# Patient Record
Sex: MALE | Race: WHITE | NOT HISPANIC OR LATINO
[De-identification: names, ages, dates, MRNs, and addresses within clinical notes are randomized per-mention and may not be internally consistent; named-entity substitution may affect disease eponyms.]

---

## 2017-01-30 ENCOUNTER — APPOINTMENT (OUTPATIENT)
Dept: PULMONOLOGY | Facility: CLINIC | Age: 82
End: 2017-01-30

## 2017-02-27 ENCOUNTER — NON-APPOINTMENT (OUTPATIENT)
Age: 82
End: 2017-02-27

## 2017-02-27 ENCOUNTER — LABORATORY RESULT (OUTPATIENT)
Age: 82
End: 2017-02-27

## 2017-02-27 ENCOUNTER — APPOINTMENT (OUTPATIENT)
Dept: CARDIOLOGY | Facility: CLINIC | Age: 82
End: 2017-02-27

## 2017-02-27 VITALS
SYSTOLIC BLOOD PRESSURE: 138 MMHG | HEART RATE: 75 BPM | DIASTOLIC BLOOD PRESSURE: 66 MMHG | BODY MASS INDEX: 20.52 KG/M2 | OXYGEN SATURATION: 99 % | WEIGHT: 131 LBS

## 2017-02-28 LAB
ALBUMIN SERPL ELPH-MCNC: 3.4 G/DL
ALP BLD-CCNC: 79 U/L
ALT SERPL-CCNC: 10 U/L
ANION GAP SERPL CALC-SCNC: 14 MMOL/L
AST SERPL-CCNC: 15 U/L
BASOPHILS # BLD AUTO: 0.05 K/UL
BASOPHILS NFR BLD AUTO: 0.9 %
BILIRUB SERPL-MCNC: 0.4 MG/DL
BUN SERPL-MCNC: 13 MG/DL
CALCIUM SERPL-MCNC: 8.7 MG/DL
CHLORIDE SERPL-SCNC: 106 MMOL/L
CHOLEST SERPL-MCNC: 131 MG/DL
CHOLEST/HDLC SERPL: 3.1 RATIO
CO2 SERPL-SCNC: 21 MMOL/L
CREAT SERPL-MCNC: 1.07 MG/DL
EOSINOPHIL # BLD AUTO: 0.18 K/UL
EOSINOPHIL NFR BLD AUTO: 3.1 %
GLUCOSE SERPL-MCNC: 87 MG/DL
HCT VFR BLD CALC: 34.4 %
HDLC SERPL-MCNC: 42 MG/DL
HGB BLD-MCNC: 10.6 G/DL
IMM GRANULOCYTES NFR BLD AUTO: 0.5 %
LDLC SERPL CALC-MCNC: 60 MG/DL
LYMPHOCYTES # BLD AUTO: 1.16 K/UL
LYMPHOCYTES NFR BLD AUTO: 19.9 %
MAN DIFF?: NORMAL
MCHC RBC-ENTMCNC: 30.5 PG
MCHC RBC-ENTMCNC: 30.8 GM/DL
MCV RBC AUTO: 98.9 FL
MONOCYTES # BLD AUTO: 0.62 K/UL
MONOCYTES NFR BLD AUTO: 10.6 %
NEUTROPHILS # BLD AUTO: 3.8 K/UL
NEUTROPHILS NFR BLD AUTO: 65 %
PLATELET # BLD AUTO: 224 K/UL
POTASSIUM SERPL-SCNC: 4.8 MMOL/L
PROT SERPL-MCNC: 5.8 G/DL
PSA FREE FLD-MCNC: 21.4 %
PSA FREE SERPL-MCNC: 0.35 NG/ML
PSA SERPL-MCNC: 1.63 NG/ML
RBC # BLD: 3.48 M/UL
RBC # FLD: 15.9 %
SODIUM SERPL-SCNC: 141 MMOL/L
TRIGL SERPL-MCNC: 144 MG/DL
TSH SERPL-ACNC: 2.79 UIU/ML
WBC # FLD AUTO: 5.84 K/UL

## 2017-03-05 ENCOUNTER — OUTPATIENT (OUTPATIENT)
Dept: OUTPATIENT SERVICES | Facility: HOSPITAL | Age: 82
LOS: 1 days | End: 2017-03-05
Payer: MEDICARE

## 2017-03-05 ENCOUNTER — APPOINTMENT (OUTPATIENT)
Dept: MRI IMAGING | Facility: CLINIC | Age: 82
End: 2017-03-05

## 2017-03-05 DIAGNOSIS — Z95.1 PRESENCE OF AORTOCORONARY BYPASS GRAFT: Chronic | ICD-10-CM

## 2017-03-05 DIAGNOSIS — Z00.8 ENCOUNTER FOR OTHER GENERAL EXAMINATION: ICD-10-CM

## 2017-03-05 PROCEDURE — 72148 MRI LUMBAR SPINE W/O DYE: CPT

## 2017-03-06 ENCOUNTER — APPOINTMENT (OUTPATIENT)
Dept: INTERNAL MEDICINE | Facility: CLINIC | Age: 82
End: 2017-03-06

## 2017-03-06 VITALS
WEIGHT: 133 LBS | SYSTOLIC BLOOD PRESSURE: 126 MMHG | HEIGHT: 65.25 IN | HEART RATE: 86 BPM | DIASTOLIC BLOOD PRESSURE: 67 MMHG | BODY MASS INDEX: 21.89 KG/M2

## 2017-03-06 DIAGNOSIS — E53.8 DEFICIENCY OF OTHER SPECIFIED B GROUP VITAMINS: ICD-10-CM

## 2017-03-06 DIAGNOSIS — A31.9 MYCOBACTERIAL INFECTION, UNSPECIFIED: ICD-10-CM

## 2017-03-06 RX ORDER — SULFAMETHOXAZOLE AND TRIMETHOPRIM 800; 160 MG/1; MG/1
800-160 TABLET ORAL
Qty: 28 | Refills: 0 | Status: DISCONTINUED | COMMUNITY
Start: 2017-01-19 | End: 2017-03-06

## 2017-03-09 ENCOUNTER — RESULT CHARGE (OUTPATIENT)
Age: 82
End: 2017-03-09

## 2017-03-09 LAB
25(OH)D3 SERPL-MCNC: 19.3 NG/ML
ALBUMIN SERPL ELPH-MCNC: 3.4 G/DL
ALP BLD-CCNC: 72 U/L
ALT SERPL-CCNC: <4 U/L
ANION GAP SERPL CALC-SCNC: 15 MMOL/L
AST SERPL-CCNC: 15 U/L
BASOPHILS # BLD AUTO: 0.05 K/UL
BASOPHILS NFR BLD AUTO: 0.9 %
BILIRUB SERPL-MCNC: 0.2 MG/DL
BUN SERPL-MCNC: 15 MG/DL
CALCIUM SERPL-MCNC: 8.8 MG/DL
CHLORIDE SERPL-SCNC: 108 MMOL/L
CHOLEST SERPL-MCNC: 129 MG/DL
CHOLEST/HDLC SERPL: 3.8 RATIO
CO2 SERPL-SCNC: 19 MMOL/L
CREAT SERPL-MCNC: 1.12 MG/DL
DEPRECATED KAPPA LC FREE/LAMBDA SER: 1.05 RATIO
EOSINOPHIL # BLD AUTO: 0.22 K/UL
EOSINOPHIL NFR BLD AUTO: 4 %
ERYTHROCYTE [SEDIMENTATION RATE] IN BLOOD BY WESTERGREN METHOD: 26 MM/HR
FERRITIN SERPL-MCNC: 63.1 NG/ML
GLUCOSE SERPL-MCNC: 73 MG/DL
HBA1C MFR BLD HPLC: 5 %
HCT VFR BLD CALC: 34.2 %
HCYS SERPL-MCNC: 11.6 UMOL/L
HDLC SERPL-MCNC: 34 MG/DL
HGB BLD-MCNC: 11 G/DL
IF BLOCK AB SER QL: NORMAL
IMM GRANULOCYTES NFR BLD AUTO: 0.7 %
IRON SATN MFR SERPL: 15 %
IRON SERPL-MCNC: 37 UG/DL
KAPPA LC CSF-MCNC: 2.67 MG/DL
KAPPA LC SERPL-MCNC: 2.8 MG/DL
LDLC SERPL CALC-MCNC: 43 MG/DL
LYMPHOCYTES # BLD AUTO: 1.14 K/UL
LYMPHOCYTES NFR BLD AUTO: 20.8 %
M PROTEIN SPEC IFE-MCNC: NORMAL
MAN DIFF?: NORMAL
MCHC RBC-ENTMCNC: 31.6 PG
MCHC RBC-ENTMCNC: 32.2 GM/DL
MCV RBC AUTO: 98.3 FL
METHYLMALONATE SERPL-SCNC: 625 NMOL/L
MONOCYTES # BLD AUTO: 0.53 K/UL
MONOCYTES NFR BLD AUTO: 9.7 %
NEUTROPHILS # BLD AUTO: 3.5 K/UL
NEUTROPHILS NFR BLD AUTO: 63.9 %
PLATELET # BLD AUTO: 214 K/UL
POTASSIUM SERPL-SCNC: 4.1 MMOL/L
PROT SERPL-MCNC: 6 G/DL
RBC # BLD: 3.48 M/UL
RBC # FLD: 16.2 %
SODIUM SERPL-SCNC: 142 MMOL/L
TIBC SERPL-MCNC: 249 UG/DL
TRIGL SERPL-MCNC: 262 MG/DL
TSH SERPL-ACNC: 2.31 UIU/ML
UIBC SERPL-MCNC: 212 UG/DL
WBC # FLD AUTO: 5.48 K/UL

## 2017-03-20 ENCOUNTER — APPOINTMENT (OUTPATIENT)
Dept: ORTHOPEDIC SURGERY | Facility: CLINIC | Age: 82
End: 2017-03-20

## 2017-03-20 VITALS
HEIGHT: 65.25 IN | SYSTOLIC BLOOD PRESSURE: 114 MMHG | WEIGHT: 133 LBS | BODY MASS INDEX: 21.89 KG/M2 | HEART RATE: 80 BPM | DIASTOLIC BLOOD PRESSURE: 60 MMHG

## 2017-04-03 ENCOUNTER — APPOINTMENT (OUTPATIENT)
Dept: INTERNAL MEDICINE | Facility: CLINIC | Age: 82
End: 2017-04-03

## 2017-04-03 ENCOUNTER — LABORATORY RESULT (OUTPATIENT)
Age: 82
End: 2017-04-03

## 2017-04-03 VITALS
WEIGHT: 133 LBS | SYSTOLIC BLOOD PRESSURE: 125 MMHG | BODY MASS INDEX: 21.38 KG/M2 | HEART RATE: 68 BPM | TEMPERATURE: 97.5 F | DIASTOLIC BLOOD PRESSURE: 70 MMHG | HEIGHT: 66 IN

## 2017-04-05 LAB
BASOPHILS # BLD AUTO: 0.07 K/UL
BASOPHILS NFR BLD AUTO: 1.1 %
EOSINOPHIL # BLD AUTO: 0.22 K/UL
EOSINOPHIL NFR BLD AUTO: 3.6 %
FOLATE SERPL-MCNC: >20 NG/ML
HCT VFR BLD CALC: 35.7 %
HGB BLD-MCNC: 10.8 G/DL
IMM GRANULOCYTES NFR BLD AUTO: 0.6 %
LYMPHOCYTES # BLD AUTO: 1.31 K/UL
LYMPHOCYTES NFR BLD AUTO: 21.2 %
MAN DIFF?: NORMAL
MCHC RBC-ENTMCNC: 30.3 GM/DL
MCHC RBC-ENTMCNC: 31.4 PG
MCV RBC AUTO: 103.8 FL
MONOCYTES # BLD AUTO: 0.57 K/UL
MONOCYTES NFR BLD AUTO: 9.2 %
NEUTROPHILS # BLD AUTO: 3.98 K/UL
NEUTROPHILS NFR BLD AUTO: 64.3 %
PLATELET # BLD AUTO: 234 K/UL
RBC # BLD: 3.44 M/UL
RBC # BLD: 3.44 M/UL
RBC # FLD: 16.7 %
RETICS # AUTO: 2.8 %
RETICS AGGREG/RBC NFR: 96.7 K/UL
VIT B12 SERPL-MCNC: 512 PG/ML
WBC # FLD AUTO: 6.19 K/UL

## 2017-04-24 LAB — METHYLMALONATE SERPL-SCNC: 380 NMOL/L

## 2017-05-31 ENCOUNTER — APPOINTMENT (OUTPATIENT)
Dept: INTERNAL MEDICINE | Facility: CLINIC | Age: 82
End: 2017-05-31

## 2017-05-31 VITALS
TEMPERATURE: 97.9 F | SYSTOLIC BLOOD PRESSURE: 146 MMHG | DIASTOLIC BLOOD PRESSURE: 75 MMHG | WEIGHT: 140 LBS | BODY MASS INDEX: 22.5 KG/M2 | HEIGHT: 66 IN | HEART RATE: 63 BPM

## 2017-05-31 DIAGNOSIS — N40.0 BENIGN PROSTATIC HYPERPLASIA WITHOUT LOWER URINARY TRACT SYMPMS: ICD-10-CM

## 2017-05-31 RX ORDER — DUTASTERIDE 0.5 MG/1
CAPSULE, LIQUID FILLED ORAL
Refills: 0 | Status: DISCONTINUED | COMMUNITY
End: 2017-05-31

## 2017-06-01 PROBLEM — N40.0 BENIGN PROSTATE HYPERPLASIA: Status: ACTIVE | Noted: 2017-05-31

## 2017-06-02 ENCOUNTER — TRANSCRIPTION ENCOUNTER (OUTPATIENT)
Age: 82
End: 2017-06-02

## 2017-06-02 LAB
BILIRUB UR QL STRIP: NORMAL
CLARITY UR: CLEAR
COLLECTION METHOD: NORMAL
GLUCOSE UR-MCNC: NORMAL
HCG UR QL: 0.2 EU/DL
HGB UR QL STRIP.AUTO: NORMAL
KETONES UR-MCNC: NORMAL
LEUKOCYTE ESTERASE UR QL STRIP: NORMAL
NITRITE UR QL STRIP: NORMAL
PH UR STRIP: 5.5
PROT UR STRIP-MCNC: NORMAL
SP GR UR STRIP: 1.03

## 2017-06-20 ENCOUNTER — APPOINTMENT (OUTPATIENT)
Dept: DERMATOLOGY | Facility: CLINIC | Age: 82
End: 2017-06-20

## 2017-06-20 VITALS — SYSTOLIC BLOOD PRESSURE: 130 MMHG | DIASTOLIC BLOOD PRESSURE: 80 MMHG

## 2017-06-23 ENCOUNTER — APPOINTMENT (OUTPATIENT)
Dept: INTERNAL MEDICINE | Facility: CLINIC | Age: 82
End: 2017-06-23

## 2017-06-23 VITALS
SYSTOLIC BLOOD PRESSURE: 176 MMHG | HEIGHT: 66 IN | BODY MASS INDEX: 21.21 KG/M2 | DIASTOLIC BLOOD PRESSURE: 90 MMHG | TEMPERATURE: 97.8 F | WEIGHT: 132 LBS

## 2017-07-17 ENCOUNTER — APPOINTMENT (OUTPATIENT)
Dept: PULMONOLOGY | Facility: CLINIC | Age: 82
End: 2017-07-17

## 2017-07-17 VITALS
SYSTOLIC BLOOD PRESSURE: 120 MMHG | TEMPERATURE: 98.6 F | DIASTOLIC BLOOD PRESSURE: 80 MMHG | WEIGHT: 135 LBS | HEART RATE: 72 BPM | BODY MASS INDEX: 21.69 KG/M2 | HEIGHT: 66 IN | RESPIRATION RATE: 16 BRPM | OXYGEN SATURATION: 98 %

## 2017-07-17 DIAGNOSIS — K52.9 NONINFECTIVE GASTROENTERITIS AND COLITIS, UNSPECIFIED: ICD-10-CM

## 2017-07-17 RX ORDER — AMOXICILLIN AND CLAVULANATE POTASSIUM 875; 125 MG/1; MG/1
875-125 TABLET, COATED ORAL
Qty: 14 | Refills: 0 | Status: DISCONTINUED | COMMUNITY
Start: 2017-01-12 | End: 2017-07-17

## 2017-08-07 ENCOUNTER — TRANSCRIPTION ENCOUNTER (OUTPATIENT)
Age: 82
End: 2017-08-07

## 2017-08-07 ENCOUNTER — APPOINTMENT (OUTPATIENT)
Dept: INTERNAL MEDICINE | Facility: CLINIC | Age: 82
End: 2017-08-07
Payer: MEDICARE

## 2017-08-07 ENCOUNTER — LABORATORY RESULT (OUTPATIENT)
Age: 82
End: 2017-08-07

## 2017-08-07 PROCEDURE — 43239 EGD BIOPSY SINGLE/MULTIPLE: CPT

## 2017-08-07 PROCEDURE — 45380 COLONOSCOPY AND BIOPSY: CPT

## 2017-08-10 ENCOUNTER — FORM ENCOUNTER (OUTPATIENT)
Age: 82
End: 2017-08-10

## 2017-08-11 ENCOUNTER — APPOINTMENT (OUTPATIENT)
Dept: CT IMAGING | Facility: IMAGING CENTER | Age: 82
End: 2017-08-11
Payer: MEDICARE

## 2017-08-11 ENCOUNTER — OUTPATIENT (OUTPATIENT)
Dept: OUTPATIENT SERVICES | Facility: HOSPITAL | Age: 82
LOS: 1 days | End: 2017-08-11
Payer: MEDICARE

## 2017-08-11 DIAGNOSIS — D64.9 ANEMIA, UNSPECIFIED: ICD-10-CM

## 2017-08-11 DIAGNOSIS — Z95.1 PRESENCE OF AORTOCORONARY BYPASS GRAFT: Chronic | ICD-10-CM

## 2017-08-11 PROCEDURE — 82565 ASSAY OF CREATININE: CPT

## 2017-08-11 PROCEDURE — 74177 CT ABD & PELVIS W/CONTRAST: CPT | Mod: 26

## 2017-08-28 ENCOUNTER — NON-APPOINTMENT (OUTPATIENT)
Age: 82
End: 2017-08-28

## 2017-08-28 ENCOUNTER — APPOINTMENT (OUTPATIENT)
Dept: CARDIOLOGY | Facility: CLINIC | Age: 82
End: 2017-08-28
Payer: MEDICARE

## 2017-08-28 VITALS
BODY MASS INDEX: 21.86 KG/M2 | OXYGEN SATURATION: 93 % | SYSTOLIC BLOOD PRESSURE: 128 MMHG | TEMPERATURE: 97.7 F | DIASTOLIC BLOOD PRESSURE: 66 MMHG | HEART RATE: 67 BPM | HEIGHT: 66 IN | WEIGHT: 136 LBS

## 2017-08-28 PROCEDURE — 99215 OFFICE O/P EST HI 40 MIN: CPT

## 2017-08-28 PROCEDURE — 93000 ELECTROCARDIOGRAM COMPLETE: CPT

## 2017-10-30 ENCOUNTER — APPOINTMENT (OUTPATIENT)
Dept: DERMATOLOGY | Facility: CLINIC | Age: 82
End: 2017-10-30
Payer: MEDICARE

## 2017-10-30 VITALS — DIASTOLIC BLOOD PRESSURE: 68 MMHG | SYSTOLIC BLOOD PRESSURE: 114 MMHG

## 2017-10-30 DIAGNOSIS — Z78.9 OTHER SPECIFIED HEALTH STATUS: ICD-10-CM

## 2017-10-30 PROCEDURE — 17000 DESTRUCT PREMALG LESION: CPT | Mod: GC

## 2017-10-30 PROCEDURE — 99213 OFFICE O/P EST LOW 20 MIN: CPT | Mod: 25,GC

## 2017-10-30 PROCEDURE — 17003 DESTRUCT PREMALG LES 2-14: CPT | Mod: GC

## 2018-01-12 ENCOUNTER — APPOINTMENT (OUTPATIENT)
Dept: ORTHOPEDIC SURGERY | Facility: CLINIC | Age: 83
End: 2018-01-12
Payer: MEDICARE

## 2018-01-12 VITALS
BODY MASS INDEX: 21.86 KG/M2 | HEART RATE: 72 BPM | WEIGHT: 136 LBS | HEIGHT: 66 IN | DIASTOLIC BLOOD PRESSURE: 68 MMHG | SYSTOLIC BLOOD PRESSURE: 147 MMHG

## 2018-01-12 DIAGNOSIS — M43.16 SPONDYLOLISTHESIS, LUMBAR REGION: ICD-10-CM

## 2018-01-12 PROCEDURE — 99215 OFFICE O/P EST HI 40 MIN: CPT

## 2018-01-16 ENCOUNTER — APPOINTMENT (OUTPATIENT)
Dept: DERMATOLOGY | Facility: CLINIC | Age: 83
End: 2018-01-16

## 2018-01-20 ENCOUNTER — FORM ENCOUNTER (OUTPATIENT)
Age: 83
End: 2018-01-20

## 2018-01-21 ENCOUNTER — OUTPATIENT (OUTPATIENT)
Dept: OUTPATIENT SERVICES | Facility: HOSPITAL | Age: 83
LOS: 1 days | End: 2018-01-21
Payer: MEDICARE

## 2018-01-21 ENCOUNTER — APPOINTMENT (OUTPATIENT)
Dept: MRI IMAGING | Facility: IMAGING CENTER | Age: 83
End: 2018-01-21
Payer: MEDICARE

## 2018-01-21 DIAGNOSIS — M48.07 SPINAL STENOSIS, LUMBOSACRAL REGION: ICD-10-CM

## 2018-01-21 DIAGNOSIS — M43.16 SPONDYLOLISTHESIS, LUMBAR REGION: ICD-10-CM

## 2018-01-21 DIAGNOSIS — Z95.1 PRESENCE OF AORTOCORONARY BYPASS GRAFT: Chronic | ICD-10-CM

## 2018-01-21 DIAGNOSIS — M54.16 RADICULOPATHY, LUMBAR REGION: ICD-10-CM

## 2018-01-21 PROCEDURE — 72148 MRI LUMBAR SPINE W/O DYE: CPT | Mod: 26

## 2018-01-21 PROCEDURE — 72148 MRI LUMBAR SPINE W/O DYE: CPT

## 2018-01-25 ENCOUNTER — RESULT REVIEW (OUTPATIENT)
Age: 83
End: 2018-01-25

## 2018-03-02 ENCOUNTER — APPOINTMENT (OUTPATIENT)
Dept: PULMONOLOGY | Facility: CLINIC | Age: 83
End: 2018-03-02
Payer: MEDICARE

## 2018-03-02 VITALS
BODY MASS INDEX: 20.41 KG/M2 | RESPIRATION RATE: 16 BRPM | TEMPERATURE: 97.9 F | WEIGHT: 127 LBS | HEART RATE: 83 BPM | DIASTOLIC BLOOD PRESSURE: 80 MMHG | HEIGHT: 66 IN | SYSTOLIC BLOOD PRESSURE: 110 MMHG | OXYGEN SATURATION: 95 %

## 2018-03-02 PROCEDURE — 99215 OFFICE O/P EST HI 40 MIN: CPT

## 2018-03-03 ENCOUNTER — APPOINTMENT (OUTPATIENT)
Dept: CT IMAGING | Facility: IMAGING CENTER | Age: 83
End: 2018-03-03

## 2018-03-06 ENCOUNTER — APPOINTMENT (OUTPATIENT)
Dept: CT IMAGING | Facility: IMAGING CENTER | Age: 83
End: 2018-03-06
Payer: MEDICARE

## 2018-03-06 ENCOUNTER — OUTPATIENT (OUTPATIENT)
Dept: OUTPATIENT SERVICES | Facility: HOSPITAL | Age: 83
LOS: 1 days | End: 2018-03-06
Payer: MEDICARE

## 2018-03-06 ENCOUNTER — APPOINTMENT (OUTPATIENT)
Dept: DERMATOLOGY | Facility: CLINIC | Age: 83
End: 2018-03-06

## 2018-03-06 DIAGNOSIS — Z95.1 PRESENCE OF AORTOCORONARY BYPASS GRAFT: Chronic | ICD-10-CM

## 2018-03-06 DIAGNOSIS — J47.1 BRONCHIECTASIS WITH (ACUTE) EXACERBATION: ICD-10-CM

## 2018-03-06 PROCEDURE — 71250 CT THORAX DX C-: CPT | Mod: 26

## 2018-03-06 PROCEDURE — 71250 CT THORAX DX C-: CPT

## 2018-03-08 LAB
BACTERIA SPT CULT: NORMAL
BACTERIA SPT CULT: NORMAL

## 2018-04-05 ENCOUNTER — APPOINTMENT (OUTPATIENT)
Dept: OPHTHALMOLOGY | Facility: CLINIC | Age: 83
End: 2018-04-05
Payer: MEDICARE

## 2018-04-05 DIAGNOSIS — H35.3112 NONEXUDATIVE AGE-RELATED MACULAR DEGENERATION, RIGHT EYE, INTERMEDIATE DRY STAGE: ICD-10-CM

## 2018-04-05 DIAGNOSIS — H35.3121 NONEXUDATIVE AGE-RELATED MACULAR DEGENERATION, LEFT EYE, EARLY DRY STAGE: ICD-10-CM

## 2018-04-05 PROCEDURE — 92015 DETERMINE REFRACTIVE STATE: CPT

## 2018-04-05 PROCEDURE — 92004 COMPRE OPH EXAM NEW PT 1/>: CPT

## 2018-04-05 PROCEDURE — 92225: CPT | Mod: RT

## 2018-04-05 PROCEDURE — 92134 CPTRZ OPH DX IMG PST SGM RTA: CPT

## 2018-04-24 LAB
ACID FAST STN SPT: ABNORMAL
ACID FAST STN SPT: ABNORMAL

## 2018-06-13 ENCOUNTER — EMERGENCY (EMERGENCY)
Facility: HOSPITAL | Age: 83
LOS: 1 days | Discharge: HOME | End: 2018-06-13
Attending: EMERGENCY MEDICINE
Payer: MEDICARE

## 2018-06-13 VITALS
OXYGEN SATURATION: 100 % | TEMPERATURE: 98 F | DIASTOLIC BLOOD PRESSURE: 78 MMHG | SYSTOLIC BLOOD PRESSURE: 128 MMHG | HEART RATE: 82 BPM | RESPIRATION RATE: 16 BRPM

## 2018-06-13 VITALS
SYSTOLIC BLOOD PRESSURE: 112 MMHG | OXYGEN SATURATION: 96 % | HEART RATE: 70 BPM | DIASTOLIC BLOOD PRESSURE: 78 MMHG | RESPIRATION RATE: 18 BRPM

## 2018-06-13 DIAGNOSIS — Z95.1 PRESENCE OF AORTOCORONARY BYPASS GRAFT: Chronic | ICD-10-CM

## 2018-06-13 PROCEDURE — 96375 TX/PRO/DX INJ NEW DRUG ADDON: CPT

## 2018-06-13 PROCEDURE — 99284 EMERGENCY DEPT VISIT MOD MDM: CPT | Mod: 25

## 2018-06-13 PROCEDURE — 99284 EMERGENCY DEPT VISIT MOD MDM: CPT | Mod: GC

## 2018-06-13 PROCEDURE — 96374 THER/PROPH/DIAG INJ IV PUSH: CPT

## 2018-06-13 PROCEDURE — 72131 CT LUMBAR SPINE W/O DYE: CPT | Mod: 26

## 2018-06-13 PROCEDURE — 72131 CT LUMBAR SPINE W/O DYE: CPT

## 2018-06-13 RX ORDER — ACETAMINOPHEN 500 MG
1000 TABLET ORAL ONCE
Qty: 0 | Refills: 0 | Status: COMPLETED | OUTPATIENT
Start: 2018-06-13 | End: 2018-06-13

## 2018-06-13 RX ORDER — KETOROLAC TROMETHAMINE 30 MG/ML
15 SYRINGE (ML) INJECTION ONCE
Qty: 0 | Refills: 0 | Status: DISCONTINUED | OUTPATIENT
Start: 2018-06-13 | End: 2018-06-13

## 2018-06-13 RX ORDER — GABAPENTIN 400 MG/1
100 CAPSULE ORAL ONCE
Qty: 0 | Refills: 0 | Status: COMPLETED | OUTPATIENT
Start: 2018-06-13 | End: 2018-06-13

## 2018-06-13 RX ORDER — LIDOCAINE 4 G/100G
1 CREAM TOPICAL ONCE
Qty: 0 | Refills: 0 | Status: COMPLETED | OUTPATIENT
Start: 2018-06-13 | End: 2018-06-13

## 2018-06-13 RX ADMIN — Medication 400 MILLIGRAM(S): at 07:45

## 2018-06-13 RX ADMIN — GABAPENTIN 100 MILLIGRAM(S): 400 CAPSULE ORAL at 08:53

## 2018-06-13 RX ADMIN — LIDOCAINE 1 PATCH: 4 CREAM TOPICAL at 10:23

## 2018-06-13 RX ADMIN — Medication 1000 MILLIGRAM(S): at 08:53

## 2018-06-13 RX ADMIN — Medication 15 MILLIGRAM(S): at 08:53

## 2018-06-13 RX ADMIN — Medication 15 MILLIGRAM(S): at 09:12

## 2018-06-13 NOTE — ED PROVIDER NOTE - MUSCULOSKELETAL MINIMAL EXAM
Mild tenderness L4-L5 midline and lateral lumbar bilateral, no step offs or deformities, peripheral pulses +2 Mild tenderness L4-L5 midline and lateral lumbar bilateral, no step offs or deformities, peripheral pulses +2, negative straight leg raise and cross leg raise bilaterally

## 2018-06-13 NOTE — ED ADULT NURSE NOTE - OBJECTIVE STATEMENT
87y/o male with history of CAD and CABG, on Plavix and ASA, BIBEMS a&ox3 s/p mechanical fall. Patient reports he had a slip and fall out of bed last night at around 2-3am, falling onto wood floor landing directly on his bottom. Denies LOC or hitting head. Able to get back into bed. States he went back to sleep and woke back up at around 6am. When he tried to get up and walk he felt excruciating pain to lower back, reports feeling diaphoretic and "almost fainted" due to severe pain. Presents to ED requesting and x-ray. States he has no pain when lying still but whenever he moves his pain is 10/10. 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 Denies any other complaints. Awaiting MD mirza.

## 2018-06-13 NOTE — ED ADULT NURSE NOTE - ED STAT RN HAND OFF
Reason for Call: Request for an order or referral:    Order or referral being requested: Mother is requesting referral to podiatrist.    Date needed: as soon as possible    Has the patient been seen by the PCP for this problem? NO    Additional comments:     Phone number Patient can be reached at:  Home number on file [phone] (home)    Best Time:  Any    Can we leave a detailed message on this number?  YES       Call taken on 9/12/2017 at 9:15 AM by Denton Nayak     Handoff

## 2018-06-13 NOTE — ED ADULT NURSE NOTE - PMH
Angina of effort    CAD (coronary artery disease) of bypass graft    CAD S/P percutaneous coronary angioplasty    Essential hypertension, benign    Gastric motility disorder    GERD (gastroesophageal reflux disease)    Hx of benign prostatic hypertrophy    Hyperlipidemia, unspecified hyperlipidemia type    Small bowel obstruction due to adhesions

## 2018-06-13 NOTE — ED ADULT NURSE NOTE - CAS EDN DISCHARGE ASSESSMENT
Pt ambulated independently prior to DC/Symptoms improved/Alert and oriented to person, place and time

## 2018-06-13 NOTE — ED ADULT NURSE REASSESSMENT NOTE - NS ED NURSE REASSESS COMMENT FT1
Received report from night RN Rosa Isela. Patient currently in gold room 7, he is reporting severe pain. Awaiting evaluation by ED attending or resident. Patient repositioned, call bell in hand

## 2018-06-13 NOTE — ED PROVIDER NOTE - PROGRESS NOTE DETAILS
Acetaminophen ordered for pain. CT ordered of lumbar spine. No obvious deformity of spine on CT, awaiting read, pain improved. repeat assessment neuro intact. Ambulating with steady gait, pain much improved. Counseled on using motrin/acetaminophen for pain. Return instructions provided. Expressed understanding. Ambulating with steady gait, pain much improved. Counseled on using motrin/acetaminophen for pain. Return instructions provided. Expressed understanding.    ATTENDING MD Stephanie: improved pain. paresthesias resolved after gabapentin. able to get self out of chair with baseline assist device, walk 10 feet, turn around and seat self (modified get up and go). Do not think inpatient PT required, advised may benefit from outpatient PT. Pt will f/u with spine surgeon. I have advised the patient on the usual course of this condition, home care, an appropriate schedule for follow-up, and concerning signs and symptoms that should prompt return to the emergency department. I answered all questions to the best of my ability. The patient is stable for discharge.

## 2018-06-13 NOTE — ED PROVIDER NOTE - OBJECTIVE STATEMENT
89 yo m with history of CAD, CABG, HTN, GERD, BPH Sciatica presents with lumbar back pain after mechanical fall. Patient reports that he got up in the middle of the night to go to the bathroom, he tripped and fell and was unable to get up. Reports pain in the left calf, numbness in the feet bilaterally. The pain feels typical of his sciatica (pain usually develops in the left lateral calf), however much worse. He has been unable to ambulate since the incident. The patient denies difficulty urinating, saddle anesthesia or weakness. 87 yo m with history of CAD, CABG, HTN, GERD, BPH Sciatica presents with lumbar back pain after mechanical fall. Patient reports that he got up in the middle of the night to go to the bathroom, he tripped and fell and was unable to get up. Reports pain in the left calf, "fuzzy tingling sensation" in the feet bilaterally without juve loss of sensation. The pain feels typical of his sciatica (pain usually develops in the left lateral calf), however much worse. He has been unable to ambulate since the incident. The patient denies difficulty urinating, saddle anesthesia or weakness.

## 2018-06-13 NOTE — ED ADULT NURSE REASSESSMENT NOTE - NS ED NURSE REASSESS COMMENT FT1
Pt reports partial improvement in pain follow ofrimev. Pt reports partial improvement in pain following ofirmev

## 2018-06-13 NOTE — ED PROVIDER NOTE - ATTENDING CONTRIBUTION TO CARE
ATTENDING MD: I, Larry Brown, have personally seen and examined this patient.  I have discussed all aspects of care with the fellow. Fellow note reviewed and agree on plan of care and except where noted.  See HPI, PE, and MDM for details.    Pt with known lumbar sciatica presents s/p slip and fall landing on buttock. was able to get up with pain. reports low lumbar pain in midline and left radiating down left leg. same distribution and quality as known sciatica pain for which he takes tylenol, motrin, and gabapentin. The intensity is more. He has not taken his pain meds. Feels "fuzzy" over feet symmetric bilaterally but not numb. Has been able to walk since with his baseline walker. Denies head strike, neck pain, LOC    VITALS: reviewed and reassuring  GEN: mild distress from pain, otherwise no distress, A & O x 4  HEAD/EYES: NCAT, PERRL, EOMI, anicteric sclerae, no conjunctival pallor  ENT: mucus membranes moist, oropharynx WNL, trachea midline, no JVD  RESP: lungs CTA with equal breath sounds bilaterally, chest wall nontender and atraumatic  CV: heart with reg rhythm S1, S2, no murmur; 2+ DP and PT pulses symmetric bilaterally  ABDOMEN: normoactive bowel sounds, soft, nondistended, nontender, no palpable masses, nonpalpable aorta, no bruits  : no CVAT, normal genetalia, +cremaster bilaterally  MSK: extremities atraumatic and nontender, no edema, no asymmetry. there is no spinal deformity or stepoff. There is diffuse non-point L4-Sacral tenderness without palpable deformity or stepoff, there is mild L-paraspinal tendenress over lumbosacral region and buttock. there is no visible bruising. the neck has no midline tenderness, deformity, or stepoff, and is ranged painlessly. negative straight leg raise and cross leg raise bilaterally  SKIN: warm, dry, no rash, no bruising, no cyanosis. color appropriate for ethnicity  NEURO: alert, mentating appropriately, no facial asymmetry. 5/5 strength in UEs and LEs bilaterally, sensation intact to light touch throughout trunk and extremities. pt reports "fuzziness" of bilateral feet but can feel very light touch with good discrimination. no hyperreflexia.  PSYCH: Affect appropriate     MDM: well appearing male with sipnal tenderness, neuro intact (feet paresthesias but no objective deficits). Will get CT, pain control, reassess.

## 2018-07-23 ENCOUNTER — MESSAGE (OUTPATIENT)
Age: 83
End: 2018-07-23

## 2018-07-23 ENCOUNTER — INPATIENT (INPATIENT)
Facility: HOSPITAL | Age: 83
LOS: 1 days | Discharge: HOME | DRG: 390 | End: 2018-07-25
Attending: SURGERY | Admitting: SURGERY
Payer: MEDICARE

## 2018-07-23 VITALS
SYSTOLIC BLOOD PRESSURE: 172 MMHG | RESPIRATION RATE: 17 BRPM | HEART RATE: 66 BPM | TEMPERATURE: 99 F | DIASTOLIC BLOOD PRESSURE: 74 MMHG | OXYGEN SATURATION: 100 %

## 2018-07-23 DIAGNOSIS — K56.609 UNSPECIFIED INTESTINAL OBSTRUCTION, UNSPECIFIED AS TO PARTIAL VERSUS COMPLETE OBSTRUCTION: ICD-10-CM

## 2018-07-23 DIAGNOSIS — Z95.1 PRESENCE OF AORTOCORONARY BYPASS GRAFT: Chronic | ICD-10-CM

## 2018-07-23 LAB
ALBUMIN SERPL ELPH-MCNC: 3.7 G/DL — SIGNIFICANT CHANGE UP (ref 3.3–5)
ALP SERPL-CCNC: 85 U/L — SIGNIFICANT CHANGE UP (ref 40–120)
ALT FLD-CCNC: 11 U/L — SIGNIFICANT CHANGE UP (ref 10–45)
ANION GAP SERPL CALC-SCNC: 11 MMOL/L — SIGNIFICANT CHANGE UP (ref 5–17)
ANION GAP SERPL CALC-SCNC: 12 MMOL/L — SIGNIFICANT CHANGE UP (ref 5–17)
APTT BLD: 30.2 SEC — SIGNIFICANT CHANGE UP (ref 27.5–37.4)
AST SERPL-CCNC: 16 U/L — SIGNIFICANT CHANGE UP (ref 10–40)
BASE EXCESS BLDV CALC-SCNC: 1.4 MMOL/L — SIGNIFICANT CHANGE UP (ref -2–2)
BASE EXCESS BLDV CALC-SCNC: 2.4 MMOL/L — HIGH (ref -2–2)
BASOPHILS # BLD AUTO: 0 K/UL — SIGNIFICANT CHANGE UP (ref 0–0.2)
BASOPHILS NFR BLD AUTO: 0.6 % — SIGNIFICANT CHANGE UP (ref 0–2)
BILIRUB SERPL-MCNC: 0.5 MG/DL — SIGNIFICANT CHANGE UP (ref 0.2–1.2)
BLD GP AB SCN SERPL QL: NEGATIVE — SIGNIFICANT CHANGE UP
BUN SERPL-MCNC: 14 MG/DL — SIGNIFICANT CHANGE UP (ref 7–23)
BUN SERPL-MCNC: 18 MG/DL — SIGNIFICANT CHANGE UP (ref 7–23)
CA-I SERPL-SCNC: 1.16 MMOL/L — SIGNIFICANT CHANGE UP (ref 1.12–1.3)
CA-I SERPL-SCNC: 1.21 MMOL/L — SIGNIFICANT CHANGE UP (ref 1.12–1.3)
CALCIUM SERPL-MCNC: 8.9 MG/DL — SIGNIFICANT CHANGE UP (ref 8.4–10.5)
CALCIUM SERPL-MCNC: 9.4 MG/DL — SIGNIFICANT CHANGE UP (ref 8.4–10.5)
CHLORIDE BLDV-SCNC: 106 MMOL/L — SIGNIFICANT CHANGE UP (ref 96–108)
CHLORIDE BLDV-SCNC: 108 MMOL/L — SIGNIFICANT CHANGE UP (ref 96–108)
CHLORIDE SERPL-SCNC: 103 MMOL/L — SIGNIFICANT CHANGE UP (ref 96–108)
CHLORIDE SERPL-SCNC: 104 MMOL/L — SIGNIFICANT CHANGE UP (ref 96–108)
CO2 BLDV-SCNC: 28 MMOL/L — SIGNIFICANT CHANGE UP (ref 22–30)
CO2 BLDV-SCNC: 28 MMOL/L — SIGNIFICANT CHANGE UP (ref 22–30)
CO2 SERPL-SCNC: 24 MMOL/L — SIGNIFICANT CHANGE UP (ref 22–31)
CO2 SERPL-SCNC: 25 MMOL/L — SIGNIFICANT CHANGE UP (ref 22–31)
CREAT SERPL-MCNC: 1.25 MG/DL — SIGNIFICANT CHANGE UP (ref 0.5–1.3)
CREAT SERPL-MCNC: 1.4 MG/DL — HIGH (ref 0.5–1.3)
EOSINOPHIL # BLD AUTO: 0.2 K/UL — SIGNIFICANT CHANGE UP (ref 0–0.5)
EOSINOPHIL NFR BLD AUTO: 2.4 % — SIGNIFICANT CHANGE UP (ref 0–6)
GAS PNL BLDV: 135 MMOL/L — LOW (ref 136–145)
GAS PNL BLDV: 136 MMOL/L — SIGNIFICANT CHANGE UP (ref 136–145)
GAS PNL BLDV: SIGNIFICANT CHANGE UP
GLUCOSE BLDV-MCNC: 94 MG/DL — SIGNIFICANT CHANGE UP (ref 70–99)
GLUCOSE BLDV-MCNC: 99 MG/DL — SIGNIFICANT CHANGE UP (ref 70–99)
GLUCOSE SERPL-MCNC: 105 MG/DL — HIGH (ref 70–99)
GLUCOSE SERPL-MCNC: 96 MG/DL — SIGNIFICANT CHANGE UP (ref 70–99)
HCO3 BLDV-SCNC: 26 MMOL/L — SIGNIFICANT CHANGE UP (ref 21–29)
HCO3 BLDV-SCNC: 27 MMOL/L — SIGNIFICANT CHANGE UP (ref 21–29)
HCT VFR BLD CALC: 35.5 % — LOW (ref 39–50)
HCT VFR BLD CALC: 36.2 % — LOW (ref 39–50)
HCT VFR BLDA CALC: 37 % — LOW (ref 39–50)
HCT VFR BLDA CALC: 38 % — LOW (ref 39–50)
HGB BLD CALC-MCNC: 12.2 G/DL — LOW (ref 13–17)
HGB BLD CALC-MCNC: 12.5 G/DL — LOW (ref 13–17)
HGB BLD-MCNC: 12.1 G/DL — LOW (ref 13–17)
HGB BLD-MCNC: 12.1 G/DL — LOW (ref 13–17)
INR BLD: 0.99 RATIO — SIGNIFICANT CHANGE UP (ref 0.88–1.16)
LACTATE BLDV-MCNC: 0.8 MMOL/L — SIGNIFICANT CHANGE UP (ref 0.7–2)
LACTATE BLDV-MCNC: 2.3 MMOL/L — HIGH (ref 0.7–2)
LYMPHOCYTES # BLD AUTO: 1.8 K/UL — SIGNIFICANT CHANGE UP (ref 1–3.3)
LYMPHOCYTES # BLD AUTO: 22.8 % — SIGNIFICANT CHANGE UP (ref 13–44)
MAGNESIUM SERPL-MCNC: 1.8 MG/DL — SIGNIFICANT CHANGE UP (ref 1.6–2.6)
MCHC RBC-ENTMCNC: 30.7 PG — SIGNIFICANT CHANGE UP (ref 27–34)
MCHC RBC-ENTMCNC: 31 PG — SIGNIFICANT CHANGE UP (ref 27–34)
MCHC RBC-ENTMCNC: 33.5 GM/DL — SIGNIFICANT CHANGE UP (ref 32–36)
MCHC RBC-ENTMCNC: 34.2 GM/DL — SIGNIFICANT CHANGE UP (ref 32–36)
MCV RBC AUTO: 90.8 FL — SIGNIFICANT CHANGE UP (ref 80–100)
MCV RBC AUTO: 91.6 FL — SIGNIFICANT CHANGE UP (ref 80–100)
MONOCYTES # BLD AUTO: 0.6 K/UL — SIGNIFICANT CHANGE UP (ref 0–0.9)
MONOCYTES NFR BLD AUTO: 7.3 % — SIGNIFICANT CHANGE UP (ref 2–14)
NEUTROPHILS # BLD AUTO: 5.3 K/UL — SIGNIFICANT CHANGE UP (ref 1.8–7.4)
NEUTROPHILS NFR BLD AUTO: 66.9 % — SIGNIFICANT CHANGE UP (ref 43–77)
OTHER CELLS CSF MANUAL: 3 ML/DL — LOW (ref 18–22)
OTHER CELLS CSF MANUAL: 4 ML/DL — LOW (ref 18–22)
PCO2 BLDV: 41 MMHG — SIGNIFICANT CHANGE UP (ref 35–50)
PCO2 BLDV: 49 MMHG — SIGNIFICANT CHANGE UP (ref 35–50)
PH BLDV: 7.36 — SIGNIFICANT CHANGE UP (ref 7.35–7.45)
PH BLDV: 7.43 — SIGNIFICANT CHANGE UP (ref 7.35–7.45)
PHOSPHATE SERPL-MCNC: 2.8 MG/DL — SIGNIFICANT CHANGE UP (ref 2.5–4.5)
PLATELET # BLD AUTO: 230 K/UL — SIGNIFICANT CHANGE UP (ref 150–400)
PLATELET # BLD AUTO: 238 K/UL — SIGNIFICANT CHANGE UP (ref 150–400)
PO2 BLDV: 16 MMHG — LOW (ref 25–45)
PO2 BLDV: 19 MMHG — LOW (ref 25–45)
POTASSIUM BLDV-SCNC: 4.1 MMOL/L — SIGNIFICANT CHANGE UP (ref 3.5–5.3)
POTASSIUM BLDV-SCNC: 4.7 MMOL/L — SIGNIFICANT CHANGE UP (ref 3.5–5.3)
POTASSIUM SERPL-MCNC: 4.7 MMOL/L — SIGNIFICANT CHANGE UP (ref 3.5–5.3)
POTASSIUM SERPL-MCNC: 5 MMOL/L — SIGNIFICANT CHANGE UP (ref 3.5–5.3)
POTASSIUM SERPL-SCNC: 4.7 MMOL/L — SIGNIFICANT CHANGE UP (ref 3.5–5.3)
POTASSIUM SERPL-SCNC: 5 MMOL/L — SIGNIFICANT CHANGE UP (ref 3.5–5.3)
PROT SERPL-MCNC: 6.4 G/DL — SIGNIFICANT CHANGE UP (ref 6–8.3)
PROTHROM AB SERPL-ACNC: 10.8 SEC — SIGNIFICANT CHANGE UP (ref 9.8–12.7)
RBC # BLD: 3.91 M/UL — LOW (ref 4.2–5.8)
RBC # BLD: 3.95 M/UL — LOW (ref 4.2–5.8)
RBC # FLD: 13.2 % — SIGNIFICANT CHANGE UP (ref 10.3–14.5)
RBC # FLD: 13.3 % — SIGNIFICANT CHANGE UP (ref 10.3–14.5)
RH IG SCN BLD-IMP: POSITIVE — SIGNIFICANT CHANGE UP
SAO2 % BLDV: 18 % — LOW (ref 67–88)
SAO2 % BLDV: 26 % — LOW (ref 67–88)
SODIUM SERPL-SCNC: 139 MMOL/L — SIGNIFICANT CHANGE UP (ref 135–145)
SODIUM SERPL-SCNC: 140 MMOL/L — SIGNIFICANT CHANGE UP (ref 135–145)
WBC # BLD: 6.8 K/UL — SIGNIFICANT CHANGE UP (ref 3.8–10.5)
WBC # BLD: 7.9 K/UL — SIGNIFICANT CHANGE UP (ref 3.8–10.5)
WBC # FLD AUTO: 6.8 K/UL — SIGNIFICANT CHANGE UP (ref 3.8–10.5)
WBC # FLD AUTO: 7.9 K/UL — SIGNIFICANT CHANGE UP (ref 3.8–10.5)

## 2018-07-23 PROCEDURE — 99285 EMERGENCY DEPT VISIT HI MDM: CPT | Mod: 25,GC

## 2018-07-23 PROCEDURE — 93010 ELECTROCARDIOGRAM REPORT: CPT | Mod: 59

## 2018-07-23 PROCEDURE — 71045 X-RAY EXAM CHEST 1 VIEW: CPT | Mod: 26,77

## 2018-07-23 PROCEDURE — 74177 CT ABD & PELVIS W/CONTRAST: CPT | Mod: 26

## 2018-07-23 PROCEDURE — 43753 TX GASTRO INTUB W/ASP: CPT | Mod: GC

## 2018-07-23 PROCEDURE — 71045 X-RAY EXAM CHEST 1 VIEW: CPT | Mod: 26

## 2018-07-23 PROCEDURE — 99222 1ST HOSP IP/OBS MODERATE 55: CPT

## 2018-07-23 RX ORDER — SODIUM CHLORIDE 9 MG/ML
1000 INJECTION INTRAMUSCULAR; INTRAVENOUS; SUBCUTANEOUS ONCE
Qty: 0 | Refills: 0 | Status: COMPLETED | OUTPATIENT
Start: 2018-07-23 | End: 2018-07-23

## 2018-07-23 RX ORDER — SODIUM CHLORIDE 9 MG/ML
1000 INJECTION, SOLUTION INTRAVENOUS
Qty: 0 | Refills: 0 | Status: DISCONTINUED | OUTPATIENT
Start: 2018-07-23 | End: 2018-07-24

## 2018-07-23 RX ORDER — ACETAMINOPHEN 500 MG
1000 TABLET ORAL ONCE
Qty: 0 | Refills: 0 | Status: COMPLETED | OUTPATIENT
Start: 2018-07-23 | End: 2018-07-23

## 2018-07-23 RX ORDER — MORPHINE SULFATE 50 MG/1
4 CAPSULE, EXTENDED RELEASE ORAL ONCE
Qty: 0 | Refills: 0 | Status: DISCONTINUED | OUTPATIENT
Start: 2018-07-23 | End: 2018-07-23

## 2018-07-23 RX ORDER — HEPARIN SODIUM 5000 [USP'U]/ML
5000 INJECTION INTRAVENOUS; SUBCUTANEOUS EVERY 8 HOURS
Qty: 0 | Refills: 0 | Status: DISCONTINUED | OUTPATIENT
Start: 2018-07-23 | End: 2018-07-25

## 2018-07-23 RX ORDER — ONDANSETRON 8 MG/1
4 TABLET, FILM COATED ORAL ONCE
Qty: 0 | Refills: 0 | Status: DISCONTINUED | OUTPATIENT
Start: 2018-07-23 | End: 2018-07-25

## 2018-07-23 RX ORDER — ONDANSETRON 8 MG/1
4 TABLET, FILM COATED ORAL ONCE
Qty: 0 | Refills: 0 | Status: COMPLETED | OUTPATIENT
Start: 2018-07-23 | End: 2018-07-23

## 2018-07-23 RX ORDER — MAGNESIUM SULFATE 500 MG/ML
2 VIAL (ML) INJECTION ONCE
Qty: 0 | Refills: 0 | Status: COMPLETED | OUTPATIENT
Start: 2018-07-23 | End: 2018-07-23

## 2018-07-23 RX ADMIN — Medication 1000 MILLIGRAM(S): at 05:49

## 2018-07-23 RX ADMIN — Medication 1000 MILLIGRAM(S): at 15:50

## 2018-07-23 RX ADMIN — HEPARIN SODIUM 5000 UNIT(S): 5000 INJECTION INTRAVENOUS; SUBCUTANEOUS at 21:26

## 2018-07-23 RX ADMIN — SODIUM CHLORIDE 75 MILLILITER(S): 9 INJECTION, SOLUTION INTRAVENOUS at 21:26

## 2018-07-23 RX ADMIN — ONDANSETRON 4 MILLIGRAM(S): 8 TABLET, FILM COATED ORAL at 00:43

## 2018-07-23 RX ADMIN — Medication 400 MILLIGRAM(S): at 03:24

## 2018-07-23 RX ADMIN — HEPARIN SODIUM 5000 UNIT(S): 5000 INJECTION INTRAVENOUS; SUBCUTANEOUS at 13:46

## 2018-07-23 RX ADMIN — MORPHINE SULFATE 4 MILLIGRAM(S): 50 CAPSULE, EXTENDED RELEASE ORAL at 00:37

## 2018-07-23 RX ADMIN — Medication 50 GRAM(S): at 09:43

## 2018-07-23 RX ADMIN — MORPHINE SULFATE 4 MILLIGRAM(S): 50 CAPSULE, EXTENDED RELEASE ORAL at 01:54

## 2018-07-23 RX ADMIN — Medication 400 MILLIGRAM(S): at 15:20

## 2018-07-23 RX ADMIN — SODIUM CHLORIDE 1000 MILLILITER(S): 9 INJECTION INTRAMUSCULAR; INTRAVENOUS; SUBCUTANEOUS at 00:43

## 2018-07-23 RX ADMIN — SODIUM CHLORIDE 1000 MILLILITER(S): 9 INJECTION INTRAMUSCULAR; INTRAVENOUS; SUBCUTANEOUS at 01:54

## 2018-07-23 RX ADMIN — Medication 62.5 MILLIMOLE(S): at 11:15

## 2018-07-23 NOTE — H&P ADULT - HISTORY OF PRESENT ILLNESS
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 Patient says recently ate a salad and afterwards took imodium (which he periodically takes for chronic diarrhea), which may have precipitated his current symptoms. Reports that he began passing flatus again.     Takes ASA and Plavix for history of a CABG. Previously underwent bilateral inguinal hernia repair with Dr. Linda.   Last colonoscopy was in 2017. 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 Patient says recently ate a salad and afterwards took imodium (which he periodically takes for chronic diarrhea), which may have precipitated his current symptoms. Reports that he began passing flatus again.     Takes ASA and Plavix for history of cardiac stents x 3 and CABG. Previously underwent bilateral inguinal hernia repair with Dr. Linda.   Last colonoscopy was in 2017.

## 2018-07-23 NOTE — ED ADULT NURSE NOTE - OBJECTIVE STATEMENT
88 yr old male BIBEMS c/o abd pain. HX two bowel resections. per pt pain started a few hrs ago. pt endorses two episodes of diarrhea earlier today. last solid BM was Sunday  morning. pt states he feels like he was a small bowel obstruction. upon assessment pt is a&ox3, neuro grossly intact, lungs clear viet, abd distended, tender to palpation in the middle of abdomen. hypoactive bowel sounds in all 4 quadrants, skin WDL. pt states he's nauseous. denies chest pain, sob, fever, or chills.

## 2018-07-23 NOTE — H&P ADULT - NSHPPHYSICALEXAM_GEN_ALL_CORE
Vital Signs Last 24 Hrs  T(C): 37.1 (23 Jul 2018 00:16), Max: 37.1 (23 Jul 2018 00:16)  T(F): 98.7 (23 Jul 2018 00:16), Max: 98.7 (23 Jul 2018 00:16)  HR: 89 (23 Jul 2018 04:03) (66 - 89)  BP: 132/84 (23 Jul 2018 04:03) (132/84 - 172/74)  BP(mean): --  RR: 16 (23 Jul 2018 04:03) (16 - 17)  SpO2: 100% (23 Jul 2018 04:03) (100% - 100%)    General: A&Ox3, NAD.  Neuro: Motor and sensory grossly intact with no focal deficits.  HEENT: Anicteric sclerae.  Respiratory: Unlabored breathing.   CVS: Regular rate and rhythm.  Abdomen: Mildly distended, no focal tenderness. NGT tube in place - readjusted after chest X-ray  Extremities: Warm bilaterally w/ palpable pulses.   MSK: Intact ROM.

## 2018-07-23 NOTE — ED PROVIDER NOTE - PROGRESS NOTE DETAILS
Jeovany Kingsley PGY2: called pcp's on call service - I was told on call doctor is Dr. Muir at [phone]; d/w Dr. Muir, not aware of patient previously no recommendations Jeovany Kingsley PGY2: CT w/ low grade SBO - patient notified, NG tube placed at bedside, surgery paged Jeovany Kingslye PGY2: consulted surg will see patient Jeovany Kingsley PGY2: surg paged Jeovany Kingsley PGY2: called surg, will see Jeovany Kingsley PGY2: surg saw patient - will accept, still pending attending to admit to; recommended additional gentle hydration

## 2018-07-23 NOTE — ED PROVIDER NOTE - ATTENDING CONTRIBUTION TO CARE
87 yo M h/o several abdominal surgeries complicated by SBOs in the past, presents with 1 day of diffuse abdominal pain and distension. rated 8/10 diffuse at this time. + nausea, no vomiting. had normal BM this morning, and two liquid episodes of diarrhea around 2/3pm. pain similar to prior SBOs  no cp, sob, vomiting, urinary complaints.   PE nontoxic. diffuse abdominal distension with nonspecific TTP, non peritoneal    in the Er pt treated with IVfs and pain meds 87 yo M h/o several abdominal surgeries complicated by SBOs in the past, presents with 1 day of diffuse abdominal pain and distension. rated 8/10 diffuse at this time. + nausea, no vomiting. had normal BM this morning, and two liquid episodes of diarrhea around 2/3pm. pain similar to prior SBOs  no cp, sob, vomiting, urinary complaints.   PE nontoxic. diffuse abdominal distension with nonspecific TTP, non peritoneal    in the Er pt treated with IVfs and pain meds. CT confirmed low grade SBO. all incidental findings on imaging d/w pt. NGT placed for SBO and attached to suction. pt tolerated the procedure well. placement confirmed with CXR, NGT advanced after CXR. surgery consulted, pt was admitted to their service in stable condition    Based on patient's HPI as well as the results of today's workup, I felt that patient warranted admission to the hospital for further workup/evaluation and continued management. I discussed the findings of today's workup with the patient and addressed the patient's questions and concerns. The patient was agreeable with admission. I spoke with the hospitalist who accepted the patient for admission and subsequently took over the patient's care.

## 2018-07-23 NOTE — H&P ADULT - ATTENDING COMMENTS
I saw and evaluated patient.  I agree with residents note.    Looks well.  the abdomen is soft and not acutely tender  attempting non operative management of SBO.  NPO, IVF, NGT until bowel function

## 2018-07-23 NOTE — ED PROVIDER NOTE - OBJECTIVE STATEMENT
88M w/ pmh HTN, CAD s/p CABG (asa, plavix), GERD, BPH, sciatica, recurrent SBO p/w abdominal pain since this afternoon - consistent w/ usual SBO, which has had many times in the last few years; last one was several months ago but didn't go to hospital; says last year went to [facility] or [place] for SBO. +nausea, no vomit. Last passed BM 5pm today (Was watery), no flatus since then.     PCP: Montana Juarez  Surgeon: doesn't remember name, unable to find 88M w/ pmh HTN, CAD s/p CABG (asa, plavix), GERD, BPH, sciatica, recurrent SBO p/w abdominal pain since this afternoon - consistent w/ usual SBO, which has had many times in the last few years; last one was several months ago but didn't go to hospital; says last year went to [facility] or [place] for SBO. +nausea, no vomit. Last passed BM 5pm today (Was watery), no flatus since then.     PCP: Montana Juarez  Surgeon: Dr. Linda

## 2018-07-23 NOTE — ED PROVIDER NOTE - MEDICAL DECISION MAKING DETAILS
88M w/ recurrent SBOs p/w abd pain c/w usual SBO w/ nausea - will check ct, labs, give sx relief, reassess

## 2018-07-23 NOTE — H&P ADULT - NSHPOUTPATIENTPROVIDERS_GEN_ALL_CORE
[facility] Linton PMD - Dr. Leonor Linton  Pharmacy: [facility] on [facility]

## 2018-07-23 NOTE — H&P ADULT - ASSESSMENT
Assessment/Plan: 88y Male presents with a SBO, found to have ileal thickening near the previous anastomosis from his SBR.  Hemodynamically stable. Lactate 3.     - NPO with NGT decompression  - IV hydration. Received 3L of fluids in the ED. Recheck lactate.   - VTE ppx    Discussed with Dr. Shi  Pager [phone] Assessment/Plan: 88y Male presents with a SBO, found to have ileal thickening near the previous anastomosis from his SBR.  Hemodynamically stable. Lactate 2.3.     - NPO with NGT decompression  - IV hydration. Received 3L of fluids in the ED. Recheck lactate.   - VTE ppx    Discussed with Dr. Shi  Pager [phone]

## 2018-07-23 NOTE — H&P ADULT - NSHPREVIEWOFSYSTEMS_GEN_ALL_CORE
Constitutional: No fevers, chills, no recent weight loss  ENMT: No changes in hearing, no changes in vision, no sore throat, no cough  Respiratory: No shortness of breath  Cardiovascular: No chest pain, palpitations  Gastrointestinal: See HPI  Genitourinary: No dysuria, frequency, or urgency    Extremities: No joint swelling, no limited range of movement  Neurological: No paresthesia  Skin: No rashes

## 2018-07-23 NOTE — ED PROCEDURE NOTE - CPROC ED GASTRIC INTUB DETAIL1
Gastric tube connected to low continuous suction./The nasogastric tube (see size above) was inserted via the anatomic location./Bowel sounds present to 4 quadrants.

## 2018-07-23 NOTE — H&P ADULT - NSHPLABSRESULTS_GEN_ALL_CORE
CBC Full  -  ( 23 Jul 2018 00:39 )  WBC Count : 7.9 K/uL  Hemoglobin : 12.1 g/dL  Hematocrit : 35.5 %  Platelet Count - Automated : 238 K/uL  Mean Cell Volume : 90.8 fl  Mean Cell Hemoglobin : 31.0 pg  Mean Cell Hemoglobin Concentration : 34.2 gm/dL  Auto Neutrophil # : 5.3 K/uL  Auto Lymphocyte # : 1.8 K/uL  Auto Monocyte # : 0.6 K/uL  Auto Eosinophil # : 0.2 K/uL  Auto Basophil # : 0.0 K/uL  Auto Neutrophil % : 66.9 %  Auto Lymphocyte % : 22.8 %  Auto Monocyte % : 7.3 %  Auto Eosinophil % : 2.4 %  Auto Basophil % : 0.6 %    07-23    139  |  103  |  18  ----------------------------<  105<H>  5.0   |  24  |  1.40<H>    Ca    9.4      23 Jul 2018 00:39    TPro  6.4  /  Alb  3.7  /  TBili  0.5  /  DBili  x   /  AST  16  /  ALT  11  /  AlkPhos  85  07-23    LIVER FUNCTIONS - ( 23 Jul 2018 00:39 )  Alb: 3.7 g/dL / Pro: 6.4 g/dL / ALK PHOS: 85 U/L / ALT: 11 U/L / AST: 16 U/L / GGT: x           PT/INR - ( 23 Jul 2018 01:00 )   PT: 10.8 sec;   INR: 0.99 ratio         PTT - ( 23 Jul 2018 01:00 )  PTT:30.2 sec      RADIOLOGY:  CT Abdomen and Pelvis w/ IV Cont (07.23.18 @ 01:43)    LOWER CHEST: Again noted is a trace loculated right pleural effusion,   decreased from the prior exam. Rounded atelectasis again noted at the   bases.    LIVER: Within normal limits.  GALLBLADDER: Within normal limits.  SPLEEN: Within normal limits.  PANCREAS: Atrophic  ADRENALS: Within normal limits.  KIDNEYS/URETERS: Bilateral renal cysts, largest along the right kidney   measuring up to 7.5 cm. No hydronephrosis    BLADDER: Within normal limits.  REPRODUCTIVE ORGANS: Enlarged prostate    BOWEL: Mildly prominent fluid-filled mid small bowel loops noted.   Compared to 2014 and 2017, there is mild worsening of wall thickening   along the ileum before and after a dilated debris-filled anastomotic   site. This dilated anastomotic pouch measures 7.1 cm in AP dimension,   previously 5.0 cm. There is trace associated inflammatory change.   PERITONEUM: Trace right lower quadrant fluid.  VESSELS:  Mild atherosclerosis  RETROPERITONEUM: No lymphadenopathy.    ABDOMINAL WALL: Ventral scar  BONES: Degenerative changes    IMPRESSION:    Probable low-grade small bowel obstruction secondary to ileal thickening   just proximal to the small bowel anastomosis. This chronic ileal wall   thickening before and after the anastomotic site was seen in 2014 and   2017, however this appears slightly worse on the current examination with   mild associated inflammatory change which may indicate ileitis. In   addition, there is debris filling the anastomotic site which is more   patulous than the prior exam likely related to stasis of bowelcontents. CBC Full  -  ( 23 Jul 2018 00:39 )  WBC Count : 7.9 K/uL  Hemoglobin : 12.1 g/dL  Hematocrit : 35.5 %  Platelet Count - Automated : 238 K/uL  Mean Cell Volume : 90.8 fl  Mean Cell Hemoglobin : 31.0 pg  Mean Cell Hemoglobin Concentration : 34.2 gm/dL  Auto Neutrophil # : 5.3 K/uL  Auto Lymphocyte # : 1.8 K/uL  Auto Monocyte # : 0.6 K/uL  Auto Eosinophil # : 0.2 K/uL  Auto Basophil # : 0.0 K/uL  Auto Neutrophil % : 66.9 %  Auto Lymphocyte % : 22.8 %  Auto Monocyte % : 7.3 %  Auto Eosinophil % : 2.4 %  Auto Basophil % : 0.6 %    07-23    139  |  103  |  18  ----------------------------<  105<H>  5.0   |  24  |  1.40<H>    Ca    9.4      23 Jul 2018 00:39    TPro  6.4  /  Alb  3.7  /  TBili  0.5  /  DBili  x   /  AST  16  /  ALT  11  /  AlkPhos  85  07-23    LIVER FUNCTIONS - ( 23 Jul 2018 00:39 )  Alb: 3.7 g/dL / Pro: 6.4 g/dL / ALK PHOS: 85 U/L / ALT: 11 U/L / AST: 16 U/L / GGT: x           PT/INR - ( 23 Jul 2018 01:00 )   PT: 10.8 sec;   INR: 0.99 ratio         PTT - ( 23 Jul 2018 01:00 )  PTT:30.2 sec      RADIOLOGY:  CT Abdomen and Pelvis w/ IV Cont (07.23.18 @ 01:43)    LOWER CHEST: Again noted is a trace loculated right pleural effusion,   decreased from the prior exam. Rounded atelectasis again noted at the   bases.    LIVER: Within normal limits.  GALLBLADDER: Within normal limits.  SPLEEN: Within normal limits.  PANCREAS: Atrophic  ADRENALS: Within normal limits.  KIDNEYS/URETERS: Bilateral renal cysts, largest along the right kidney   measuring up to 7.5 cm. No hydronephrosis    BLADDER: Within normal limits.  REPRODUCTIVE ORGANS: Enlarged prostate    BOWEL: Mildly prominent fluid-filled mid small bowel loops noted.   Compared to 2014 and 2017, there is mild worsening of wall thickening   along the ileum before and after a dilated debris-filled anastomotic   site. This dilated anastomotic pouch measures 7.1 cm in AP dimension,   previously 5.0 cm. There is trace associated inflammatory change.   PERITONEUM: Trace right lower quadrant fluid.  VESSELS:  Mild atherosclerosis  RETROPERITONEUM: No lymphadenopathy.    ABDOMINAL WALL: Ventral scar  BONES: Degenerative changes    IMPRESSION:    Probable low-grade small bowel obstruction secondary to ileal thickening   just proximal to the small bowel anastomosis. This chronic ileal wall   thickening before and after the anastomotic site was seen in 2014 and   2017, however this appears slightly worse on the current examination with   mild associated inflammatory change which may indicate ileitis. In   addition, there is debris filling the anastomotic site which is more   patulous than the prior exam likely related to stasis of bowel contents.

## 2018-07-23 NOTE — ED PROVIDER NOTE - PHYSICAL EXAMINATION
*GEN:   uncomfortable, in no acute distress, AOx3    ///    *EYES:   pupils equally round and reactive to light, extra-occular movements intact    ///    *HEENT:   airway patent, moist mucosal membranes    ///    *CV:   regular rate and rhythm    ///    *RESP:   clear to auscultation bilaterally, non-labored    ///    *ABD:   softly distended; tender over epigastrum    ///    *:   no cva/flank tenderness    ///    *MSK:   no MSK tenderness or limited ROM    ///    *SKIN:   dry, intact    ///    *NEURO:   AOx3, no focal weakness or loss of sensation

## 2018-07-23 NOTE — ED ADULT NURSE NOTE - CHPI ED SYMPTOMS NEG
no fever/no nausea/no blood in stool/no burning urination/no hematuria/no vomiting/no dysuria/no chills

## 2018-07-24 ENCOUNTER — TRANSCRIPTION ENCOUNTER (OUTPATIENT)
Age: 83
End: 2018-07-24

## 2018-07-24 LAB
ANION GAP SERPL CALC-SCNC: 11 MMOL/L — SIGNIFICANT CHANGE UP (ref 5–17)
BUN SERPL-MCNC: 11 MG/DL — SIGNIFICANT CHANGE UP (ref 7–23)
CALCIUM SERPL-MCNC: 8.3 MG/DL — LOW (ref 8.4–10.5)
CHLORIDE SERPL-SCNC: 104 MMOL/L — SIGNIFICANT CHANGE UP (ref 96–108)
CO2 SERPL-SCNC: 26 MMOL/L — SIGNIFICANT CHANGE UP (ref 22–31)
CREAT SERPL-MCNC: 1.31 MG/DL — HIGH (ref 0.5–1.3)
GLUCOSE SERPL-MCNC: 88 MG/DL — SIGNIFICANT CHANGE UP (ref 70–99)
HCT VFR BLD CALC: 33.3 % — LOW (ref 39–50)
HGB BLD-MCNC: 11.1 G/DL — LOW (ref 13–17)
MAGNESIUM SERPL-MCNC: 2.3 MG/DL — SIGNIFICANT CHANGE UP (ref 1.6–2.6)
MCHC RBC-ENTMCNC: 30.7 PG — SIGNIFICANT CHANGE UP (ref 27–34)
MCHC RBC-ENTMCNC: 33.4 GM/DL — SIGNIFICANT CHANGE UP (ref 32–36)
MCV RBC AUTO: 91.8 FL — SIGNIFICANT CHANGE UP (ref 80–100)
PHOSPHATE SERPL-MCNC: 3.2 MG/DL — SIGNIFICANT CHANGE UP (ref 2.5–4.5)
PLATELET # BLD AUTO: 195 K/UL — SIGNIFICANT CHANGE UP (ref 150–400)
POTASSIUM SERPL-MCNC: 4.3 MMOL/L — SIGNIFICANT CHANGE UP (ref 3.5–5.3)
POTASSIUM SERPL-SCNC: 4.3 MMOL/L — SIGNIFICANT CHANGE UP (ref 3.5–5.3)
RBC # BLD: 3.63 M/UL — LOW (ref 4.2–5.8)
RBC # FLD: 13.5 % — SIGNIFICANT CHANGE UP (ref 10.3–14.5)
SODIUM SERPL-SCNC: 141 MMOL/L — SIGNIFICANT CHANGE UP (ref 135–145)
WBC # BLD: 5.6 K/UL — SIGNIFICANT CHANGE UP (ref 3.8–10.5)
WBC # FLD AUTO: 5.6 K/UL — SIGNIFICANT CHANGE UP (ref 3.8–10.5)

## 2018-07-24 RX ORDER — FINASTERIDE 5 MG/1
5 TABLET, FILM COATED ORAL DAILY
Qty: 0 | Refills: 0 | Status: DISCONTINUED | OUTPATIENT
Start: 2018-07-24 | End: 2018-07-25

## 2018-07-24 RX ORDER — ACETAMINOPHEN 500 MG
1000 TABLET ORAL ONCE
Qty: 0 | Refills: 0 | Status: COMPLETED | OUTPATIENT
Start: 2018-07-24 | End: 2018-07-24

## 2018-07-24 RX ORDER — RANOLAZINE 500 MG/1
500 TABLET, FILM COATED, EXTENDED RELEASE ORAL
Qty: 0 | Refills: 0 | Status: DISCONTINUED | OUTPATIENT
Start: 2018-07-24 | End: 2018-07-25

## 2018-07-24 RX ORDER — ACETAMINOPHEN 500 MG
650 TABLET ORAL EVERY 6 HOURS
Qty: 0 | Refills: 0 | Status: DISCONTINUED | OUTPATIENT
Start: 2018-07-24 | End: 2018-07-25

## 2018-07-24 RX ORDER — ACETAMINOPHEN 500 MG
2 TABLET ORAL
Qty: 0 | Refills: 0 | COMMUNITY
Start: 2018-07-24

## 2018-07-24 RX ORDER — TAMSULOSIN HYDROCHLORIDE 0.4 MG/1
0.4 CAPSULE ORAL AT BEDTIME
Qty: 0 | Refills: 0 | Status: DISCONTINUED | OUTPATIENT
Start: 2018-07-24 | End: 2018-07-25

## 2018-07-24 RX ORDER — PANTOPRAZOLE SODIUM 20 MG/1
40 TABLET, DELAYED RELEASE ORAL
Qty: 0 | Refills: 0 | Status: DISCONTINUED | OUTPATIENT
Start: 2018-07-24 | End: 2018-07-25

## 2018-07-24 RX ORDER — CLOPIDOGREL BISULFATE 75 MG/1
75 TABLET, FILM COATED ORAL DAILY
Qty: 0 | Refills: 0 | Status: DISCONTINUED | OUTPATIENT
Start: 2018-07-24 | End: 2018-07-25

## 2018-07-24 RX ORDER — BUDESONIDE AND FORMOTEROL FUMARATE DIHYDRATE 160; 4.5 UG/1; UG/1
2 AEROSOL RESPIRATORY (INHALATION)
Qty: 0 | Refills: 0 | Status: DISCONTINUED | OUTPATIENT
Start: 2018-07-24 | End: 2018-07-25

## 2018-07-24 RX ORDER — BUDESONIDE AND FORMOTEROL FUMARATE DIHYDRATE 160; 4.5 UG/1; UG/1
2 AEROSOL RESPIRATORY (INHALATION)
Qty: 0 | Refills: 0 | Status: DISCONTINUED | OUTPATIENT
Start: 2018-07-24 | End: 2018-07-24

## 2018-07-24 RX ORDER — ASPIRIN/CALCIUM CARB/MAGNESIUM 324 MG
81 TABLET ORAL DAILY
Qty: 0 | Refills: 0 | Status: DISCONTINUED | OUTPATIENT
Start: 2018-07-24 | End: 2018-07-25

## 2018-07-24 RX ADMIN — HEPARIN SODIUM 5000 UNIT(S): 5000 INJECTION INTRAVENOUS; SUBCUTANEOUS at 05:11

## 2018-07-24 RX ADMIN — Medication 1000 MILLIGRAM(S): at 09:34

## 2018-07-24 RX ADMIN — HEPARIN SODIUM 5000 UNIT(S): 5000 INJECTION INTRAVENOUS; SUBCUTANEOUS at 12:19

## 2018-07-24 RX ADMIN — RANOLAZINE 500 MILLIGRAM(S): 500 TABLET, FILM COATED, EXTENDED RELEASE ORAL at 18:00

## 2018-07-24 RX ADMIN — Medication 650 MILLIGRAM(S): at 21:44

## 2018-07-24 RX ADMIN — HEPARIN SODIUM 5000 UNIT(S): 5000 INJECTION INTRAVENOUS; SUBCUTANEOUS at 21:43

## 2018-07-24 RX ADMIN — Medication 1 DROP(S): at 12:20

## 2018-07-24 RX ADMIN — Medication 650 MILLIGRAM(S): at 22:14

## 2018-07-24 RX ADMIN — Medication 1 DROP(S): at 18:00

## 2018-07-24 RX ADMIN — Medication 400 MILLIGRAM(S): at 09:04

## 2018-07-24 RX ADMIN — TAMSULOSIN HYDROCHLORIDE 0.4 MILLIGRAM(S): 0.4 CAPSULE ORAL at 21:44

## 2018-07-24 NOTE — DISCHARGE NOTE ADULT - CARE PLAN
Principal Discharge DX:	SBO (small bowel obstruction)  Goal:	resolved  Assessment and plan of treatment:	ACTIVITY: No heavy lifting anything more than 10-15lbs or straining. Otherwise, you may return to your usual level of physical activity. If you are taking narcotic pain medication (such as Percocet), do NOT drive a car, operate machinery or make important decisions.  DIET: Regular diet  NOTIFY YOUR SURGEON IF: You have any fever (over 100.4 F) or chills, persistent nausea/vomiting with inability to tolerate food or liquids, persistent diarrhea, or if your pain is not controlled on your discharge pain medications.  FOLLOW-UP:  1. Please call to make a follow-up appointment within one week of discharge.   2. Please follow up with your primary care physician in one week regarding your hospitalization.  Goal:	incidental finding on chest xray  Assessment and plan of treatment:	Finding: Multiple bilateral nodular opacities, right greater than left, slightly increased since 8/26/2016.  Recommend noncontrast chest CT. Follow up with PMD regarding incidental finding. Principal Discharge DX:	SBO (small bowel obstruction)  Goal:	resolved  Assessment and plan of treatment:	ACTIVITY: No heavy lifting anything more than 10-15lbs or straining. Otherwise, you may return to your usual level of physical activity. If you are taking narcotic pain medication (such as Percocet), do NOT drive a car, operate machinery or make important decisions.  DIET: Regular diet  NOTIFY YOUR SURGEON IF: You have any fever (over 100.4 F) or chills, persistent nausea/vomiting with inability to tolerate food or liquids, persistent diarrhea, or if your pain is not controlled on your discharge pain medications.  FOLLOW-UP:  1. Please call to make a follow-up appointment within one week of discharge.   2. Please follow up with your primary care physician in one week regarding your hospitalization.  Goal:	incidental finding on chest xray  Assessment and plan of treatment:	Finding: Multiple bilateral nodular opacities, right greater than left, slightly increased since 8/26/2016.  Recommend noncontrast chest CT. Follow up with regular medical doctor Dr. Javier Tirado regarding incidental finding.

## 2018-07-24 NOTE — DISCHARGE NOTE ADULT - CONDITIONS AT DISCHARGE
Pt a/ox4, VSS. Ambulating independently w standby assist, tolerating diet and voiding w/o difficulty. Skin intact.  all medications given as ordered. Hourly rounding done, safety maintained. Incentive spirometer encouraged 10x,hr. IV site assessed and remained WNL throughout shift. dc instructions provided to pt and family at bedside, ready to d/c home.

## 2018-07-24 NOTE — DISCHARGE NOTE ADULT - NS AS ACTIVITY OBS
Showering allowed/while taking pain medications/Do not make important decisions/Do not drive or operate machinery/No Heavy lifting/straining

## 2018-07-24 NOTE — DISCHARGE NOTE ADULT - OTHER SIGNIFICANT FINDINGS
< from: CT Abdomen and Pelvis w/ IV Cont (07.23.18 @ 01:43) >  EXAM:  CT ABDOMEN AND PELVIS IC                            PROCEDURE DATE:  07/23/2018            INTERPRETATION:  CLINICAL INFORMATION: Abdominal pain, history of small   bowel obstruction    COMPARISON: CT 8/5/2014 and CT 8/11/17    PROCEDURE:   CT of the Abdomen and Pelvis was performed with intravenous contrast.   Intravenous contrast: 90 ml Omnipaque 350. 10 ml discarded.  Oral contrast: None.  Sagittal and coronal reformats were performed.    FINDINGS:    LOWER CHEST: Again noted is a trace loculated right pleural effusion,   decreased from the prior exam. Rounded atelectasis again noted at the   bases.    LIVER: Within normal limits.  GALLBLADDER: Within normal limits.  SPLEEN: Within normal limits.  PANCREAS: Atrophic  ADRENALS: Within normal limits.  KIDNEYS/URETERS: Bilateral renal cysts, largest along the right kidney   measuring up to 7.5 cm. No hydronephrosis    BLADDER: Within normal limits.  REPRODUCTIVE ORGANS: Enlarged prostate    BOWEL: Mildly prominent fluid-filled mid small bowel loops noted.   Compared to 2014 and 2017, there is mild worsening of wall thickening   along the ileum before and after a dilated debris-filled anastomotic   site. This dilated anastomotic pouch measures 7.1 cm in AP dimension,   previously 5.0 cm. There is trace associated inflammatory change.   PERITONEUM: Trace right lower quadrant fluid.  VESSELS:  Mild atherosclerosis  RETROPERITONEUM: No lymphadenopathy.    ABDOMINAL WALL: Ventral scar  BONES: Degenerative changes    IMPRESSION:    Probable low-grade small bowel obstruction secondary to ileal thickening   just proximal to the small bowel anastomosis. This chronic ileal wall   thickening before and after the anastomotic site was seen in 2014 and   2017, however this appears slightly worse on the current examination with   mild associated inflammatory change which may indicate ileitis. In   addition, there is debris filling the anastomotic site which is more   patulous than the prior exam likely related to stasis of bowelcontents.        < end of copied text >      < from: Xray Chest 1 View AP/PA (07.23.18 @ 03:14) >  EXAM:  XR CHEST AP OR PA 1V                            PROCEDURE DATE:  07/23/2018            INTERPRETATION:  CLINICAL INDICATION: Status post enteric tube placement.    EXAM: Frontal view of the chest with comparison made to chest radiograph   on8/26/2016 and chest CT 3/6/2018    FINDINGS:   Enteric tube with distal tip in the stomach. Status post median   sternotomy and CABG.  Multiple bilateral nodular opacities, right greater than left, slightly   increased since 8/26/2016. Trace right pleural effusion. There is no   pneumothorax.  The heart size is slightly enlarged.     IMPRESSION:   Multiple bilateral nodular opacities, right greater than left, slightly   increased since 8/26/2016. Trace right pleural effusion. Recommend   noncontrast chest CT.    < end of copied text >

## 2018-07-24 NOTE — DISCHARGE NOTE ADULT - CARE PROVIDER_API CALL
Antonella Santos), Surgery; Surgical Critical Care  [address] [facility]  [address] [place] [address]  Phone: [phone]  Fax: [phone] Antonella Santos), Surgery; Surgical Critical Care  [address] [facility]  [address] [place] [address]  Phone: [phone]  Fax: [phone] Antonella Santos), Surgery; Surgical Critical Care  [address] [facility]  [address] [place] [address]  Phone: [phone]  Fax: [phone]

## 2018-07-24 NOTE — PROGRESS NOTE ADULT - SUBJECTIVE AND OBJECTIVE BOX
Trauma Team Surgery Progress Note     Subjective/24hour Events: No acute events overnight. NGT removed this AM and patient started on CLD    Vital Signs:  Vital Signs Last 24 Hrs  T(C): 37 (24 Jul 2018 09:03), Max: 37.6 (24 Jul 2018 04:51)  T(F): 98.6 (24 Jul 2018 09:03), Max: 99.6 (24 Jul 2018 04:51)  HR: 64 (24 Jul 2018 09:03) (62 - 78)  BP: 127/64 (24 Jul 2018 09:03) (126/66 - 164/76)  BP(mean): --  RR: 18 (24 Jul 2018 09:03) (16 - 18)  SpO2: 97% (24 Jul 2018 09:03) (95% - 99%)    CAPILLARY BLOOD GLUCOSE          I&O's Detail    23 Jul 2018 07:01  -  24 Jul 2018 07:00  --------------------------------------------------------  IN:    lactated ringers.: 900 mL  Total IN: 900 mL    OUT:    Nasoenteral Tube: 75 mL  Total OUT: 75 mL    Total NET: 825 mL            MEDICATIONS  (STANDING):  acetaminophen   Tablet. 650 milliGRAM(s) Oral every 6 hours  artificial tears (preservative free) Ophthalmic Solution 1 Drop(s) Both EYES four times a day  heparin  Injectable 5000 Unit(s) SubCutaneous every 8 hours  lactated ringers. 1000 milliLiter(s) (75 mL/Hr) IV Continuous <Continuous>  ondansetron Injectable 4 milliGRAM(s) IV Push once    MEDICATIONS  (PRN):        Physical Exam:  Gen: NAD  Resp: nonlabored  Card: pulse regularly present, wwp  GI: soft, nt, nd      Labs:    07-24    141  |  104  |  11  ----------------------------<  88  4.3   |  26  |  1.31<H>    Ca    8.3<L>      24 Jul 2018 07:05  Phos  3.2     07-24  Mg     2.3     07-24    TPro  6.4  /  Alb  3.7  /  TBili  0.5  /  DBili  x   /  AST  16  /  ALT  11  /  AlkPhos  85  07-23    LIVER FUNCTIONS - ( 23 Jul 2018 00:39 )  Alb: 3.7 g/dL / Pro: 6.4 g/dL / ALK PHOS: 85 U/L / ALT: 11 U/L / AST: 16 U/L / GGT: x                                 11.1   5.6   )-----------( 195      ( 24 Jul 2018 07:05 )             33.3     PT/INR - ( 23 Jul 2018 01:00 )   PT: 10.8 sec;   INR: 0.99 ratio         PTT - ( 23 Jul 2018 01:00 )  PTT:30.2 sec          Imaging:

## 2018-07-24 NOTE — DISCHARGE NOTE ADULT - ADDITIONAL INSTRUCTIONS
Follow-up with outpatient primary care doctor, Dr. Javier Juarez, for creatinine 1.48. Follow-up with outpatient primary care doctor, Dr. Javier Juarez, for creatinine 1.48.  Follow-up outpatient cardiology for echo.

## 2018-07-24 NOTE — PROGRESS NOTE ADULT - ASSESSMENT
88y Male presents with a SBO, found to have ileal thickening near the previous anastomosis from his SBR.  Hemodynamically stable. NGT removed, can advance diet and monitor tolerance.     - CLD, advance as tolerated   - monitor GI function  - OOB as tolerated  - DVT ppx  - saline eye drops  - PO tylenol   - will discuss with attending

## 2018-07-24 NOTE — DISCHARGE NOTE ADULT - PATIENT PORTAL LINK FT
You can access the [facility] Patient Portal, offered by [facility], by registering with the following website: [email or web address]

## 2018-07-24 NOTE — DISCHARGE NOTE ADULT - HOSPITAL COURSE
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 Patient says recently ate a salad and afterwards took imodium (which he periodically takes for chronic diarrhea), which may have precipitated his current symptoms. Reports that he began passing flatus again.     Takes ASA and Plavix for history of cardiac stents x 3 and CABG. Previously underwent bilateral inguinal hernia repair with Dr. Linda.   Last colonoscopy was in 2017.    CT A/P showed probable low grade SBO.  Pt admitted to [facility] surgery. NGT placed, NPO, ivf, serial abdominal exams. Patient states his pain and distension improved after NGT placement.  Patient NGT output d/c HD2 2/2 low output.  Patient adv to CLD then regular food which he tolerated.    At the time of discharge, the patient was hemodynamically stable, was tolerating PO diet, was voiding urine and passing stool, was ambulating, and was comfortable with adequate pain control. The patient was instructed to follow up with Dr. Santos and PMD within 1-2 weeks after discharge from the hospital. The patient felt comfortable with discharge. The patient was discharged to home. The patient had no other issues. 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 Patient says recently ate a salad and afterwards took imodium (which he periodically takes for chronic diarrhea), which may have precipitated his current symptoms. Reports that he began passing flatus again.     Takes ASA and Plavix for history of cardiac stents x 3 and CABG. Previously underwent bilateral inguinal hernia repair with Dr. Linda.   Last colonoscopy was in 2017.    CT A/P showed probable low grade SBO.  Pt admitted to ACS surgery. NGT placed, NPO, ivf, serial abdominal exams. Patient states his pain and distension improved after NGT placement.  Patient NGT output d/c HD2 2/2 low output.  Patient adv to CLD then regular food which he tolerated.     At the time of discharge, the patient was hemodynamically stable, was tolerating PO diet, was voiding urine and passing stool, was ambulating, and was comfortable with adequate pain control. The patient was instructed to follow up with Dr. Santos and PMD within 1-2 weeks after discharge from the hospital. The patient felt comfortable with discharge. The patient was discharged to home. The patient had no other issues.

## 2018-07-24 NOTE — DISCHARGE NOTE ADULT - PLAN OF CARE
resolved ACTIVITY: No heavy lifting anything more than 10-15lbs or straining. Otherwise, you may return to your usual level of physical activity. If you are taking narcotic pain medication (such as Percocet), do NOT drive a car, operate machinery or make important decisions.  DIET: Regular diet  NOTIFY YOUR SURGEON IF: You have any fever (over 100.4 F) or chills, persistent nausea/vomiting with inability to tolerate food or liquids, persistent diarrhea, or if your pain is not controlled on your discharge pain medications.  FOLLOW-UP:  1. Please call to make a follow-up appointment within one week of discharge.   2. Please follow up with your primary care physician in one week regarding your hospitalization. incidental finding on chest xray Finding: Multiple bilateral nodular opacities, right greater than left, slightly increased since 8/26/2016.  Recommend noncontrast chest CT. Follow up with PMD regarding incidental finding. Finding: Multiple bilateral nodular opacities, right greater than left, slightly increased since 8/26/2016.  Recommend noncontrast chest CT. Follow up with regular medical doctor Dr. Javier Tirado regarding incidental finding.

## 2018-07-24 NOTE — DISCHARGE NOTE ADULT - MEDICATION SUMMARY - MEDICATIONS TO TAKE
I will START or STAY ON the medications listed below when I get home from the hospital:    Avodart 0.5 mg oral capsule  -- 1 cap(s) by mouth once a day  -- Indication: For For urinary health    Aspirin Enteric Coated 81 mg oral delayed release tablet  -- 1 tab(s) by mouth once a day  -- Indication: For For heart health    acetaminophen 325 mg oral tablet  -- 2 tab(s) by mouth every 6 hours  -- Indication: For For pain    Flomax 0.4 mg oral capsule  -- 1 cap(s) by mouth once a day  -- Indication: For for urinary health    Ranexa 500 mg oral tablet, extended release  -- 1 tab(s) by mouth 2 times a day  -- Indication: For For heart health    clopidogrel 75 mg oral tablet  -- 1 tab(s) by mouth once a day  -- Indication: For For heart health    Advair Diskus 250 mcg-50 mcg inhalation powder  -- 1 puff(s) inhaled 2 times a day  -- Indication: For For asthma    ocular lubricant ophthalmic solution  -- 1 drop(s) to each affected eye 4 times a day  -- Indication: For For dry eyes    Protonix 40 mg oral delayed release tablet  -- 1 tab(s) by mouth once a day  -- Indication: For For acid reflux    fluticasone 50 mcg inhalation powder  -- 1 puff(s) inhaled 2 times a day, As Needed  -- Indication: For For asthma    multivitamin  -- 1 tab(s) by mouth once a day  -- Indication: For Home dietary supplement    Vitamin D3 2000 intl units oral capsule  -- 1 cap(s) by mouth once a day  -- Indication: For Home dietary supplement

## 2018-07-25 VITALS
OXYGEN SATURATION: 98 % | SYSTOLIC BLOOD PRESSURE: 131 MMHG | DIASTOLIC BLOOD PRESSURE: 72 MMHG | HEART RATE: 61 BPM | TEMPERATURE: 98 F | RESPIRATION RATE: 18 BRPM

## 2018-07-25 LAB
ANION GAP SERPL CALC-SCNC: 9 MMOL/L — SIGNIFICANT CHANGE UP (ref 5–17)
BUN SERPL-MCNC: 14 MG/DL — SIGNIFICANT CHANGE UP (ref 7–23)
CALCIUM SERPL-MCNC: 8.4 MG/DL — SIGNIFICANT CHANGE UP (ref 8.4–10.5)
CHLORIDE SERPL-SCNC: 107 MMOL/L — SIGNIFICANT CHANGE UP (ref 96–108)
CO2 SERPL-SCNC: 25 MMOL/L — SIGNIFICANT CHANGE UP (ref 22–31)
CREAT SERPL-MCNC: 1.48 MG/DL — HIGH (ref 0.5–1.3)
GLUCOSE SERPL-MCNC: 87 MG/DL — SIGNIFICANT CHANGE UP (ref 70–99)
HCT VFR BLD CALC: 34 % — LOW (ref 39–50)
HGB BLD-MCNC: 11 G/DL — LOW (ref 13–17)
MAGNESIUM SERPL-MCNC: 2.2 MG/DL — SIGNIFICANT CHANGE UP (ref 1.6–2.6)
MCHC RBC-ENTMCNC: 29.7 PG — SIGNIFICANT CHANGE UP (ref 27–34)
MCHC RBC-ENTMCNC: 32.3 GM/DL — SIGNIFICANT CHANGE UP (ref 32–36)
MCV RBC AUTO: 91.8 FL — SIGNIFICANT CHANGE UP (ref 80–100)
PHOSPHATE SERPL-MCNC: 3.2 MG/DL — SIGNIFICANT CHANGE UP (ref 2.5–4.5)
PLATELET # BLD AUTO: 192 K/UL — SIGNIFICANT CHANGE UP (ref 150–400)
POTASSIUM SERPL-MCNC: 4.4 MMOL/L — SIGNIFICANT CHANGE UP (ref 3.5–5.3)
POTASSIUM SERPL-SCNC: 4.4 MMOL/L — SIGNIFICANT CHANGE UP (ref 3.5–5.3)
RBC # BLD: 3.7 M/UL — LOW (ref 4.2–5.8)
RBC # FLD: 13.2 % — SIGNIFICANT CHANGE UP (ref 10.3–14.5)
SODIUM SERPL-SCNC: 141 MMOL/L — SIGNIFICANT CHANGE UP (ref 135–145)
WBC # BLD: 4.2 K/UL — SIGNIFICANT CHANGE UP (ref 3.8–10.5)
WBC # FLD AUTO: 4.2 K/UL — SIGNIFICANT CHANGE UP (ref 3.8–10.5)

## 2018-07-25 PROCEDURE — 82947 ASSAY GLUCOSE BLOOD QUANT: CPT

## 2018-07-25 PROCEDURE — 82435 ASSAY OF BLOOD CHLORIDE: CPT

## 2018-07-25 PROCEDURE — 96374 THER/PROPH/DIAG INJ IV PUSH: CPT | Mod: XU

## 2018-07-25 PROCEDURE — 86901 BLOOD TYPING SEROLOGIC RH(D): CPT

## 2018-07-25 PROCEDURE — 86900 BLOOD TYPING SEROLOGIC ABO: CPT

## 2018-07-25 PROCEDURE — 82803 BLOOD GASES ANY COMBINATION: CPT

## 2018-07-25 PROCEDURE — 83605 ASSAY OF LACTIC ACID: CPT

## 2018-07-25 PROCEDURE — 80048 BASIC METABOLIC PNL TOTAL CA: CPT

## 2018-07-25 PROCEDURE — 83690 ASSAY OF LIPASE: CPT

## 2018-07-25 PROCEDURE — 43753 TX GASTRO INTUB W/ASP: CPT

## 2018-07-25 PROCEDURE — 84295 ASSAY OF SERUM SODIUM: CPT

## 2018-07-25 PROCEDURE — 84100 ASSAY OF PHOSPHORUS: CPT

## 2018-07-25 PROCEDURE — 83735 ASSAY OF MAGNESIUM: CPT

## 2018-07-25 PROCEDURE — 71045 X-RAY EXAM CHEST 1 VIEW: CPT

## 2018-07-25 PROCEDURE — 80053 COMPREHEN METABOLIC PANEL: CPT

## 2018-07-25 PROCEDURE — 85027 COMPLETE CBC AUTOMATED: CPT

## 2018-07-25 PROCEDURE — 99285 EMERGENCY DEPT VISIT HI MDM: CPT | Mod: 25

## 2018-07-25 PROCEDURE — 93005 ELECTROCARDIOGRAM TRACING: CPT | Mod: XU

## 2018-07-25 PROCEDURE — 82330 ASSAY OF CALCIUM: CPT

## 2018-07-25 PROCEDURE — 85014 HEMATOCRIT: CPT

## 2018-07-25 PROCEDURE — 74177 CT ABD & PELVIS W/CONTRAST: CPT

## 2018-07-25 PROCEDURE — 85730 THROMBOPLASTIN TIME PARTIAL: CPT

## 2018-07-25 PROCEDURE — 99238 HOSP IP/OBS DSCHRG MGMT 30/<: CPT

## 2018-07-25 PROCEDURE — 84132 ASSAY OF SERUM POTASSIUM: CPT

## 2018-07-25 PROCEDURE — 96375 TX/PRO/DX INJ NEW DRUG ADDON: CPT | Mod: XU

## 2018-07-25 PROCEDURE — 86850 RBC ANTIBODY SCREEN: CPT

## 2018-07-25 PROCEDURE — 85610 PROTHROMBIN TIME: CPT

## 2018-07-25 RX ADMIN — Medication 1 DROP(S): at 04:04

## 2018-07-25 RX ADMIN — RANOLAZINE 500 MILLIGRAM(S): 500 TABLET, FILM COATED, EXTENDED RELEASE ORAL at 05:23

## 2018-07-25 RX ADMIN — Medication 81 MILLIGRAM(S): at 12:50

## 2018-07-25 RX ADMIN — Medication 650 MILLIGRAM(S): at 05:23

## 2018-07-25 RX ADMIN — Medication 650 MILLIGRAM(S): at 05:53

## 2018-07-25 RX ADMIN — CLOPIDOGREL BISULFATE 75 MILLIGRAM(S): 75 TABLET, FILM COATED ORAL at 12:50

## 2018-07-25 RX ADMIN — HEPARIN SODIUM 5000 UNIT(S): 5000 INJECTION INTRAVENOUS; SUBCUTANEOUS at 05:23

## 2018-07-25 RX ADMIN — PANTOPRAZOLE SODIUM 40 MILLIGRAM(S): 20 TABLET, DELAYED RELEASE ORAL at 05:23

## 2018-07-25 RX ADMIN — FINASTERIDE 5 MILLIGRAM(S): 5 TABLET, FILM COATED ORAL at 12:50

## 2018-07-25 NOTE — PROGRESS NOTE ADULT - ASSESSMENT
88y Male presents with a SBO, found to have ileal thickening near the previous anastomosis from his SBR.  Hemodynamically stable. NGT removed and diet advanced.     - Low fiber diet   - Monitor GI function  - OOB  - DVT ppx  - saline eye drops  - PO Tylenol  - f/u with outpt PMD for creatinine 1.48

## 2018-07-25 NOTE — PROGRESS NOTE ADULT - SUBJECTIVE AND OBJECTIVE BOX
Trauma Team Surgery Progress Note     Subjective/24hour Events: No acute events overnight. Feeling well. Tolerating low fiber diet. No N/V. +/+ GI.     Vital Signs:  Vital Signs Last 24 Hrs  T(C): 36.5 (25 Jul 2018 09:15), Max: 36.9 (24 Jul 2018 21:21)  T(F): 97.7 (25 Jul 2018 09:15), Max: 98.4 (24 Jul 2018 21:21)  HR: 73 (25 Jul 2018 09:15) (58 - 73)  BP: 135/69 (25 Jul 2018 09:15) (105/57 - 135/69)  BP(mean): --  RR: 17 (25 Jul 2018 09:15) (17 - 18)  SpO2: 95% (25 Jul 2018 09:15) (95% - 98%)    CAPILLARY BLOOD GLUCOSE          I&O's Detail    24 Jul 2018 07:01  -  25 Jul 2018 07:00  --------------------------------------------------------  IN:    Oral Fluid: 1800 mL  Total IN: 1800 mL    OUT:  Total OUT: 0 mL    Total NET: 1800 mL            MEDICATIONS  (STANDING):  acetaminophen   Tablet. 650 milliGRAM(s) Oral every 6 hours  artificial tears (preservative free) Ophthalmic Solution 1 Drop(s) Both EYES four times a day  aspirin enteric coated 81 milliGRAM(s) Oral daily  clopidogrel Tablet 75 milliGRAM(s) Oral daily  finasteride 5 milliGRAM(s) Oral daily  heparin  Injectable 5000 Unit(s) SubCutaneous every 8 hours  ondansetron Injectable 4 milliGRAM(s) IV Push once  pantoprazole    Tablet 40 milliGRAM(s) Oral before breakfast  ranolazine 500 milliGRAM(s) Oral two times a day  tamsulosin 0.4 milliGRAM(s) Oral at bedtime    MEDICATIONS  (PRN):  buDESOnide  80 MICROgram(s)/formoterol 4.5 MICROgram(s) Inhaler 2 Puff(s) Inhalation two times a day PRN cough        Physical Exam:  Gen: NAD  LS: normal respiratory effort  Card: pulse regularly present  GI: soft, nontender  Ext: warm        Labs:    07-25    141  |  107  |  14  ----------------------------<  87  4.4   |  25  |  1.48<H>    Ca    8.4      25 Jul 2018 07:26  Phos  3.2     07-25  Mg     2.2     07-25                              11.0   4.2   )-----------( 192      ( 25 Jul 2018 07:26 )             34.0

## 2018-07-31 ENCOUNTER — RX RENEWAL (OUTPATIENT)
Age: 83
End: 2018-07-31

## 2018-08-01 ENCOUNTER — RX RENEWAL (OUTPATIENT)
Age: 83
End: 2018-08-01

## 2018-08-02 ENCOUNTER — APPOINTMENT (OUTPATIENT)
Dept: INTERNAL MEDICINE | Facility: CLINIC | Age: 83
End: 2018-08-02
Payer: MEDICARE

## 2018-08-02 VITALS
HEIGHT: 65 IN | SYSTOLIC BLOOD PRESSURE: 134 MMHG | TEMPERATURE: 97.9 F | DIASTOLIC BLOOD PRESSURE: 71 MMHG | HEART RATE: 58 BPM | BODY MASS INDEX: 21.66 KG/M2 | WEIGHT: 130 LBS

## 2018-08-02 DIAGNOSIS — K56.609 UNSPECIFIED INTESTINAL OBSTRUCTION, UNSPECIFIED AS TO PARTIAL VERSUS COMPLETE OBSTRUCTION: ICD-10-CM

## 2018-08-02 DIAGNOSIS — Z00.00 ENCOUNTER FOR GENERAL ADULT MEDICAL EXAMINATION W/OUT ABNORMAL FINDINGS: ICD-10-CM

## 2018-08-02 PROCEDURE — 36415 COLL VENOUS BLD VENIPUNCTURE: CPT

## 2018-08-02 PROCEDURE — 99214 OFFICE O/P EST MOD 30 MIN: CPT | Mod: 25

## 2018-08-02 RX ORDER — PREDNISONE 10 MG/1
10 TABLET ORAL
Qty: 39 | Refills: 0 | Status: DISCONTINUED | COMMUNITY
Start: 2018-01-12 | End: 2018-08-02

## 2018-08-02 NOTE — ASSESSMENT
[FreeTextEntry1] : Patient with hx of small bowel obstruction many times in the past following resection of a bezoar. Recovering after NPO NGT decompression.  Continue medication from prior to the hospital admission.

## 2018-08-02 NOTE — HISTORY OF PRESENT ILLNESS
[Post-hospitalization from ___ Hospital] : Post-hospitalization from [unfilled] Hospital [Admitted on: ___] : The patient was admitted on [unfilled] [Discharged on ___] : discharged on [unfilled] [Discharge Summary] : discharge summary [Pertinent Labs] : pertinent labs [Radiology Findings] : radiology findings [Discharge Med List] : discharge medication list [Other: ____] : [unfilled] [Med Reconciliation] : medication reconciliation has been completed [FreeTextEntry2] : Was in hospital in for SBO was in surgical service for 2 days for NGT and NPO.  \par \par Eating and drinking the same as he did before the SBO,  \par Wife passed away just before the SBO incident. Patient seems to be handling it well enough has got his composure on most days.  .

## 2018-08-06 LAB
25(OH)D3 SERPL-MCNC: 22 NG/ML
ALBUMIN SERPL ELPH-MCNC: 3.9 G/DL
ALP BLD-CCNC: 78 U/L
ALT SERPL-CCNC: 8 U/L
ANION GAP SERPL CALC-SCNC: 13 MMOL/L
AST SERPL-CCNC: 14 U/L
BASOPHILS # BLD AUTO: 0.04 K/UL
BASOPHILS NFR BLD AUTO: 0.7 %
BILIRUB SERPL-MCNC: 0.4 MG/DL
BUN SERPL-MCNC: 18 MG/DL
CALCIUM SERPL-MCNC: 9.1 MG/DL
CHLORIDE SERPL-SCNC: 103 MMOL/L
CHOLEST SERPL-MCNC: 152 MG/DL
CHOLEST/HDLC SERPL: 4.6 RATIO
CO2 SERPL-SCNC: 24 MMOL/L
CREAT SERPL-MCNC: 1.39 MG/DL
EOSINOPHIL # BLD AUTO: 0.25 K/UL
EOSINOPHIL NFR BLD AUTO: 4.1 %
GLUCOSE SERPL-MCNC: 91 MG/DL
HCT VFR BLD CALC: 36.2 %
HDLC SERPL-MCNC: 33 MG/DL
HGB BLD-MCNC: 11.4 G/DL
IMM GRANULOCYTES NFR BLD AUTO: 0.5 %
LDLC SERPL CALC-MCNC: 83 MG/DL
LYMPHOCYTES # BLD AUTO: 1.31 K/UL
LYMPHOCYTES NFR BLD AUTO: 21.4 %
MAN DIFF?: NORMAL
MCHC RBC-ENTMCNC: 29.5 PG
MCHC RBC-ENTMCNC: 31.5 GM/DL
MCV RBC AUTO: 93.8 FL
MONOCYTES # BLD AUTO: 0.52 K/UL
MONOCYTES NFR BLD AUTO: 8.5 %
NEUTROPHILS # BLD AUTO: 3.97 K/UL
NEUTROPHILS NFR BLD AUTO: 64.8 %
PLATELET # BLD AUTO: 261 K/UL
POTASSIUM SERPL-SCNC: 5 MMOL/L
PROT SERPL-MCNC: 6.2 G/DL
RBC # BLD: 3.86 M/UL
RBC # FLD: 15.1 %
SAVE SPECIMEN: NORMAL
SODIUM SERPL-SCNC: 140 MMOL/L
T4 SERPL-MCNC: 6.1 UG/DL
TRIGL SERPL-MCNC: 181 MG/DL
TSH SERPL-ACNC: 3.7 UIU/ML
VIT B12 SERPL-MCNC: 1434 PG/ML
WBC # FLD AUTO: 6.12 K/UL

## 2018-08-20 ENCOUNTER — NON-APPOINTMENT (OUTPATIENT)
Age: 83
End: 2018-08-20

## 2018-08-20 ENCOUNTER — APPOINTMENT (OUTPATIENT)
Dept: CARDIOLOGY | Facility: CLINIC | Age: 83
End: 2018-08-20
Payer: MEDICARE

## 2018-08-20 VITALS
TEMPERATURE: 97.5 F | OXYGEN SATURATION: 98 % | HEART RATE: 72 BPM | SYSTOLIC BLOOD PRESSURE: 126 MMHG | DIASTOLIC BLOOD PRESSURE: 64 MMHG | WEIGHT: 133 LBS | BODY MASS INDEX: 22.13 KG/M2

## 2018-08-20 PROCEDURE — 99214 OFFICE O/P EST MOD 30 MIN: CPT

## 2018-08-20 PROCEDURE — 93000 ELECTROCARDIOGRAM COMPLETE: CPT

## 2018-08-20 RX ORDER — CARBOXYMETHYLCELLULOSE SODIUM 10 MG/ML
SOLUTION/ DROPS OPHTHALMIC
Refills: 0 | Status: DISCONTINUED | COMMUNITY

## 2018-08-28 ENCOUNTER — OTHER (OUTPATIENT)
Age: 83
End: 2018-08-28

## 2018-08-29 ENCOUNTER — APPOINTMENT (OUTPATIENT)
Dept: DERMATOLOGY | Facility: CLINIC | Age: 83
End: 2018-08-29
Payer: MEDICARE

## 2018-08-29 VITALS
WEIGHT: 129 LBS | DIASTOLIC BLOOD PRESSURE: 64 MMHG | BODY MASS INDEX: 21.49 KG/M2 | HEIGHT: 65 IN | SYSTOLIC BLOOD PRESSURE: 110 MMHG

## 2018-08-29 DIAGNOSIS — Z85.828 PERSONAL HISTORY OF OTHER MALIGNANT NEOPLASM OF SKIN: ICD-10-CM

## 2018-08-29 PROCEDURE — 99212 OFFICE O/P EST SF 10 MIN: CPT | Mod: 25

## 2018-08-29 PROCEDURE — 17000 DESTRUCT PREMALG LESION: CPT

## 2018-08-29 PROCEDURE — 17003 DESTRUCT PREMALG LES 2-14: CPT

## 2018-09-28 NOTE — ED PROVIDER NOTE - PMH
Angina of effort    CAD (coronary artery disease) of bypass graft    CAD S/P percutaneous coronary angioplasty    Essential hypertension, benign    Gastric motility disorder    GERD (gastroesophageal reflux disease)    Hx of benign prostatic hypertrophy    Hyperlipidemia, unspecified hyperlipidemia type    Small bowel obstruction due to adhesions Detail Level: Zone Azithromycin Counseling:  I discussed with the patient the risks of azithromycin including but not limited to GI upset, allergic reaction, drug rash, diarrhea, and yeast infections. Tazorac Counseling:  Patient advised that medication is irritating and drying.  Patient may need to apply sparingly and wash off after an hour before eventually leaving it on overnight.  The patient verbalized understanding of the proper use and possible adverse effects of tazorac.  All of the patient's questions and concerns were addressed. Tazorac Pregnancy And Lactation Text: This medication is not safe during pregnancy. It is unknown if this medication is excreted in breast milk. Azithromycin Pregnancy And Lactation Text: This medication is considered safe during pregnancy and is also secreted in breast milk. Minocycline Pregnancy And Lactation Text: This medication is Pregnancy Category D and not consider safe during pregnancy. It is also excreted in breast milk. Topical Retinoid counseling:  Patient advised to apply a pea-sized amount only at bedtime and wait 30 minutes after washing their face before applying.  If too drying, patient may add a non-comedogenic moisturizer. The patient verbalized understanding of the proper use and possible adverse effects of retinoids.  All of the patient's questions and concerns were addressed. Bactrim Pregnancy And Lactation Text: This medication is Pregnancy Category D and is known to cause fetal risk.  It is also excreted in breast milk. Spironolactone Pregnancy And Lactation Text: This medication can cause feminization of the male fetus and should be avoided during pregnancy. The active metabolite is also found in breast milk. Isotretinoin Counseling: Patient should get monthly blood tests, not donate blood, not drive at night if vision affected, not share medication, and not undergo elective surgery for 6 months after tx completed. Side effects reviewed, pt to contact office should one occur. Minocycline Counseling: Patient advised regarding possible photosensitivity and discoloration of the teeth, skin, lips, tongue and gums.  Patient instructed to avoid sunlight, if possible.  When exposed to sunlight, patients should wear protective clothing, sunglasses, and sunscreen.  The patient was instructed to call the office immediately if the following severe adverse effects occur:  hearing changes, easy bruising/bleeding, severe headache, or vision changes.  The patient verbalized understanding of the proper use and possible adverse effects of minocycline.  All of the patient's questions and concerns were addressed. Topical Sulfur Applications Counseling: Topical Sulfur Counseling: Patient counseled that this medication may cause skin irritation or allergic reactions.  In the event of skin irritation, the patient was advised to reduce the amount of the drug applied or use it less frequently.   The patient verbalized understanding of the proper use and possible adverse effects of topical sulfur application.  All of the patient's questions and concerns were addressed. Tetracycline Counseling: Patient counseled regarding possible photosensitivity and increased risk for sunburn.  Patient instructed to avoid sunlight, if possible.  When exposed to sunlight, patients should wear protective clothing, sunglasses, and sunscreen.  The patient was instructed to call the office immediately if the following severe adverse effects occur:  hearing changes, easy bruising/bleeding, severe headache, or vision changes.  The patient verbalized understanding of the proper use and possible adverse effects of tetracycline.  All of the patient's questions and concerns were addressed. Patient understands to avoid pregnancy while on therapy due to potential birth defects. Benzoyl Peroxide Counseling: Patient counseled that medicine may cause skin irritation and bleach clothing.  In the event of skin irritation, the patient was advised to reduce the amount of the drug applied or use it less frequently.   The patient verbalized understanding of the proper use and possible adverse effects of benzoyl peroxide.  All of the patient's questions and concerns were addressed. Topical Clindamycin Counseling: Patient counseled that this medication may cause skin irritation or allergic reactions.  In the event of skin irritation, the patient was advised to reduce the amount of the drug applied or use it less frequently.   The patient verbalized understanding of the proper use and possible adverse effects of clindamycin.  All of the patient's questions and concerns were addressed. Topical Clindamycin Pregnancy And Lactation Text: This medication is Pregnancy Category B and is considered safe during pregnancy. It is unknown if it is excreted in breast milk. Birth Control Pills Pregnancy And Lactation Text: This medication should be avoided if pregnant and for the first 30 days post-partum. Dapsone Pregnancy And Lactation Text: This medication is Pregnancy Category C and is not considered safe during pregnancy or breast feeding. Doxycycline Counseling:  Patient counseled regarding possible photosensitivity and increased risk for sunburn.  Patient instructed to avoid sunlight, if possible.  When exposed to sunlight, patients should wear protective clothing, sunglasses, and sunscreen.  The patient was instructed to call the office immediately if the following severe adverse effects occur:  hearing changes, easy bruising/bleeding, severe headache, or vision changes.  The patient verbalized understanding of the proper use and possible adverse effects of doxycycline.  All of the patient's questions and concerns were addressed. Spironolactone Counseling: Patient advised regarding risks of diarrhea, abdominal pain, hyperkalemia, birth defects (for female patients), liver toxicity and renal toxicity. The patient may need blood work to monitor liver and kidney function and potassium levels while on therapy. The patient verbalized understanding of the proper use and possible adverse effects of spironolactone.  All of the patient's questions and concerns were addressed. Topical Retinoid Pregnancy And Lactation Text: This medication is Pregnancy Category C. It is unknown if this medication is excreted in breast milk. Bactrim Counseling:  I discussed with the patient the risks of sulfa antibiotics including but not limited to GI upset, allergic reaction, drug rash, diarrhea, dizziness, photosensitivity, and yeast infections.  Rarely, more serious reactions can occur including but not limited to aplastic anemia, agranulocytosis, methemoglobinemia, blood dyscrasias, liver or kidney failure, lung infiltrates or desquamative/blistering drug rashes. Dapsone Counseling: I discussed with the patient the risks of dapsone including but not limited to hemolytic anemia, agranulocytosis, rashes, methemoglobinemia, kidney failure, peripheral neuropathy, headaches, GI upset, and liver toxicity.  Patients who start dapsone require monitoring including baseline LFTs and weekly CBCs for the first month, then every month thereafter.  The patient verbalized understanding of the proper use and possible adverse effects of dapsone.  All of the patient's questions and concerns were addressed. High Dose Vitamin A Counseling: Side effects reviewed, pt to contact office should one occur. Topical Sulfur Applications Pregnancy And Lactation Text: This medication is Pregnancy Category C and has an unknown safety profile during pregnancy. It is unknown if this topical medication is excreted in breast milk. Benzoyl Peroxide Pregnancy And Lactation Text: This medication is Pregnancy Category C. It is unknown if benzoyl peroxide is excreted in breast milk. Include Pregnancy/Lactation Warning?: No High Dose Vitamin A Pregnancy And Lactation Text: High dose vitamin A therapy is contraindicated during pregnancy and breast feeding. Isotretinoin Pregnancy And Lactation Text: This medication is Pregnancy Category X and is considered extremely dangerous during pregnancy. It is unknown if it is excreted in breast milk. Birth Control Pills Counseling: Birth Control Pill Counseling: I discussed with the patient the potential side effects of OCPs including but not limited to increased risk of stroke, heart attack, thrombophlebitis, deep venous thrombosis, hepatic adenomas, breast changes, GI upset, headaches, and depression.  The patient verbalized understanding of the proper use and possible adverse effects of OCPs. All of the patient's questions and concerns were addressed. Erythromycin Counseling:  I discussed with the patient the risks of erythromycin including but not limited to GI upset, allergic reaction, drug rash, diarrhea, increase in liver enzymes, and yeast infections. Erythromycin Pregnancy And Lactation Text: This medication is Pregnancy Category B and is considered safe during pregnancy. It is also excreted in breast milk. Detail Level: Detailed Doxycycline Pregnancy And Lactation Text: This medication is Pregnancy Category D and not consider safe during pregnancy. It is also excreted in breast milk but is considered safe for shorter treatment courses.

## 2018-10-04 ENCOUNTER — APPOINTMENT (OUTPATIENT)
Dept: ORTHOPEDIC SURGERY | Facility: CLINIC | Age: 83
End: 2018-10-04
Payer: MEDICARE

## 2018-10-04 VITALS — BODY MASS INDEX: 21.66 KG/M2 | HEIGHT: 65 IN | WEIGHT: 130 LBS

## 2018-10-04 DIAGNOSIS — H35.30 UNSPECIFIED MACULAR DEGENERATION: ICD-10-CM

## 2018-10-04 DIAGNOSIS — Z96.1 PRESENCE OF INTRAOCULAR LENS: ICD-10-CM

## 2018-10-04 DIAGNOSIS — M54.16 RADICULOPATHY, LUMBAR REGION: ICD-10-CM

## 2018-10-04 DIAGNOSIS — H35.719 CENTRAL SEROUS CHORIORETINOPATHY, UNSPECIFIED EYE: ICD-10-CM

## 2018-10-04 DIAGNOSIS — M48.07 SPINAL STENOSIS, LUMBOSACRAL REGION: ICD-10-CM

## 2018-10-04 PROCEDURE — 99213 OFFICE O/P EST LOW 20 MIN: CPT

## 2018-10-24 ENCOUNTER — OUTPATIENT (OUTPATIENT)
Dept: EMERGENCY DEPT | Facility: HOSPITAL | Age: 83
LOS: 1 days | End: 2018-10-24
Payer: MEDICARE

## 2018-10-24 VITALS
DIASTOLIC BLOOD PRESSURE: 75 MMHG | RESPIRATION RATE: 20 BRPM | SYSTOLIC BLOOD PRESSURE: 145 MMHG | HEART RATE: 90 BPM | TEMPERATURE: 98 F | OXYGEN SATURATION: 96 % | WEIGHT: 128.97 LBS | HEIGHT: 66 IN

## 2018-10-24 DIAGNOSIS — I25.10 ATHEROSCLEROTIC HEART DISEASE OF NATIVE CORONARY ARTERY WITHOUT ANGINA PECTORIS: ICD-10-CM

## 2018-10-24 DIAGNOSIS — M54.30 SCIATICA, UNSPECIFIED SIDE: ICD-10-CM

## 2018-10-24 DIAGNOSIS — N18.3 CHRONIC KIDNEY DISEASE, STAGE 3 (MODERATE): ICD-10-CM

## 2018-10-24 DIAGNOSIS — K92.2 GASTROINTESTINAL HEMORRHAGE, UNSPECIFIED: ICD-10-CM

## 2018-10-24 DIAGNOSIS — Z95.1 PRESENCE OF AORTOCORONARY BYPASS GRAFT: Chronic | ICD-10-CM

## 2018-10-24 DIAGNOSIS — Z29.9 ENCOUNTER FOR PROPHYLACTIC MEASURES, UNSPECIFIED: ICD-10-CM

## 2018-10-24 DIAGNOSIS — D62 ACUTE POSTHEMORRHAGIC ANEMIA: ICD-10-CM

## 2018-10-24 DIAGNOSIS — E87.5 HYPERKALEMIA: ICD-10-CM

## 2018-10-24 DIAGNOSIS — N40.0 BENIGN PROSTATIC HYPERPLASIA WITHOUT LOWER URINARY TRACT SYMPTOMS: ICD-10-CM

## 2018-10-24 LAB
ALBUMIN SERPL ELPH-MCNC: 3.4 G/DL — SIGNIFICANT CHANGE UP (ref 3.3–5)
ALP SERPL-CCNC: 81 U/L — SIGNIFICANT CHANGE UP (ref 40–120)
ALT FLD-CCNC: 14 U/L — SIGNIFICANT CHANGE UP (ref 10–45)
ANION GAP SERPL CALC-SCNC: 10 MMOL/L — SIGNIFICANT CHANGE UP (ref 5–17)
APTT BLD: 28.8 SEC — SIGNIFICANT CHANGE UP (ref 27.5–37.4)
AST SERPL-CCNC: 25 U/L — SIGNIFICANT CHANGE UP (ref 10–40)
BASE EXCESS BLDV CALC-SCNC: -0.2 MMOL/L — SIGNIFICANT CHANGE UP (ref -2–2)
BASOPHILS # BLD AUTO: 0 K/UL — SIGNIFICANT CHANGE UP (ref 0–0.2)
BASOPHILS NFR BLD AUTO: 0.7 % — SIGNIFICANT CHANGE UP (ref 0–2)
BILIRUB SERPL-MCNC: 0.4 MG/DL — SIGNIFICANT CHANGE UP (ref 0.2–1.2)
BLD GP AB SCN SERPL QL: NEGATIVE — SIGNIFICANT CHANGE UP
BUN SERPL-MCNC: 26 MG/DL — HIGH (ref 7–23)
CA-I SERPL-SCNC: 1.14 MMOL/L — SIGNIFICANT CHANGE UP (ref 1.12–1.3)
CALCIUM SERPL-MCNC: 9 MG/DL — SIGNIFICANT CHANGE UP (ref 8.4–10.5)
CHLORIDE BLDV-SCNC: 112 MMOL/L — HIGH (ref 96–108)
CHLORIDE SERPL-SCNC: 107 MMOL/L — SIGNIFICANT CHANGE UP (ref 96–108)
CO2 BLDV-SCNC: 27 MMOL/L — SIGNIFICANT CHANGE UP (ref 22–30)
CO2 SERPL-SCNC: 23 MMOL/L — SIGNIFICANT CHANGE UP (ref 22–31)
CREAT SERPL-MCNC: 1.47 MG/DL — HIGH (ref 0.5–1.3)
EOSINOPHIL # BLD AUTO: 0.2 K/UL — SIGNIFICANT CHANGE UP (ref 0–0.5)
EOSINOPHIL NFR BLD AUTO: 3.5 % — SIGNIFICANT CHANGE UP (ref 0–6)
GAS PNL BLDV: 134 MMOL/L — LOW (ref 136–145)
GAS PNL BLDV: SIGNIFICANT CHANGE UP
GLUCOSE BLDV-MCNC: 102 MG/DL — HIGH (ref 70–99)
GLUCOSE SERPL-MCNC: 102 MG/DL — HIGH (ref 70–99)
HCO3 BLDV-SCNC: 25 MMOL/L — SIGNIFICANT CHANGE UP (ref 21–29)
HCT VFR BLD CALC: 34.2 % — LOW (ref 39–50)
HCT VFR BLD CALC: 34.6 % — LOW (ref 39–50)
HCT VFR BLDA CALC: 36 % — LOW (ref 39–50)
HGB BLD CALC-MCNC: 11.7 G/DL — LOW (ref 13–17)
HGB BLD-MCNC: 11.3 G/DL — LOW (ref 13–17)
HGB BLD-MCNC: 11.5 G/DL — LOW (ref 13–17)
INR BLD: 1.04 RATIO — SIGNIFICANT CHANGE UP (ref 0.88–1.16)
LACTATE BLDV-MCNC: 1.8 MMOL/L — SIGNIFICANT CHANGE UP (ref 0.7–2)
LYMPHOCYTES # BLD AUTO: 1.3 K/UL — SIGNIFICANT CHANGE UP (ref 1–3.3)
LYMPHOCYTES # BLD AUTO: 20.8 % — SIGNIFICANT CHANGE UP (ref 13–44)
MAGNESIUM SERPL-MCNC: 2.1 MG/DL — SIGNIFICANT CHANGE UP (ref 1.6–2.6)
MCHC RBC-ENTMCNC: 30.7 PG — SIGNIFICANT CHANGE UP (ref 27–34)
MCHC RBC-ENTMCNC: 31.3 PG — SIGNIFICANT CHANGE UP (ref 27–34)
MCHC RBC-ENTMCNC: 32.7 GM/DL — SIGNIFICANT CHANGE UP (ref 32–36)
MCHC RBC-ENTMCNC: 33.6 GM/DL — SIGNIFICANT CHANGE UP (ref 32–36)
MCV RBC AUTO: 93.2 FL — SIGNIFICANT CHANGE UP (ref 80–100)
MCV RBC AUTO: 93.6 FL — SIGNIFICANT CHANGE UP (ref 80–100)
MONOCYTES # BLD AUTO: 0.6 K/UL — SIGNIFICANT CHANGE UP (ref 0–0.9)
MONOCYTES NFR BLD AUTO: 10 % — SIGNIFICANT CHANGE UP (ref 2–14)
NEUTROPHILS # BLD AUTO: 4.1 K/UL — SIGNIFICANT CHANGE UP (ref 1.8–7.4)
NEUTROPHILS NFR BLD AUTO: 65.1 % — SIGNIFICANT CHANGE UP (ref 43–77)
OB PNL STL: POSITIVE
PCO2 BLDV: 48 MMHG — SIGNIFICANT CHANGE UP (ref 35–50)
PH BLDV: 7.34 — LOW (ref 7.35–7.45)
PHOSPHATE SERPL-MCNC: 3.2 MG/DL — SIGNIFICANT CHANGE UP (ref 2.5–4.5)
PLATELET # BLD AUTO: 232 K/UL — SIGNIFICANT CHANGE UP (ref 150–400)
PLATELET # BLD AUTO: 232 K/UL — SIGNIFICANT CHANGE UP (ref 150–400)
PO2 BLDV: 29 MMHG — SIGNIFICANT CHANGE UP (ref 25–45)
POTASSIUM BLDV-SCNC: 4.3 MMOL/L — SIGNIFICANT CHANGE UP (ref 3.5–5.3)
POTASSIUM SERPL-MCNC: 5.5 MMOL/L — HIGH (ref 3.5–5.3)
POTASSIUM SERPL-SCNC: 5.5 MMOL/L — HIGH (ref 3.5–5.3)
PROT SERPL-MCNC: 5.9 G/DL — LOW (ref 6–8.3)
PROTHROM AB SERPL-ACNC: 11.2 SEC — SIGNIFICANT CHANGE UP (ref 9.8–12.7)
RBC # BLD: 3.67 M/UL — LOW (ref 4.2–5.8)
RBC # BLD: 3.69 M/UL — LOW (ref 4.2–5.8)
RBC # FLD: 13.3 % — SIGNIFICANT CHANGE UP (ref 10.3–14.5)
RBC # FLD: 13.4 % — SIGNIFICANT CHANGE UP (ref 10.3–14.5)
RH IG SCN BLD-IMP: POSITIVE — SIGNIFICANT CHANGE UP
SAO2 % BLDV: 44 % — LOW (ref 67–88)
SODIUM SERPL-SCNC: 140 MMOL/L — SIGNIFICANT CHANGE UP (ref 135–145)
WBC # BLD: 5.3 K/UL — SIGNIFICANT CHANGE UP (ref 3.8–10.5)
WBC # BLD: 6.4 K/UL — SIGNIFICANT CHANGE UP (ref 3.8–10.5)
WBC # FLD AUTO: 5.3 K/UL — SIGNIFICANT CHANGE UP (ref 3.8–10.5)
WBC # FLD AUTO: 6.4 K/UL — SIGNIFICANT CHANGE UP (ref 3.8–10.5)

## 2018-10-24 RX ORDER — TAMSULOSIN HYDROCHLORIDE 0.4 MG/1
0.4 CAPSULE ORAL AT BEDTIME
Qty: 0 | Refills: 0 | Status: DISCONTINUED | OUTPATIENT
Start: 2018-10-24 | End: 2018-10-25

## 2018-10-24 RX ORDER — PANTOPRAZOLE SODIUM 20 MG/1
40 TABLET, DELAYED RELEASE ORAL
Qty: 0 | Refills: 0 | Status: DISCONTINUED | OUTPATIENT
Start: 2018-10-24 | End: 2018-10-25

## 2018-10-24 RX ORDER — SODIUM CHLORIDE 9 MG/ML
1000 INJECTION INTRAMUSCULAR; INTRAVENOUS; SUBCUTANEOUS
Qty: 0 | Refills: 0 | Status: DISCONTINUED | OUTPATIENT
Start: 2018-10-24 | End: 2018-10-25

## 2018-10-24 RX ORDER — FINASTERIDE 5 MG/1
5 TABLET, FILM COATED ORAL DAILY
Qty: 0 | Refills: 0 | Status: DISCONTINUED | OUTPATIENT
Start: 2018-10-24 | End: 2018-10-25

## 2018-10-24 RX ORDER — ACETAMINOPHEN 500 MG
650 TABLET ORAL EVERY 6 HOURS
Qty: 0 | Refills: 0 | Status: DISCONTINUED | OUTPATIENT
Start: 2018-10-24 | End: 2018-10-25

## 2018-10-24 RX ORDER — RANOLAZINE 500 MG/1
500 TABLET, FILM COATED, EXTENDED RELEASE ORAL
Qty: 0 | Refills: 0 | Status: DISCONTINUED | OUTPATIENT
Start: 2018-10-24 | End: 2018-10-25

## 2018-10-24 RX ORDER — GABAPENTIN 400 MG/1
200 CAPSULE ORAL
Qty: 0 | Refills: 0 | Status: DISCONTINUED | OUTPATIENT
Start: 2018-10-24 | End: 2018-10-25

## 2018-10-24 RX ADMIN — PANTOPRAZOLE SODIUM 40 MILLIGRAM(S): 20 TABLET, DELAYED RELEASE ORAL at 18:26

## 2018-10-24 RX ADMIN — SODIUM CHLORIDE 100 MILLILITER(S): 9 INJECTION INTRAMUSCULAR; INTRAVENOUS; SUBCUTANEOUS at 20:11

## 2018-10-24 RX ADMIN — TAMSULOSIN HYDROCHLORIDE 0.4 MILLIGRAM(S): 0.4 CAPSULE ORAL at 21:46

## 2018-10-24 RX ADMIN — RANOLAZINE 500 MILLIGRAM(S): 500 TABLET, FILM COATED, EXTENDED RELEASE ORAL at 21:46

## 2018-10-24 RX ADMIN — SODIUM CHLORIDE 100 MILLILITER(S): 9 INJECTION INTRAMUSCULAR; INTRAVENOUS; SUBCUTANEOUS at 17:38

## 2018-10-24 RX ADMIN — GABAPENTIN 200 MILLIGRAM(S): 400 CAPSULE ORAL at 20:10

## 2018-10-24 NOTE — CONSULT NOTE ADULT - ATTENDING COMMENTS
Patient seen and examined. Agree with above. Plan for EGD tomorrow to evaluate source of likely upper GI bleeding. Patient had colonoscopy within 1 year that was unremarkable. Would continue PPI continuous infusion, serial CBC, NPO after midnight.

## 2018-10-24 NOTE — H&P ADULT - PROBLEM SELECTOR PLAN 8
-DVT PPX: SCDs in the setting of bleeding  -DIET: NPO pending Endoscopy  -DISPO: PT Eval pending medical work-up  -CODE STATUS: Full Code -DVT PPX: SCDs in the setting of bleeding  -DIET: NPO after midnight; pending Endoscopy  -DISPO: PT Eval pending medical work-up  -CODE STATUS: Full Code

## 2018-10-24 NOTE — H&P ADULT - ASSESSMENT
Pt is a 87 yo M w/ CAD s/p CABG of 2 vessel & stents x2 (>10yrs ago), BPH, asthma, sciatica, MAC s/p triple therapy however chronically infected as per Pt p/w dark stools and hematochezia for 1 day, currently HD stable pending EGD. Pt is a 87 yo M w/ CAD s/p CABG of 2 vessel & stents x2 (>10yrs ago), BPH, asthma, sciatica, MAC s/p triple therapy however chronically infected as per Pt p/w dark stools and hematochezia for 1 day in setting of ASA/NSAID use, currently HD stable pending EGD. 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

## 2018-10-24 NOTE — ED PROVIDER NOTE - PROGRESS NOTE DETAILS
Email sent for [facility] GI consult awaiting call back  Mahi Almaguer PA-C Received call back for LYLE Ocasio, states pt shold be admitted. Will likely scope tomorrow  Mahi Almaguer PA-C

## 2018-10-24 NOTE — H&P ADULT - PROBLEM SELECTOR PLAN 1
Pt w/ acute GIB in setting of ASA/NSAID use. Currently HD stable. Hgb: 11.5 Baseline: ~11.   - Will trend CBC q8hr  - Will transfuse prn with goal hgb >7. Pt w/ acute GIB in setting of ASA/NSAID use. Currently HD stable. Hgb: 11.5 Baseline: ~11.   - Will trend CBC Q8H  - Will transfuse PRN with goal Hgb >7.

## 2018-10-24 NOTE — ED PROVIDER NOTE - PHYSICAL EXAMINATION
CONSTITUTIONAL: Patient is awake, alert and oriented x 3. Patient is well appearing and in no acute distress.  HEAD: NCAT,   EYES: PERRL b/l, EOMI,   ENT:Airway patent, Nasal mucosa clear. Mouth with normal mucosa. Throat has no vesicles, no oropharyngeal exudates and uvula is midline.  NECK: supple,   LUNGS: CTA B/L,  HEART: RRR.+S1S2 no murmurs,   ABDOMEN: Soft nd/nt+bs no rebound or guarding.   RECTAL (chaperone RN Lilia): Brown stool, rectal vault non-tender to palpation, bedside guaiac +  EXTREMITY: no edema or calf tenderness b/l, FROM upper and lower ext b/l  SKIN: with no rash or lesions.   NEURO: No focal deficits

## 2018-10-24 NOTE — ED ADULT NURSE NOTE - NSIMPLEMENTINTERV_GEN_ALL_ED
Implemented All Universal Safety Interventions:  [place] to call system. Call bell, personal items and telephone within reach. Instruct patient to call for assistance. Room bathroom lighting operational. Non-slip footwear when patient is off stretcher. Physically safe environment: no spills, clutter or unnecessary equipment. Stretcher in lowest position, wheels locked, appropriate side rails in place.

## 2018-10-24 NOTE — ED ADULT NURSE NOTE - OBJECTIVE STATEMENT
Pt is a 87 yo male with the co 4 episodes of dark tarry stool since last night. Pt states that he has having some straining yesterday while having a BM and is reports epigastric pressure upon palpation. Pt denies anything like this happening in the past, Pt does take aspirin daily, he stopped his Plavix 3 days ago. + BID of Motrin 200mg. He denies any CP or SOB no N/V/D no cough fever or chills no dizziness or headaches. Pt has a pmh of CAD, BPH, GERD.

## 2018-10-24 NOTE — H&P ADULT - NSHPREVIEWOFSYSTEMS_GEN_ALL_CORE
CONSTITUTIONAL:  No weight loss, fever, chills, weakness or fatigue.  HEENT:  Eyes:  No visual loss, blurred vision, double vision or yellow sclerae. Ears, Nose, Throat:  No hearing loss, sneezing, congestion, runny nose or sore throat.  SKIN:  No rash or itching.  CARDIOVASCULAR:  No chest pain, chest pressure or chest discomfort. No palpitations.  RESPIRATORY:  No shortness of breath, cough or sputum.  GASTROINTESTINAL: See HPI  GENITOURINARY:  Denies hematuria, dysuria.   NEUROLOGICAL:  No headache, dizziness, syncope, paralysis, ataxia, numbness or tingling in the extremities. No change in bowel or bladder control.  MUSCULOSKELETAL:  + sciatica  HEMATOLOGIC: See HPI  LYMPHATICS:  No enlarged nodes.   PSYCHIATRIC:  No history of depression or anxiety.  ENDOCRINOLOGIC:  No reports of sweating, cold or heat intolerance. No polyuria or polydipsia.  ALLERGIES:  No history of asthma, hives, eczema or rhinitis.

## 2018-10-24 NOTE — H&P ADULT - PROBLEM SELECTOR PLAN 2
Pt p/w hematochezia and melena x1day in setting of ASA, NSAID, plavix use. +FOBT. HD stable. Pt p/w hematochezia and melena x1day in setting of ASA, NSAID, plavix use. +FOBT. HD stable. S/p 2 large bore peripheral IV. Given hx likely 2/2 UGIB. Last colonoscopy 2017 (less likely LGIB).   - GI consulted, plan for EGD tomorrow  - NPO after midnight  - Clear liquid diet today  - PPI  - IVF w/ NS @75cc Pt p/w hematochezia and melena x1day in setting of ASA, NSAID, plavix use. +FOBT. HD stable. S/p 2 large bore peripheral IV. Given hx likely 2/2 PUD vs angioectasia Last colonoscopy 2017 (less likely LGIB).   - GI consulted, plan for EGD tomorrow  - NPO after midnight  - Clear liquid diet today  - PPI  - IVF w/ NS @75cc Pt p/w hematochezia and melena x1day in setting of ASA, NSAID, plavix use. +FOBT. HD stable. S/p 2 large bore peripheral IV. Given hx likely 2/2 PUD vs angiectasia Last colonoscopy 2017 (less likely LGIB).   - GI consulted, plan for EGD tomorrow  - NPO after midnight  - Clear liquid diet today  - PPI  - IVF w/ NS @100cc Pt p/w hematochezia and melena x1day in setting of ASA, NSAID, Plavix use. +FOBT. HD stable. S/p 2 large bore peripheral IVs placed. Given hx likely 2/2 PUD vs angiectasia; Last colonoscopy 2017 (less likely LGIB also given elevated BUN).   - GI consulted, plan for EGD tomorrow  - NPO after midnight  - Clear liquid diet today  - Protonix 40mg IVP BIC  - IVF w/ NS @100cc with close monitoring of hemodynamics  - monitor CBC Q8H

## 2018-10-24 NOTE — ED PROVIDER NOTE - OBJECTIVE STATEMENT
88 year old male w/ pmhx CAD, GERD, BPH presents to ED c/o dark tarry stools since last night. Per patient yesterday was having some straining while trying to have BM and then subsequently had 4 episodes of loose dark bloody stools overnight. He endorses mild epigastric discomfort. Denies fevers, chills, chest pain, sob, nausea, vomiting, dysuria, hematuria. Patient previously on plavix stopped 3 days ago, takes asa daily skipped todays does. Endorsees ibuprofen 200mg bid, denies other nsaids. Last colonoscopy 1 year ago     PCP: Javier Juarez  GI: Tim Smith

## 2018-10-24 NOTE — H&P ADULT - NSHPSOCIALHISTORY_GEN_ALL_CORE
Pt live alone. Pt's wife has recently passed. Pt reported no ETOH, smoking or drug use. Pt live alone. Pt's wife has recently passed. Pt reported no smoking or drug use. Pt drink 1-2 ETOH drinks/week.

## 2018-10-24 NOTE — H&P ADULT - HISTORY OF PRESENT ILLNESS
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 Pt reported that he had been taking ASA for CAD for past 15 year, additionally was taking plavi. Pt stated that he stopped the plavix after "reading this week's [employer] [employer] article." Pt additionally reported that he was taking Advil 200mg twice a day for sciatic pain. Pt reported that he had four dark bloody bowel movements overnight. Pt reported no fevers, chills, N/V/D, CP, SOB, abdominal pain, or dysuria.    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 Pt reported that he had been taking ASA for CAD for past 15 year, additionally was taking Plavix Pt stated that he stopped the Plavix after "reading this week's [employer] [place] [employer] article." Pt additionally reported that he was taking Advil 200mg twice a day for sciatic pain. Pt reported that he had four dark bloody bowel movements overnight. Pt reported no fevers, chills, N/V/D, CP, SOB, abdominal pain, or dysuria.    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 Pt reported that he had been taking ASA for CAD for past 15 year, additionally was taking Plavix Pt stated that he stopped the Plavix after "reading this week's [employer] [place] [employer] article." Pt additionally reported that he was taking Advil 200mg twice a day for sciatic pain. Pt reported that he had four dark bloody bowel movements overnight. Pt reported no fevers, chills, N/V/D, CP, SOB, abdominal pain, or dysuria.    In the ED, VS: TEMP 97.9 HR: 90 BP: 145/75 RR: 20 02 96% on RA

## 2018-10-24 NOTE — H&P ADULT - NSHPLABSRESULTS_GEN_ALL_CORE
11.5   6.4   )-----------( 232      ( 24 Oct 2018 08:36 )             34.2     10-24    140  |  107  |  26<H>  ----------------------------<  102<H>  5.5<H>   |  23  |  1.47<H>    Ca    9.0      24 Oct 2018 08:36  Phos  3.2     10-24  Mg     2.1     10-24    TPro  5.9<L>  /  Alb  3.4  /  TBili  0.4  /  DBili  x   /  AST  25  /  ALT  14  /  AlkPhos  81  10-24    CAPILLARY BLOOD GLUCOSE    PT/INR - ( 24 Oct 2018 08:36 )   PT: 11.2 sec;   INR: 1.04 ratio       PTT - ( 24 Oct 2018 08:36 )  PTT:28.8 sec

## 2018-10-24 NOTE — ED PROVIDER NOTE - MEDICAL DECISION MAKING DETAILS
Shanell: 88 year old male with loose dark stools over night. c/o mild epigastric pain. on Ibuprofen daily 200mg bid for sciatica, on daily ASA. last colonoscopy one year ago. no n/v. no sob, no dizziness, no cp.  abdomen soft nt/nd. will get labs, ivf, reassess.

## 2018-10-24 NOTE — H&P ADULT - PROBLEM SELECTOR PLAN 3
-in the setting of CKD  -EKG without changes  -trend BMP, will recheck -in the setting of CKD and likely UGIB  -EKG without changes  -trend BMP, will recheck

## 2018-10-24 NOTE — CONSULT NOTE ADULT - ASSESSMENT
Impression:  1. GI bleed- possible melena.   2. CAD on ASA, plavix self d/c 3 days ago    Plan:  1. Impression:  1. GI bleed- red blood with melena.   2. CAD on ASA, plavix self d/c 3 days ago    Plan:  1. Patient would like to defer colonoscopy if possible  2. WIll plan for EGD tomorrow  3. Please contact Dr. Lisker for cardiac clearance  4. PPI IV gtt  5. trend cbc, cmp  6. Clears today, npo after MN Impression:  1. GI bleed- red blood with melena; differential includes likely upper GI bleeding from PUD, erosive gastropathy, angieoctasia, less likely lower proximal colonic lesions  2. CAD on ASA, plavix self d/c 3 days ago    Plan:  1. Patient would like to defer colonoscopy if possible  2. WIll plan for EGD tomorrow  3. Please contact Dr. Lisker for cardiac clearance  4. PPI IV gtt  5. trend cbc, cmp, transfuse for Hgb <8  6. Clears today, npo after MN

## 2018-10-24 NOTE — H&P ADULT - NSHPPHYSICALEXAM_GEN_ALL_CORE
GENERAL APPEARANCE: NAD, laying comfortably on hospital bed  HEENT:  PERRL, EOMI. External auditory canals normal, hearing grossly intact.  NECK:  Non-tender without lymphadenopathy, masses or thyromegaly.  CARDIAC: Normal S1 and S2. No S3, S4 or murmurs. Rhythm is regular.  LUNGS: Clear to auscultation and percussion without rales, rhonchi, wheezing or diminished breath sounds.  ABDOMEN: Positive bowel sounds. Soft, nondistended, nontender. No guarding or rebound.   MUSCULOSKELETAL: . No joint erythema or tenderness.   EXTREMITIES: No significant deformity or joint abnormality. No edema. Peripheral pulses 2+. No varicosities.  NEUROLOGICAL: CN II-XII intact. Strength and sensation symmetric and intact throughout.   SKIN: Skin normal color, texture and turgor with no lesions or eruptions.  PSYCHIATRIC: AOx3.Normal affect and behavior. Vital Signs Last 24 Hrs  T(F): 98.5 (24 Oct 2018 17:21), Max: 98.5 (24 Oct 2018 12:33)  HR: 64 (24 Oct 2018 17:21) (64 - 90)  BP: 113/73 (24 Oct 2018 17:21) (113/73 - 150/76)  RR: 18 (24 Oct 2018 17:21) (18 - 20)  SpO2: 97% (24 Oct 2018 17:21) (96% - 100%)    GENERAL APPEARANCE: NAD, laying comfortably on hospital bed  HEENT:  PERRL, EOMI. External auditory canals normal, hearing grossly intact.  NECK:  Non-tender without lymphadenopathy, masses or thyromegaly.  CARDIAC: Normal S1 and S2. No S3, S4 or murmurs. Rhythm is regular.  LUNGS: Clear to auscultation and percussion without rales, rhonchi, wheezing or diminished breath sounds.  ABDOMEN: Positive bowel sounds. Soft, nondistended, nontender. No guarding or rebound.   MUSCULOSKELETAL: . No joint erythema or tenderness.   EXTREMITIES: No significant deformity or joint abnormality. No edema. Peripheral pulses 2+. No varicosities.  NEUROLOGICAL: CN II-XII intact. Strength and sensation symmetric and intact throughout.   SKIN: Skin normal color, texture and turgor with no lesions or eruptions.  PSYCHIATRIC: AOx3.Normal affect and behavior.

## 2018-10-24 NOTE — CONSULT NOTE ADULT - SUBJECTIVE AND OBJECTIVE BOX
Chief Complaint:  Patient is a 88y old  Male who presents with a chief complaint of     Interval Events: 89 y/o with CAD s/p CABG, stents yrs ago (asa, plavix dc 3 dys ago) p/w gi bleed. It started this AM with bright red that turned black and tarry. He had 4 total. There is no abd pain. He does take ibuprofen as well. He had colonoscopy ~1 yr ago but Dr. Linton for screening. Doesn't recall having EGD.    Allergies:  Cipro (Rash)  penicillins (Unknown)      Hospital Medications:      PMHX/PSHX:  Gastric motility disorder  Small bowel obstruction due to adhesions  Angina of effort  Hyperlipidemia, unspecified hyperlipidemia type  GERD (gastroesophageal reflux disease)  S/P small bowel resection  Acid reflux  Hx of benign prostatic hypertrophy  Essential hypertension, benign  CAD (coronary artery disease) of bypass graft  CAD S/P percutaneous coronary angioplasty  S/P CABG x 2  S/P small bowel resection  Acid reflux      Family history:  no pertinent history in first degree relative    ROS:     General:  No fevers, chills  Eyes:  Good vision  ENT:  No odynophagia, dysphagia  CV:  No pain, palpitations  Resp:  No dyspnea, cough, tachypnea, wheezing  GI:  See HPI  Muscle:  No pain, weakness  Skin:  No rash, edema      PHYSICAL EXAM:     GENERAL:  No distress  HEENT: conjunctivae clear  CHEST:  Full & symmetric excursion, no increased effort  HEART:  Regular rhythm  ABDOMEN:  Soft, non-tender, non-distended  EXTREMITIES:  no edema  SKIN:  Dry/warm  NEURO:  Alert    Vital Signs:  Vital Signs Last 24 Hrs  T(C): 36.8 (24 Oct 2018 08:23), Max: 36.8 (24 Oct 2018 08:23)  T(F): 98.2 (24 Oct 2018 08:23), Max: 98.2 (24 Oct 2018 08:23)  HR: 88 (24 Oct 2018 08:23) (88 - 90)  BP: 141/75 (24 Oct 2018 08:23) (141/75 - 145/75)  BP(mean): --  RR: 18 (24 Oct 2018 08:23) (18 - 20)  SpO2: 100% (24 Oct 2018 08:23) (96% - 100%)  Daily Height in cm: 167.64 (24 Oct 2018 07:40)    Daily     LABS:                        11.5   6.4   )-----------( 232      ( 24 Oct 2018 08:36 )             34.2     10-24    140  |  107  |  26<H>  ----------------------------<  102<H>  5.5<H>   |  23  |  1.47<H>    Ca    9.0      24 Oct 2018 08:36  Phos  3.2     10-24  Mg     2.1     10-24    TPro  5.9<L>  /  Alb  3.4  /  TBili  0.4  /  DBili  x   /  AST  25  /  ALT  14  /  AlkPhos  81  10-24    LIVER FUNCTIONS - ( 24 Oct 2018 08:36 )  Alb: 3.4 g/dL / Pro: 5.9 g/dL / ALK PHOS: 81 U/L / ALT: 14 U/L / AST: 25 U/L / GGT: x           PT/INR - ( 24 Oct 2018 08:36 )   PT: 11.2 sec;   INR: 1.04 ratio         PTT - ( 24 Oct 2018 08:36 )  PTT:28.8 sec        Imaging: Chief Complaint:  Patient is a 88y old  Male who presents with a chief complaint of     Interval Events: 87 y/o with CAD s/p CABG, stents yrs ago (asa, plavix dc 3 dys ago), LULU lung infection in the past, history of small bowel resection for bezor, p/w gi bleed. It started this AM with bright red that also contained black and tarry stool. He had 4 total. There is no abd pain. He does take ibuprofen as well daily for sciatica. He had colonoscopy ~1 yr ago but Dr. Linton for screening. Records reviewed - he had both colonoscopy/EGD last year with colonoscopy revealing a terminal ileum stricture. Biopsies was negative, and CT enterography did not show a cause for the stricture. He denies any nausea/vomiting or weight loss. He does take a PPI daily with the aspirin and Plavix.    Allergies:  Cipro (Rash)  penicillins (Unknown)      PMHX/PSHX:  Gastric motility disorder  Small bowel obstruction due to adhesions  Angina of effort  Hyperlipidemia, unspecified hyperlipidemia type  GERD (gastroesophageal reflux disease)  S/P small bowel resection  Acid reflux  Hx of benign prostatic hypertrophy  Essential hypertension, benign  CAD (coronary artery disease) of bypass graft  CAD S/P percutaneous coronary angioplasty  S/P CABG x 2  S/P small bowel resection  Acid reflux      Family history:  no pertinent history in first degree relative  Social: no illicit drugs    ROS:     General:  No fevers, chills  Eyes:  Good vision  ENT:  No odynophagia, dysphagia  CV:  No pain, palpitations  Resp:  No dyspnea, cough, tachypnea, wheezing  GI:  See HPI  Muscle:  No pain, weakness  Skin:  No rash, edema      PHYSICAL EXAM:     GENERAL:  No distress  HEENT: conjunctivae clear  CHEST:  Full & symmetric excursion, no increased effort  HEART:  Regular rhythm  ABDOMEN:  Soft, non-tender, non-distended  EXTREMITIES:  no edema  SKIN:  Dry/warm  NEURO:  Alert  RECTAL: Unable to perform, patient is on stretcher in the rivas in the ER    Vital Signs:  Vital Signs Last 24 Hrs  T(C): 36.8 (24 Oct 2018 08:23), Max: 36.8 (24 Oct 2018 08:23)  T(F): 98.2 (24 Oct 2018 08:23), Max: 98.2 (24 Oct 2018 08:23)  HR: 88 (24 Oct 2018 08:23) (88 - 90)  BP: 141/75 (24 Oct 2018 08:23) (141/75 - 145/75)  BP(mean): --  RR: 18 (24 Oct 2018 08:23) (18 - 20)  SpO2: 100% (24 Oct 2018 08:23) (96% - 100%)  Daily Height in cm: 167.64 (24 Oct 2018 07:40)    Daily     LABS:                        11.5   6.4   )-----------( 232      ( 24 Oct 2018 08:36 )             34.2     10-24    140  |  107  |  26<H>  ----------------------------<  102<H>  5.5<H>   |  23  |  1.47<H>    Ca    9.0      24 Oct 2018 08:36  Phos  3.2     10-24  Mg     2.1     10-24    TPro  5.9<L>  /  Alb  3.4  /  TBili  0.4  /  DBili  x   /  AST  25  /  ALT  14  /  AlkPhos  81  10-24    LIVER FUNCTIONS - ( 24 Oct 2018 08:36 )  Alb: 3.4 g/dL / Pro: 5.9 g/dL / ALK PHOS: 81 U/L / ALT: 14 U/L / AST: 25 U/L / GGT: x           PT/INR - ( 24 Oct 2018 08:36 )   PT: 11.2 sec;   INR: 1.04 ratio         PTT - ( 24 Oct 2018 08:36 )  PTT:28.8 sec

## 2018-10-24 NOTE — CONSULT NOTE ADULT - ASSESSMENT
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 No angina. No ECG changes.  88 year old with a history of CAD, remote coronary stenting now with GI bleed. Likely upper (recent colonoscopy had an ileal stricture at the site of a prior surgical SBO repair).  No active angina or CHF.  Stable CKD.  Stable Anemia despite Melena.  He is stable from a cardiovascular standpoint to proceed with EGD.  Continue asa 81 mg. Agree with no plavix or NSAIDS.  Would begin Lipitor 20 mg qd (prior myalgias with zocor). Discussed with pt.  Will follow.

## 2018-10-24 NOTE — H&P ADULT - PROBLEM SELECTOR PLAN 7
-c/w home Finasteride and Tamsulosin  -no evidence of obstruction, will check PVR given ADA to confirm -c/w home Finasteride and Tamsulosin for now with close monitoring of hemodynamics in setting of acute bleeding  -no evidence of obstruction, will check PVR given ADA to confirm and monitor Is/Os

## 2018-10-24 NOTE — H&P ADULT - PROBLEM SELECTOR PLAN 6
- - Pt hx of sciatica. Pt treated w/ NSAIDs at home.  - Will hold NSAIDs given GIB  - Tylenol PRN for pain. - Pt hx of sciatica. Pt treated w/ NSAIDs at home.  - Will hold NSAIDs given GIB  - Tylenol PRN for pain.  - C/w home Gabapentin 600mg PO TID/ Gabapentin 200 mg BID -pt hx of sciatica. Pt treated w/ NSAIDs at home.  -will hold NSAIDs given GIB  -Tylenol PRN for pain.  -c/w home Gabapentin 600mg PO TID/ Gabapentin 200 mg BID

## 2018-10-24 NOTE — CONSULT NOTE ADULT - SUBJECTIVE AND OBJECTIVE BOX
Patient seen and evaluated @ 730 pm  Chief Complaint: bloody bowel movement.    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 Pt reported that he had been taking ASA for CAD for past 15 year, additionally was taking Plavix Pt stated that he stopped the Plavix after "reading this week's [place] [employer] article." Pt additionally reported that he was taking Advil 200mg twice a day for sciatic pain. Pt reported that he had four dark bloody bowel movements overnight. Pt reported no fevers, chills, N/V/D, CP, SOB, abdominal pain, or dysuria.    He has no abdominal or chest pain. No orthopnea, PND, or leg edema.    In the ED, VS: TEMP 97.9 HR: 90 BP: 145/75 RR: 20 02 96% on RA (24 Oct 2018 13:30)    PMH:   Gastric motility disorder  Small bowel obstruction due to adhesions  Angina of effort  Hyperlipidemia, unspecified hyperlipidemia type  GERD (gastroesophageal reflux disease)  S/P small bowel resection  Acid reflux  Hx of benign prostatic hypertrophy  Essential hypertension, benign  CAD (coronary artery disease) of bypass graft  CAD S/P percutaneous coronary angioplasty    PSH:   S/P CABG x 2  S/P small bowel resection  Acid reflux    Medications:   acetaminophen   Tablet .. 650 milliGRAM(s) Oral every 6 hours PRN  finasteride 5 milliGRAM(s) Oral daily  gabapentin 200 milliGRAM(s) Oral two times a day  pantoprazole  Injectable 40 milliGRAM(s) IV Push two times a day  ranolazine 500 milliGRAM(s) Oral two times a day  sodium chloride 0.9%. 1000 milliLiter(s) IV Continuous <Continuous>  tamsulosin 0.4 milliGRAM(s) Oral at bedtime    Allergies:  Cipro (Rash)  penicillins (Unknown)    FAMILY HISTORY:  No pertinent family history in first degree relatives    Social History: Recently .  Smoking: No       Review of Systems: No orthopnea or PND.  The remainder of the ROS is negative.    Physical Exam:  T(C): 36.7 (10-24-18 @ 18:02), Max: 36.9 (10-24-18 @ 12:33)  HR: 70 (10-24-18 @ 18:02) (64 - 90)  BP: 141/70 (10-24-18 @ 18:02) (113/73 - 150/76)  RR: 18 (10-24-18 @ 18:02) (18 - 20)  SpO2: 98% (10-24-18 @ 18:02) (96% - 100%)  Wt(kg): --    Daily Height in cm: 167.64 (24 Oct 2018 07:40)    Daily     Appearance: Normal, NAD  Eyes: PERRL, EOMI, no scleral icterus   HENT: moist oral mucosa  Cardiovascular: Regular rhythm, Normal S1 and S2, no murmur, rub; no peripheral edema; no JVD  Respiratory: Clear to auscultation bilaterally  Gastrointestinal: Soft, +BS  Musculoskeletal: No clubbing   Neurologic: No focal weakness  Psychiatry: A&Ox3 with appropriate mood and affect  Skin: No rashes, ecchymoses, or cyanosis    Cardiovascular Diagnostic Testing:  ECG:     Echo/Stress: Nov 16, 2106: Normal exercise capacity (6 min Johnny protocol) with normal EF and normal valvular function.      Labs:                        11.3   5.3   )-----------( 232      ( 24 Oct 2018 18:39 )             34.6     10-24    140  |  107  |  26<H>  ----------------------------<  102<H>  5.5<H>   |  23  |  1.47<H>    Ca    9.0      24 Oct 2018 08:36  Phos  3.2     10-24  Mg     2.1     10-24    TPro  5.9<L>  /  Alb  3.4  /  TBili  0.4  /  DBili  x   /  AST  25  /  ALT  14  /  AlkPhos  81  10-24    PT/INR - ( 24 Oct 2018 08:36 )   PT: 11.2 sec;   INR: 1.04 ratio         PTT - ( 24 Oct 2018 08:36 )  PTT:28.8 sec

## 2018-10-24 NOTE — H&P ADULT - PROBLEM SELECTOR PLAN 5
-c/w ASA given stent history, will continue to hold Plavix as stents >10yrs ago  - -c/w ASA given stent history, will continue to hold Plavix as stents >10yrs ago  - Will Consult cards for clearance prior to GI procedure per GI rec; Pt however w/ no CP, anginal sxs -c/w ASA given stent history, will continue to hold Plavix as stents >10yrs ago  - Will Consult cards for clearance prior to GI procedure per GI rec; Pt however w/ no CP, anginal sxs  - C/w home Ranexa 500mg PO BID -c/w ASA given stent history, will continue to hold Plavix as stents >10yrs ago  -will Consult cards for clearance prior to GI procedure per GI rec; Pt however w/ no CP, anginal sxs  -c/w home Ranexa 500mg PO BID

## 2018-10-24 NOTE — H&P ADULT - PROBLEM SELECTOR PLAN 4
-Cr elevated but necessarily ADA given baseline ~1.3  -trend Cr, renally dose meds, avoid nephrotoxins  -if not improving can check urine lytes to clarify etiology

## 2018-10-25 ENCOUNTER — TRANSCRIPTION ENCOUNTER (OUTPATIENT)
Age: 83
End: 2018-10-25

## 2018-10-25 VITALS — WEIGHT: 119.05 LBS

## 2018-10-25 LAB
ANION GAP SERPL CALC-SCNC: 9 MMOL/L — SIGNIFICANT CHANGE UP (ref 5–17)
APPEARANCE UR: CLEAR — SIGNIFICANT CHANGE UP
BACTERIA # UR AUTO: NEGATIVE — SIGNIFICANT CHANGE UP
BILIRUB UR-MCNC: NEGATIVE — SIGNIFICANT CHANGE UP
BUN SERPL-MCNC: 16 MG/DL — SIGNIFICANT CHANGE UP (ref 7–23)
CALCIUM SERPL-MCNC: 8.2 MG/DL — LOW (ref 8.4–10.5)
CHLORIDE SERPL-SCNC: 109 MMOL/L — HIGH (ref 96–108)
CO2 SERPL-SCNC: 21 MMOL/L — LOW (ref 22–31)
COLOR SPEC: SIGNIFICANT CHANGE UP
CREAT SERPL-MCNC: 1.29 MG/DL — SIGNIFICANT CHANGE UP (ref 0.5–1.3)
DIFF PNL FLD: NEGATIVE — SIGNIFICANT CHANGE UP
EPI CELLS # UR: 1 /HPF — SIGNIFICANT CHANGE UP
GLUCOSE SERPL-MCNC: 99 MG/DL — SIGNIFICANT CHANGE UP (ref 70–99)
GLUCOSE UR QL: NEGATIVE — SIGNIFICANT CHANGE UP
HCT VFR BLD CALC: 30.6 % — LOW (ref 39–50)
HCT VFR BLD CALC: 32.1 % — LOW (ref 39–50)
HGB BLD-MCNC: 10.7 G/DL — LOW (ref 13–17)
HGB BLD-MCNC: 10.9 G/DL — LOW (ref 13–17)
HYALINE CASTS # UR AUTO: 0 /LPF — SIGNIFICANT CHANGE UP (ref 0–2)
KETONES UR-MCNC: NEGATIVE — SIGNIFICANT CHANGE UP
LEUKOCYTE ESTERASE UR-ACNC: NEGATIVE — SIGNIFICANT CHANGE UP
MAGNESIUM SERPL-MCNC: 2 MG/DL — SIGNIFICANT CHANGE UP (ref 1.6–2.6)
MCHC RBC-ENTMCNC: 31.6 PG — SIGNIFICANT CHANGE UP (ref 27–34)
MCHC RBC-ENTMCNC: 32.5 PG — SIGNIFICANT CHANGE UP (ref 27–34)
MCHC RBC-ENTMCNC: 33.9 GM/DL — SIGNIFICANT CHANGE UP (ref 32–36)
MCHC RBC-ENTMCNC: 35 GM/DL — SIGNIFICANT CHANGE UP (ref 32–36)
MCV RBC AUTO: 92.9 FL — SIGNIFICANT CHANGE UP (ref 80–100)
MCV RBC AUTO: 93.2 FL — SIGNIFICANT CHANGE UP (ref 80–100)
NITRITE UR-MCNC: NEGATIVE — SIGNIFICANT CHANGE UP
PH UR: 6.5 — SIGNIFICANT CHANGE UP (ref 5–8)
PHOSPHATE SERPL-MCNC: 2.7 MG/DL — SIGNIFICANT CHANGE UP (ref 2.5–4.5)
PLATELET # BLD AUTO: 194 K/UL — SIGNIFICANT CHANGE UP (ref 150–400)
PLATELET # BLD AUTO: 209 K/UL — SIGNIFICANT CHANGE UP (ref 150–400)
POTASSIUM SERPL-MCNC: 4.9 MMOL/L — SIGNIFICANT CHANGE UP (ref 3.5–5.3)
POTASSIUM SERPL-SCNC: 4.9 MMOL/L — SIGNIFICANT CHANGE UP (ref 3.5–5.3)
PROT UR-MCNC: NEGATIVE — SIGNIFICANT CHANGE UP
RBC # BLD: 3.3 M/UL — LOW (ref 4.2–5.8)
RBC # BLD: 3.45 M/UL — LOW (ref 4.2–5.8)
RBC # FLD: 12.7 % — SIGNIFICANT CHANGE UP (ref 10.3–14.5)
RBC # FLD: 13.1 % — SIGNIFICANT CHANGE UP (ref 10.3–14.5)
RBC CASTS # UR COMP ASSIST: 1 /HPF — SIGNIFICANT CHANGE UP (ref 0–4)
SODIUM SERPL-SCNC: 139 MMOL/L — SIGNIFICANT CHANGE UP (ref 135–145)
SP GR SPEC: 1.02 — SIGNIFICANT CHANGE UP (ref 1.01–1.02)
UROBILINOGEN FLD QL: NEGATIVE — SIGNIFICANT CHANGE UP
WBC # BLD: 4.7 K/UL — SIGNIFICANT CHANGE UP (ref 3.8–10.5)
WBC # BLD: 4.7 K/UL — SIGNIFICANT CHANGE UP (ref 3.8–10.5)
WBC # FLD AUTO: 4.7 K/UL — SIGNIFICANT CHANGE UP (ref 3.8–10.5)
WBC # FLD AUTO: 4.7 K/UL — SIGNIFICANT CHANGE UP (ref 3.8–10.5)
WBC UR QL: 3 /HPF — SIGNIFICANT CHANGE UP (ref 0–5)

## 2018-10-25 PROCEDURE — 80053 COMPREHEN METABOLIC PANEL: CPT

## 2018-10-25 PROCEDURE — 43235 EGD DIAGNOSTIC BRUSH WASH: CPT

## 2018-10-25 PROCEDURE — 85014 HEMATOCRIT: CPT

## 2018-10-25 PROCEDURE — 84100 ASSAY OF PHOSPHORUS: CPT

## 2018-10-25 PROCEDURE — 83735 ASSAY OF MAGNESIUM: CPT

## 2018-10-25 PROCEDURE — 99285 EMERGENCY DEPT VISIT HI MDM: CPT

## 2018-10-25 PROCEDURE — 85730 THROMBOPLASTIN TIME PARTIAL: CPT

## 2018-10-25 PROCEDURE — 82947 ASSAY GLUCOSE BLOOD QUANT: CPT

## 2018-10-25 PROCEDURE — 84132 ASSAY OF SERUM POTASSIUM: CPT

## 2018-10-25 PROCEDURE — 86900 BLOOD TYPING SEROLOGIC ABO: CPT

## 2018-10-25 PROCEDURE — 86901 BLOOD TYPING SEROLOGIC RH(D): CPT

## 2018-10-25 PROCEDURE — 71046 X-RAY EXAM CHEST 2 VIEWS: CPT

## 2018-10-25 PROCEDURE — 93005 ELECTROCARDIOGRAM TRACING: CPT

## 2018-10-25 PROCEDURE — 82330 ASSAY OF CALCIUM: CPT

## 2018-10-25 PROCEDURE — 82272 OCCULT BLD FECES 1-3 TESTS: CPT

## 2018-10-25 PROCEDURE — 82435 ASSAY OF BLOOD CHLORIDE: CPT

## 2018-10-25 PROCEDURE — 86850 RBC ANTIBODY SCREEN: CPT

## 2018-10-25 PROCEDURE — 81001 URINALYSIS AUTO W/SCOPE: CPT

## 2018-10-25 PROCEDURE — 85027 COMPLETE CBC AUTOMATED: CPT

## 2018-10-25 PROCEDURE — 80048 BASIC METABOLIC PNL TOTAL CA: CPT

## 2018-10-25 PROCEDURE — 83605 ASSAY OF LACTIC ACID: CPT

## 2018-10-25 PROCEDURE — 84295 ASSAY OF SERUM SODIUM: CPT

## 2018-10-25 PROCEDURE — 85610 PROTHROMBIN TIME: CPT

## 2018-10-25 PROCEDURE — 82803 BLOOD GASES ANY COMBINATION: CPT

## 2018-10-25 RX ORDER — ATORVASTATIN CALCIUM 80 MG/1
1 TABLET, FILM COATED ORAL
Qty: 30 | Refills: 0 | OUTPATIENT
Start: 2018-10-25 | End: 2018-11-23

## 2018-10-25 RX ORDER — PANTOPRAZOLE SODIUM 20 MG/1
1 TABLET, DELAYED RELEASE ORAL
Qty: 30 | Refills: 0
Start: 2018-10-25 | End: 2018-11-23

## 2018-10-25 RX ORDER — ATORVASTATIN CALCIUM 80 MG/1
1 TABLET, FILM COATED ORAL
Qty: 0 | Refills: 0 | COMMUNITY
Start: 2018-10-25

## 2018-10-25 RX ORDER — ATORVASTATIN CALCIUM 80 MG/1
20 TABLET, FILM COATED ORAL AT BEDTIME
Qty: 0 | Refills: 0 | Status: DISCONTINUED | OUTPATIENT
Start: 2018-10-25 | End: 2018-10-25

## 2018-10-25 RX ORDER — PANTOPRAZOLE SODIUM 20 MG/1
8 TABLET, DELAYED RELEASE ORAL
Qty: 80 | Refills: 0 | Status: DISCONTINUED | OUTPATIENT
Start: 2018-10-25 | End: 2018-10-25

## 2018-10-25 RX ADMIN — RANOLAZINE 500 MILLIGRAM(S): 500 TABLET, FILM COATED, EXTENDED RELEASE ORAL at 18:24

## 2018-10-25 RX ADMIN — GABAPENTIN 200 MILLIGRAM(S): 400 CAPSULE ORAL at 18:06

## 2018-10-25 RX ADMIN — PANTOPRAZOLE SODIUM 40 MILLIGRAM(S): 20 TABLET, DELAYED RELEASE ORAL at 06:31

## 2018-10-25 RX ADMIN — PANTOPRAZOLE SODIUM 10 MG/HR: 20 TABLET, DELAYED RELEASE ORAL at 09:19

## 2018-10-25 RX ADMIN — RANOLAZINE 500 MILLIGRAM(S): 500 TABLET, FILM COATED, EXTENDED RELEASE ORAL at 06:31

## 2018-10-25 RX ADMIN — FINASTERIDE 5 MILLIGRAM(S): 5 TABLET, FILM COATED ORAL at 12:46

## 2018-10-25 RX ADMIN — GABAPENTIN 200 MILLIGRAM(S): 400 CAPSULE ORAL at 06:31

## 2018-10-25 NOTE — PROGRESS NOTE ADULT - PROBLEM SELECTOR PLAN 7
-c/w home Finasteride and Tamsulosin for now with close monitoring of hemodynamics in setting of acute bleeding  -no evidence of obstruction, will check PVR given ADA to confirm and monitor Is/Os - c/w home Finasteride and Tamsulosin for now with close monitoring of hemodynamics in setting of acute bleeding  - no evidence of obstruction, will check PVR given ADA to confirm and monitor Is/Os

## 2018-10-25 NOTE — DISCHARGE NOTE ADULT - PLAN OF CARE
Follow Up You were found to have an upper gastrointestinal bleed. You had a upper endoscopic procedure that revealed inflammation of your small intestine. Please continue to take your protonix 40mg daily. Please follow up with your gastroenterologist. Please avoid NSAID such as ibuprofen. Follow up You stopped your Plavix. Our gastroenterologist cleared you to continue taking your aspirin. Please follow up with your cardiologist to optimize your treatment plan.

## 2018-10-25 NOTE — DISCHARGE NOTE ADULT - CARE PROVIDERS DIRECT ADDRESSES
,DirectAddress_Unknown,DirectAddress_Unknown ,DirectAddress_Unknown,DirectAddress_Unknown,[email or web address]

## 2018-10-25 NOTE — DISCHARGE NOTE ADULT - PATIENT PORTAL LINK FT
You can access the [facility] Portal, offered by [facility], by registering with the following website: [email or web address]

## 2018-10-25 NOTE — DISCHARGE NOTE ADULT - HOSPITAL COURSE
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 Pt was HD stable throughout hospital course. Pt was placed on PPI infusion and given NS @100cc/hr. Pt was cleared by cardiologist, Dr. Lisker. Pt had EGD which reveal duodenitis. Pt tolerated the procedure well and tolerated PO post procedure. Pt started on lipitor per cards and continued on allo home meds with the exceptions of NSAIDs.     Pt examined and deemed stable to be sent home w/ plan to follow up with GI and cardiologist outpatient. 88 yoM w/ PMHx significant for CAD s/p CABG of 2 vessel & stents x2 (>10yrs ago), recurrent SBOs requiring multiple hospitalizations for NGT decompression (last in July 2018), s/p small bowel resection with removal of bezoar and later an exlap w/ SBR > 10 years ago, BPH, asthma, sciatica, MAC (s/p triple therapy however chronically infected as per Pt) p/w dark stools and hematochezia for 1 day. Pt was HD stable throughout hospital course. Pt was placed on PPI infusion and given NS @100cc/hr. Pt was cleared by cardiologist, Dr. Lisker. Pt had EGD which reveal duodenitis as the likely cause of bleeding. Pt tolerated the procedure well and tolerated PO post procedure. At discharge, patient will continue Protonix daily and was advised to discontinue his NSAID use. During his stay, patient was started on Atorvastatin 20mg daily at Cardiology recommendation, he had previously been taken off of Simvastatin due to leg pain. He was advised to follow-up with his PMD and Cardiology.    Pt examined and deemed stable to be sent home w/ plan to follow up with PMD and Cards outpatient.

## 2018-10-25 NOTE — PROGRESS NOTE ADULT - PROBLEM SELECTOR PLAN 4
-Cr elevated but necessarily ADA given baseline ~1.3  -trend Cr, renally dose meds, avoid nephrotoxins  -if not improving can check urine lytes to clarify etiology - Cr elevated but not ADA given baseline ~1.3  - trend Cr, renally dose meds, avoid nephrotoxins  - if not improving can check urine lytes to clarify etiology

## 2018-10-25 NOTE — PROGRESS NOTE ADULT - PROBLEM SELECTOR PLAN 6
-pt hx of sciatica. Pt treated w/ NSAIDs at home.  -will hold NSAIDs given GIB  -Tylenol PRN for pain.  -c/w home Gabapentin 600mg PO TID/ Gabapentin 200 mg BID - pt hx of sciatica. Pt treated w/ NSAIDs at home.  - will hold NSAIDs given GIB  - Tylenol PRN for pain.  - c/w home Gabapentin 600mg PO TID/ Gabapentin 200 mg BID

## 2018-10-25 NOTE — PROGRESS NOTE ADULT - PROBLEM SELECTOR PLAN 2
Pt p/w hematochezia and melena x1day in setting of ASA, NSAID, Plavix use. +FOBT. HD stable. S/p 2 large bore peripheral IVs placed. Given hx likely 2/2 PUD vs angiectasia; Last colonoscopy 2017 (less likely LGIB also given elevated BUN).   - GI consulted, plan for EGD today  - NPO  - Clear liquid diet today  - Protonix 40mg IVP BIC  - monitor CBC Q8H  - F/u GI rec post-procedure Pt p/w hematochezia and melena x1day in setting of ASA, NSAID, Plavix use. +FOBT. HD stable. S/p 2 large bore peripheral IVs placed. Given hx likely 2/2 PUD vs angiectasia; Last colonoscopy 2017 (less likely LGIB also given elevated BUN).   - GI consulted, plan for EGD today  - NPO  - Protonix 40mg IVP BID  - monitor CBC Q8H as above  - F/u GI recs post-procedure

## 2018-10-25 NOTE — DISCHARGE NOTE ADULT - CARE PLAN
Principal Discharge DX:	GIB (gastrointestinal bleeding)  Goal:	Follow Up  Secondary Diagnosis:	Coronary artery disease involving native coronary artery of native heart without angina pectoris Principal Discharge DX:	GIB (gastrointestinal bleeding)  Goal:	Follow Up  Assessment and plan of treatment:	You were found to have an upper gastrointestinal bleed. You had a upper endoscopic procedure that revealed inflammation of your small intestine. Please continue to take your protonix 40mg daily. Please follow up with your gastroenterologist. Please avoid NSAID such as ibuprofen.  Secondary Diagnosis:	Coronary artery disease involving native coronary artery of native heart without angina pectoris  Goal:	Follow up  Assessment and plan of treatment:	You stopped your Plavix. Our gastroenterologist cleared you to continue taking your aspirin. Please follow up with your cardiologist to optimize your treatment plan.

## 2018-10-25 NOTE — PROGRESS NOTE ADULT - ATTENDING COMMENTS
EGD performed w/o complication, findings reviewed. Patient stable and he and his daughter are amenable to discharge home today w/ plan to continue PO Protonix daily along w/ reinstatement of ASA and Plavix; pt educated to hold NSAIDs given risk. Total discharge planning time spent = 35minutes.

## 2018-10-25 NOTE — PROGRESS NOTE ADULT - PROBLEM SELECTOR PLAN 1
Pt w/ acute GIB in setting of ASA/NSAID use. Currently HD stable. Baseline: ~11.   - Will trend CBC Q8H  - Will transfuse PRN with goal Hgb >7. Pt w/ acute GIB in setting of ASA/NSAID use. Currently HD stable w/ stable H&H( Baselin Hgb: ~11).   - Will continue to trend CBC Q8H  - Will transfuse PRN with goal Hgb >7.

## 2018-10-25 NOTE — PROGRESS NOTE ADULT - ASSESSMENT
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

## 2018-10-25 NOTE — PROGRESS NOTE ADULT - SUBJECTIVE AND OBJECTIVE BOX
Patient is a 88y old  Male who presents with a chief complaint of bloody bowel movement (24 Oct 2018 19:37)    SUBJECTIVE / OVERNIGHT EVENTS: Patient seen and examined. No acute overnight events, no subjective complaints.    OBJECTIVE:  Vital Signs Last 24 Hrs  T(C): 36.8 (25 Oct 2018 05:04), Max: 36.9 (24 Oct 2018 12:33)  T(F): 98.3 (25 Oct 2018 05:04), Max: 98.5 (24 Oct 2018 12:33)  HR: 83 (25 Oct 2018 05:04) (64 - 90)  BP: 118/70 (25 Oct 2018 05:04) (113/73 - 150/76)  BP(mean): --  RR: 18 (25 Oct 2018 05:04) (18 - 20)  SpO2: 95% (25 Oct 2018 05:04) (95% - 100%)    I&O's Summary    GENERAL APPEARANCE: NAD, laying comfortably on hospital bed  CARDIAC: Normal S1 and S2. No S3, S4 or murmurs. Rhythm is regular.  LUNGS: Clear to auscultation and percussion without rales, rhonchi, wheezing or diminished breath sounds.  ABDOMEN: Positive bowel sounds. Soft, nondistended, nontender. No guarding or rebound.   EXTREMITIES:  Peripheral pulses 2+. No varicosities.  SKIN: Skin normal color, texture and turgor with no lesions or eruptions.  PSYCHIATRIC: AOx3.Normal affect and behavior.    Labs:                        10.7   4.7   )-----------( 194      ( 25 Oct 2018 04:11 )             30.6     10-25    139  |  109<H>  |  16  ----------------------------<  99  4.9   |  21<L>  |  1.29    Ca    8.2<L>      25 Oct 2018 04:11  Phos  2.7     10-25  Mg     2.0     10-25    TPro  5.9<L>  /  Alb  3.4  /  TBili  0.4  /  DBili  x   /  AST  25  /  ALT  14  /  AlkPhos  81  10-24    PT/INR - ( 24 Oct 2018 08:36 )   PT: 11.2 sec;   INR: 1.04 ratio         PTT - ( 24 Oct 2018 08:36 )  PTT:28.8 sec          Imaging Personally Reviewed:    Consultant(s) Notes Reviewed:   Care Discussed with Consultants/Other Providers:    MEDICATIONS  (STANDING):  finasteride 5 milliGRAM(s) Oral daily  gabapentin 200 milliGRAM(s) Oral two times a day  pantoprazole  Injectable 40 milliGRAM(s) IV Push two times a day  ranolazine 500 milliGRAM(s) Oral two times a day  sodium chloride 0.9%. 1000 milliLiter(s) (100 mL/Hr) IV Continuous <Continuous>  tamsulosin 0.4 milliGRAM(s) Oral at bedtime    MEDICATIONS  (PRN):  acetaminophen   Tablet .. 650 milliGRAM(s) Oral every 6 hours PRN Mild Pain (1 - 3), Moderate Pain (4 - 6), Severe Pain (7 - 10) Patient is a 88y old  Male who presents with a chief complaint of bloody bowel movement (24 Oct 2018 19:37)    SUBJECTIVE / OVERNIGHT EVENTS: Patient seen and examined. No acute overnight events, no subjective complaints. Pt had no BM overnight.     OBJECTIVE:  Vital Signs Last 24 Hrs  T(C): 36.8 (25 Oct 2018 05:04), Max: 36.9 (24 Oct 2018 12:33)  T(F): 98.3 (25 Oct 2018 05:04), Max: 98.5 (24 Oct 2018 12:33)  HR: 83 (25 Oct 2018 05:04) (64 - 90)  BP: 118/70 (25 Oct 2018 05:04) (113/73 - 150/76)  BP(mean): --  RR: 18 (25 Oct 2018 05:04) (18 - 20)  SpO2: 95% (25 Oct 2018 05:04) (95% - 100%)    I&O's Summary    GENERAL APPEARANCE: NAD, laying comfortably on hospital bed  CARDIAC: Normal S1 and S2. No S3, S4 or murmurs. Rhythm is regular.  LUNGS: Clear to auscultation and percussion without rales, rhonchi, wheezing or diminished breath sounds.  ABDOMEN: Positive bowel sounds. Soft, nondistended, nontender. No guarding or rebound.   EXTREMITIES:  Peripheral pulses 2+. No varicosities.  SKIN: Skin normal color, texture and turgor with no lesions or eruptions.  PSYCHIATRIC: AOx3.Normal affect and behavior.    Labs:                        10.7   4.7   )-----------( 194      ( 25 Oct 2018 04:11 )             30.6     10-25    139  |  109<H>  |  16  ----------------------------<  99  4.9   |  21<L>  |  1.29    Ca    8.2<L>      25 Oct 2018 04:11  Phos  2.7     10-25  Mg     2.0     10-25    TPro  5.9<L>  /  Alb  3.4  /  TBili  0.4  /  DBili  x   /  AST  25  /  ALT  14  /  AlkPhos  81  10-24    PT/INR - ( 24 Oct 2018 08:36 )   PT: 11.2 sec;   INR: 1.04 ratio         PTT - ( 24 Oct 2018 08:36 )  PTT:28.8 sec          Imaging Personally Reviewed:    Consultant(s) Notes Reviewed:   Care Discussed with Consultants/Other Providers:    MEDICATIONS  (STANDING):  finasteride 5 milliGRAM(s) Oral daily  gabapentin 200 milliGRAM(s) Oral two times a day  pantoprazole  Injectable 40 milliGRAM(s) IV Push two times a day  ranolazine 500 milliGRAM(s) Oral two times a day  sodium chloride 0.9%. 1000 milliLiter(s) (100 mL/Hr) IV Continuous <Continuous>  tamsulosin 0.4 milliGRAM(s) Oral at bedtime    MEDICATIONS  (PRN):  acetaminophen   Tablet .. 650 milliGRAM(s) Oral every 6 hours PRN Mild Pain (1 - 3), Moderate Pain (4 - 6), Severe Pain (7 - 10) Patient is a 88y old  Male who presents with a chief complaint of bloody bowel movement (24 Oct 2018 19:37)    SUBJECTIVE / OVERNIGHT EVENTS: Patient seen and examined. No acute overnight events, no subjective complaints. Pt had no BM overnight.     OBJECTIVE:  Vital Signs Last 24 Hrs  T(F): 98.3 (25 Oct 2018 05:04), Max: 98.5 (24 Oct 2018 12:33)  HR: 83 (25 Oct 2018 05:04) (64 - 90)  BP: 118/70 (25 Oct 2018 05:04) (113/73 - 150/76)  RR: 18 (25 Oct 2018 05:04) (18 - 20)  SpO2: 95% (25 Oct 2018 05:04) (95% - 100%)    GENERAL APPEARANCE: NAD, laying comfortably on hospital bed  CARDIAC: Normal S1 and S2. No S3, S4 or murmurs. Rhythm is regular.  LUNGS: Clear to auscultation and percussion without rales, rhonchi, wheezing or diminished breath sounds.  ABDOMEN: Positive bowel sounds. Soft, nondistended, nontender. No guarding or rebound.   EXTREMITIES:  Peripheral pulses 2+. No varicosities.  SKIN: Skin normal color, texture and turgor with no lesions or eruptions.  PSYCHIATRIC: AOx3.Normal affect and behavior.    Labs:                   10.7   4.7   )-----------( 194      ( 25 Oct 2018 04:11 )             30.6     10-25  139  |  109<H>  |  16  ----------------------------<  99  4.9   |  21<L>  |  1.29    Ca    8.2<L>      25 Oct 2018 04:11  Phos  2.7     10-25  Mg     2.0     10-25    Consultant(s) Notes Reviewed: Gastroenterology, Cardiology    MEDICATIONS  (STANDING):  finasteride 5 milliGRAM(s) Oral daily  gabapentin 200 milliGRAM(s) Oral two times a day  pantoprazole  Injectable 40 milliGRAM(s) IV Push two times a day  ranolazine 500 milliGRAM(s) Oral two times a day  sodium chloride 0.9%. 1000 milliLiter(s) (100 mL/Hr) IV Continuous <Continuous>  tamsulosin 0.4 milliGRAM(s) Oral at bedtime    MEDICATIONS  (PRN):  acetaminophen   Tablet .. 650 milliGRAM(s) Oral every 6 hours PRN Mild Pain (1 - 3), Moderate Pain (4 - 6), Severe Pain (7 - 10)

## 2018-10-25 NOTE — DISCHARGE NOTE ADULT - MEDICATION SUMMARY - MEDICATIONS TO STOP TAKING
I will STOP taking the medications listed below when I get home from the hospital:    ibuprofen 200 mg oral tablet  -- 1 tab(s) by mouth 2 times a day

## 2018-10-25 NOTE — DISCHARGE NOTE ADULT - PROVIDER TOKENS
FREE:[LAST:[Lisker, MD],FIRST:[Jace],PHONE:[phone]],FAX:[(   )    -],ADDRESS:[[address] [place] [address]]],FREE:[LAST:[MD Raoul],FIRST:[Freddie Villalobos],PHONE:[phone]:[(   )    -],ADDRESS:[[facility] - Dept of Medicine/Gastroenterology [address] [place] [address]]] FREE:[LAST:[Lisker, MD],FIRST:[Jace],PHONE:[phone]],FAX:[(   )    -],ADDRESS:[[address] [place] [address]]],FREE:[LAST:[MD Raoul],FIRST:[Freddie Villalobos],PHONE:[phone]],FAX:[(   )    -],ADDRESS:[[facility] - Dept of Medicine/Gastroenterology [address] [place] [address]],TOKEN:'[id number]'

## 2018-10-25 NOTE — PROGRESS NOTE ADULT - PROBLEM SELECTOR PLAN 5
-c/w ASA given stent history, will continue to hold Plavix as stents >10yrs ago  -c/w home Ranexa 500mg PO BID  -Consider starting lipitor 20mg qd per cards - c/w ASA given stent history, will continue to hold Plavix as stents >10yrs ago  - c/w home Ranexa 500mg PO BID  - will initiate Lipitor 20mg daily per cards

## 2018-10-25 NOTE — DISCHARGE NOTE ADULT - CARE PROVIDER_API CALL
Lisker, MD, [place]  [address] [facility] [address], [place] [address]  Phone: [phone]  Fax: (   )    -    MD Raoul, Freddie Villalobos  [facility] - Dept of Medicine/Gastroenterology [address] [place] [address]  Phone: [phone]  Fax: (   )    - Lisker, MD, [place]  [address] [facility] [address], [place] [address]  Phone: [phone]  Fax: (   )    -    MD Raoul, Freddie Villalobos  [facility] - Dept of Medicine/Gastroenterology [address] [place] [address]  Phone: [phone]  Fax: (   )    -    [place]), Hematology; Internal Medicine; Oncology  [address] [address]  Phone: [phone]  Fax: [phone]

## 2018-10-25 NOTE — DISCHARGE NOTE ADULT - MEDICATION SUMMARY - MEDICATIONS TO TAKE
I will START or STAY ON the medications listed below when I get home from the hospital:    Avodart 0.5 mg oral capsule  -- 1 cap(s) by mouth once a day  -- Indication: For BPH (benign prostatic hyperplasia)    Aspirin Enteric Coated 81 mg oral delayed release tablet  -- 1 tab(s) by mouth once a day  -- Indication: For Coronary artery disease involving native coronary artery of native heart without angina pectoris    Tylenol 500 mg oral tablet  -- 2 tab(s) by mouth 2 times a day  -- Indication: For Sciatica, unspecified laterality    Flomax 0.4 mg oral capsule  -- 1 cap(s) by mouth once a day  -- Indication: For BPH (benign prostatic hyperplasia)    Ranexa 500 mg oral tablet, extended release  -- 1 tab(s) by mouth 2 times a day  -- Indication: For Coronary artery disease involving native coronary artery of native heart without angina pectoris    gabapentin 100 mg oral capsule  -- 2 cap(s) by mouth 2 times a day  -- Indication: For Sciatica, unspecified laterality    atorvastatin 20 mg oral tablet  -- 1 tab(s) by mouth once a day (at bedtime)  -- Indication: For Coronary artery disease involving native coronary artery of native heart without angina pectoris    Flonase 50 mcg/inh nasal spray  -- 1 spray(s) in each affected nostril , As Needed  -- Indication: For Asthma    Protonix 40 mg oral delayed release tablet  -- 1 tab(s) by mouth once a day  -- Indication: For GIB (gastrointestinal bleeding)    multivitamin  -- 1 tab(s) by mouth once a day  -- Indication: For Nutritional Supplement    Vitamin D3 2000 intl units oral capsule  -- 1 cap(s) by mouth once a day  -- Indication: For Nutritional Supplement    Vitamin B12 1000 mcg oral tablet  -- 1 tab(s) by mouth once a day  -- Indication: For Nutritional Supplement

## 2018-10-25 NOTE — DISCHARGE NOTE ADULT - ADDITIONAL INSTRUCTIONS
Follow up with your gastroenterologist within 1 month.  Follow up with your cardiologist within one week of discharge.

## 2018-10-25 NOTE — PROGRESS NOTE ADULT - PROBLEM SELECTOR PLAN 3
-in the setting of CKD and likely UGIB; currently down trending  -EKG without changes  -trend BMP - in the setting of CKD and likely UGIB; currently down trending  - EKG without changes  - monitor BMP

## 2018-10-25 NOTE — PROGRESS NOTE ADULT - SUBJECTIVE AND OBJECTIVE BOX
Pre-Endoscopy Evaluation      Referring Physician: dr. alexandr liz                              Procedure:   upper gastrointestinal endoscopy     Indication for Procedure: gib    Pertinent History: 88y male with CAD s/p CABG, stents yrs ago (asa, plavix discontinued 3 dys ago), LULU lung infection in the past, history of small bowel resection for bezor, p/w gi bleed; hgb/hct stable    Sedation by Anesthesia [X]    PAST MEDICAL & SURGICAL HISTORY:  Gastric motility disorder  Small bowel obstruction due to adhesions  Angina of effort  Hyperlipidemia, unspecified hyperlipidemia type  GERD (gastroesophageal reflux disease)  Hx of benign prostatic hypertrophy  Essential hypertension, benign  CAD (coronary artery disease) of bypass graft  CAD S/P percutaneous coronary angioplasty  S/P CABG x 2  S/P small bowel resection: with subsequent lysis of adhesisions      PMH of Gastroparesis [ ]  Gastric Surgery [ ]  Gastric Outlet Obstruction [ ]    Allergies:    Cipro (Rash)  penicillins (Unknown)    Intolerances      Latex allergy: [ ] yes [X] no    Medications:MEDICATIONS  (STANDING):  atorvastatin 20 milliGRAM(s) Oral at bedtime  finasteride 5 milliGRAM(s) Oral daily  gabapentin 200 milliGRAM(s) Oral two times a day  pantoprazole Infusion 8 mG/Hr (10 mL/Hr) IV Continuous <Continuous>  ranolazine 500 milliGRAM(s) Oral two times a day  sodium chloride 0.9%. 1000 milliLiter(s) (100 mL/Hr) IV Continuous <Continuous>  tamsulosin 0.4 milliGRAM(s) Oral at bedtime    MEDICATIONS  (PRN):  acetaminophen   Tablet .. 650 milliGRAM(s) Oral every 6 hours PRN Mild Pain (1 - 3), Moderate Pain (4 - 6), Severe Pain (7 - 10)      Smoking: [ ] yes  [X] no    AICD/PPM: [ ] yes   [X] no    Pertinent lab data:                        10.9   4.7   )-----------( 209      ( 25 Oct 2018 10:50 )             32.1     10-25    139  |  109<H>  |  16  ----------------------------<  99  4.9   |  21<L>  |  1.29    Ca    8.2<L>      25 Oct 2018 04:11  Phos  2.7     10-25  Mg     2.0     10-25    TPro  5.9<L>  /  Alb  3.4  /  TBili  0.4  /  DBili  x   /  AST  25  /  ALT  14  /  AlkPhos  81  10-24    PT/INR - ( 24 Oct 2018 08:36 )   PT: 11.2 sec;   INR: 1.04 ratio      PTT - ( 24 Oct 2018 08:36 )  PTT:28.8 sec        Physical Examination:     Daily   Vital Signs Last 24 Hrs  T(C): 36.7 (25 Oct 2018 11:09), Max: 36.9 (24 Oct 2018 12:33)  T(F): 98 (25 Oct 2018 11:09), Max: 98.5 (24 Oct 2018 12:33)  HR: 70 (25 Oct 2018 11:09) (64 - 83)  BP: 144/80 (25 Oct 2018 11:09) (113/73 - 150/76)  BP(mean): --  RR: 18 (25 Oct 2018 11:09) (18 - 18)  SpO2: 98% (25 Oct 2018 11:09) (95% - 100%)    Drug Dosing Weight  Height (cm): 167.64 (24 Oct 2018 07:40)  Weight (kg): 54 (25 Oct 2018 11:09)  BMI (kg/m2): 19.2 (25 Oct 2018 11:09)  BSA (m2): 1.6 (25 Oct 2018 11:09)    Constitutional: NAD     Neck:  No JVD    Respiratory: CTAB/L    Cardiovascular: S1 and S2    Gastrointestinal: BS+, soft, NT/ND    Extremities: No peripheral edema    Neurological: A/O x 3    : No Lyon    Skin: No rashes    Comments:    ASA Class: I [ ]  II [ ]  III [X]  IV [ ]    The patient is a suitable candidate for the planned procedure unless box checked [ ]  No, explain:

## 2018-10-25 NOTE — PROGRESS NOTE ADULT - PROBLEM SELECTOR PLAN 8
-DVT PPX: SCDs in the setting of bleeding  -DIET: NPO after midnight; pending Endoscopy  -DISPO: PT Eval pending medical work-up  -CODE STATUS: Full Code - DVT PPX: SCDs in the setting of bleeding  - DIET: NPO after midnight; pending Endoscopy  - DISPO: PT Eval pending medical work-up  - CODE STATUS: Full Code

## 2018-10-29 RX ORDER — IBUPROFEN 200 MG
1 TABLET ORAL
Qty: 0 | Refills: 0 | COMMUNITY

## 2018-11-02 ENCOUNTER — APPOINTMENT (OUTPATIENT)
Dept: INTERNAL MEDICINE | Facility: CLINIC | Age: 83
End: 2018-11-02
Payer: MEDICARE

## 2018-11-02 ENCOUNTER — LABORATORY RESULT (OUTPATIENT)
Age: 83
End: 2018-11-02

## 2018-11-02 VITALS
WEIGHT: 130 LBS | HEIGHT: 65 IN | DIASTOLIC BLOOD PRESSURE: 77 MMHG | BODY MASS INDEX: 21.66 KG/M2 | HEART RATE: 64 BPM | SYSTOLIC BLOOD PRESSURE: 134 MMHG

## 2018-11-02 DIAGNOSIS — I21.9 ACUTE MYOCARDIAL INFARCTION, UNSPECIFIED: ICD-10-CM

## 2018-11-02 DIAGNOSIS — E53.8 DEFICIENCY OF OTHER SPECIFIED B GROUP VITAMINS: ICD-10-CM

## 2018-11-02 PROCEDURE — 99214 OFFICE O/P EST MOD 30 MIN: CPT | Mod: 25

## 2018-11-02 PROCEDURE — 36415 COLL VENOUS BLD VENIPUNCTURE: CPT

## 2018-11-02 RX ORDER — CEPHALEXIN 500 MG/1
500 CAPSULE ORAL 3 TIMES DAILY
Qty: 21 | Refills: 0 | Status: DISCONTINUED | COMMUNITY
Start: 2018-08-28 | End: 2018-11-02

## 2018-11-02 RX ORDER — GABAPENTIN 100 MG/1
100 CAPSULE ORAL
Qty: 240 | Refills: 0 | Status: DISCONTINUED | COMMUNITY
Start: 2018-01-02 | End: 2018-11-02

## 2018-11-02 RX ORDER — CELECOXIB 100 MG/1
100 CAPSULE ORAL
Qty: 30 | Refills: 0 | Status: DISCONTINUED | COMMUNITY
Start: 2016-12-30 | End: 2018-11-02

## 2018-11-02 RX ORDER — CLOPIDOGREL BISULFATE 75 MG/1
75 TABLET, FILM COATED ORAL
Refills: 0 | Status: DISCONTINUED | COMMUNITY
Start: 2018-08-02 | End: 2018-11-02

## 2018-11-02 NOTE — ASSESSMENT
[FreeTextEntry1] : gi bleed: will check labs\par \par hld: will check lipid panel\par \par heart attack history: notify if any s/s CP or go to ED \par \par routine labs

## 2018-11-02 NOTE — PHYSICAL EXAM
[No Acute Distress] : no acute distress [Well Nourished] : well nourished [Well Developed] : well developed [Well-Appearing] : well-appearing [No Respiratory Distress] : no respiratory distress  [Clear to Auscultation] : lungs were clear to auscultation bilaterally [No Accessory Muscle Use] : no accessory muscle use [Normal Rate] : normal rate  [Regular Rhythm] : with a regular rhythm [Normal S1, S2] : normal S1 and S2 [No Murmur] : no murmur heard [Soft] : abdomen soft [Non Tender] : non-tender [Non-distended] : non-distended [No Masses] : no abdominal mass palpated [No HSM] : no HSM [Normal Affect] : the affect was normal [Normal Insight/Judgement] : insight and judgment were intact

## 2018-11-02 NOTE — HISTORY OF PRESENT ILLNESS
[de-identified] : 88 year old male here today for a follow up from the hospital. He was in the hospital over a week ago.  He states he had a GI bleed. He had a duodenitis.  He was in [facility]. He was discharged 10/25. He does not have any more black stools.  He feels well.  \par \par He states he has a lot of aches and pains, he is on Tylenol.  He was taking Advil which is what they think caused the bleed. He is now on gabapentin and Tylenol but doing okay with that. \par They did an endoscopy in the hospital. \par \par He has had a heart attack in the past.  He denies CP, SOB.  It was in 2000. \par He has a couple of stents. \par \par He states he had B12 deficiency in the past.  \par \par \par \par

## 2018-11-02 NOTE — HEALTH RISK ASSESSMENT
[Intercurrent hospitalizations] : was admitted to the hospital  [No falls in past year] : Patient reported no falls in the past year

## 2018-11-09 ENCOUNTER — OTHER (OUTPATIENT)
Age: 83
End: 2018-11-09

## 2018-11-09 LAB
ALBUMIN SERPL ELPH-MCNC: 3.5 G/DL
ALP BLD-CCNC: 78 U/L
ALT SERPL-CCNC: 14 U/L
ANION GAP SERPL CALC-SCNC: 10 MMOL/L
AST SERPL-CCNC: 16 U/L
BASOPHILS # BLD AUTO: 0.04 K/UL
BASOPHILS NFR BLD AUTO: 0.7 %
BILIRUB SERPL-MCNC: 0.4 MG/DL
BUN SERPL-MCNC: 22 MG/DL
CALCIUM SERPL-MCNC: 9.2 MG/DL
CHLORIDE SERPL-SCNC: 107 MMOL/L
CHOLEST SERPL-MCNC: 85 MG/DL
CHOLEST/HDLC SERPL: 2.5 RATIO
CO2 SERPL-SCNC: 24 MMOL/L
CREAT SERPL-MCNC: 1.41 MG/DL
EOSINOPHIL # BLD AUTO: 0.16 K/UL
EOSINOPHIL NFR BLD AUTO: 2.7 %
GLUCOSE SERPL-MCNC: 113 MG/DL
HCT VFR BLD CALC: 33.5 %
HDLC SERPL-MCNC: 34 MG/DL
HGB BLD-MCNC: 10.8 G/DL
IMM GRANULOCYTES NFR BLD AUTO: 0.2 %
LDLC SERPL CALC-MCNC: 9 MG/DL
LYMPHOCYTES # BLD AUTO: 1.19 K/UL
LYMPHOCYTES NFR BLD AUTO: 20.2 %
MAN DIFF?: NORMAL
MCHC RBC-ENTMCNC: 30.9 PG
MCHC RBC-ENTMCNC: 32.2 GM/DL
MCV RBC AUTO: 95.7 FL
MONOCYTES # BLD AUTO: 0.52 K/UL
MONOCYTES NFR BLD AUTO: 8.8 %
NEUTROPHILS # BLD AUTO: 3.98 K/UL
NEUTROPHILS NFR BLD AUTO: 67.4 %
PLATELET # BLD AUTO: 226 K/UL
POTASSIUM SERPL-SCNC: 4.9 MMOL/L
PROT SERPL-MCNC: 6 G/DL
RBC # BLD: 3.5 M/UL
RBC # FLD: 14.7 %
SODIUM SERPL-SCNC: 141 MMOL/L
TRIGL SERPL-MCNC: 212 MG/DL
VIT B12 SERPL-MCNC: 1184 PG/ML
WBC # FLD AUTO: 5.9 K/UL

## 2018-11-17 ENCOUNTER — INPATIENT (INPATIENT)
Facility: HOSPITAL | Age: 83
LOS: 1 days | Discharge: HOME | DRG: 389 | End: 2018-11-19
Attending: SURGERY | Admitting: SURGERY
Payer: COMMERCIAL

## 2018-11-17 VITALS
HEART RATE: 73 BPM | DIASTOLIC BLOOD PRESSURE: 87 MMHG | RESPIRATION RATE: 16 BRPM | OXYGEN SATURATION: 97 % | TEMPERATURE: 98 F | SYSTOLIC BLOOD PRESSURE: 155 MMHG

## 2018-11-17 DIAGNOSIS — Z95.1 PRESENCE OF AORTOCORONARY BYPASS GRAFT: Chronic | ICD-10-CM

## 2018-11-17 PROCEDURE — 99285 EMERGENCY DEPT VISIT HI MDM: CPT | Mod: GC,25

## 2018-11-17 PROCEDURE — 93010 ELECTROCARDIOGRAM REPORT: CPT

## 2018-11-18 DIAGNOSIS — K56.609 UNSPECIFIED INTESTINAL OBSTRUCTION, UNSPECIFIED AS TO PARTIAL VERSUS COMPLETE OBSTRUCTION: ICD-10-CM

## 2018-11-18 LAB
ALBUMIN SERPL ELPH-MCNC: 3.9 G/DL — SIGNIFICANT CHANGE UP (ref 3.3–5)
ALP SERPL-CCNC: 86 U/L — SIGNIFICANT CHANGE UP (ref 40–120)
ALT FLD-CCNC: 10 U/L — SIGNIFICANT CHANGE UP (ref 10–45)
ANION GAP SERPL CALC-SCNC: 15 MMOL/L — SIGNIFICANT CHANGE UP (ref 5–17)
APTT BLD: 30.1 SEC — SIGNIFICANT CHANGE UP (ref 27.5–36.3)
AST SERPL-CCNC: 16 U/L — SIGNIFICANT CHANGE UP (ref 10–40)
BASE EXCESS BLDV CALC-SCNC: 3.1 MMOL/L — HIGH (ref -2–2)
BASOPHILS # BLD AUTO: 0 K/UL — SIGNIFICANT CHANGE UP (ref 0–0.2)
BASOPHILS NFR BLD AUTO: 0.7 % — SIGNIFICANT CHANGE UP (ref 0–2)
BILIRUB SERPL-MCNC: 0.4 MG/DL — SIGNIFICANT CHANGE UP (ref 0.2–1.2)
BLD GP AB SCN SERPL QL: NEGATIVE — SIGNIFICANT CHANGE UP
BUN SERPL-MCNC: 18 MG/DL — SIGNIFICANT CHANGE UP (ref 7–23)
CA-I SERPL-SCNC: 1.12 MMOL/L — SIGNIFICANT CHANGE UP (ref 1.12–1.3)
CALCIUM SERPL-MCNC: 9 MG/DL — SIGNIFICANT CHANGE UP (ref 8.4–10.5)
CHLORIDE BLDV-SCNC: 109 MMOL/L — HIGH (ref 96–108)
CHLORIDE SERPL-SCNC: 107 MMOL/L — SIGNIFICANT CHANGE UP (ref 96–108)
CO2 BLDV-SCNC: 26 MMOL/L — SIGNIFICANT CHANGE UP (ref 22–30)
CO2 SERPL-SCNC: 22 MMOL/L — SIGNIFICANT CHANGE UP (ref 22–31)
CREAT SERPL-MCNC: 1.37 MG/DL — HIGH (ref 0.5–1.3)
EOSINOPHIL # BLD AUTO: 0.2 K/UL — SIGNIFICANT CHANGE UP (ref 0–0.5)
EOSINOPHIL NFR BLD AUTO: 2.7 % — SIGNIFICANT CHANGE UP (ref 0–6)
GAS PNL BLDV: 137 MMOL/L — SIGNIFICANT CHANGE UP (ref 136–145)
GAS PNL BLDV: SIGNIFICANT CHANGE UP
GAS PNL BLDV: SIGNIFICANT CHANGE UP
GLUCOSE BLDV-MCNC: 100 MG/DL — HIGH (ref 70–99)
GLUCOSE SERPL-MCNC: 104 MG/DL — HIGH (ref 70–99)
HCO3 BLDV-SCNC: 26 MMOL/L — SIGNIFICANT CHANGE UP (ref 21–29)
HCT VFR BLD CALC: 36.4 % — LOW (ref 39–50)
HCT VFR BLDA CALC: 39 % — SIGNIFICANT CHANGE UP (ref 39–50)
HGB BLD CALC-MCNC: 12.7 G/DL — LOW (ref 13–17)
HGB BLD-MCNC: 12.4 G/DL — LOW (ref 13–17)
INR BLD: 1 RATIO — SIGNIFICANT CHANGE UP (ref 0.88–1.16)
LACTATE BLDV-MCNC: 2.3 MMOL/L — HIGH (ref 0.7–2)
LIDOCAIN IGE QN: 18 U/L — SIGNIFICANT CHANGE UP (ref 7–60)
LYMPHOCYTES # BLD AUTO: 1.6 K/UL — SIGNIFICANT CHANGE UP (ref 1–3.3)
LYMPHOCYTES # BLD AUTO: 22.1 % — SIGNIFICANT CHANGE UP (ref 13–44)
MCHC RBC-ENTMCNC: 31.5 PG — SIGNIFICANT CHANGE UP (ref 27–34)
MCHC RBC-ENTMCNC: 34 GM/DL — SIGNIFICANT CHANGE UP (ref 32–36)
MCV RBC AUTO: 92.6 FL — SIGNIFICANT CHANGE UP (ref 80–100)
MONOCYTES # BLD AUTO: 0.6 K/UL — SIGNIFICANT CHANGE UP (ref 0–0.9)
MONOCYTES NFR BLD AUTO: 8.3 % — SIGNIFICANT CHANGE UP (ref 2–14)
NEUTROPHILS # BLD AUTO: 4.9 K/UL — SIGNIFICANT CHANGE UP (ref 1.8–7.4)
NEUTROPHILS NFR BLD AUTO: 66.3 % — SIGNIFICANT CHANGE UP (ref 43–77)
PCO2 BLDV: 33 MMHG — LOW (ref 35–50)
PH BLDV: 7.5 — HIGH (ref 7.35–7.45)
PLATELET # BLD AUTO: 255 K/UL — SIGNIFICANT CHANGE UP (ref 150–400)
PO2 BLDV: <50 MMHG — LOW (ref 25–45)
POTASSIUM BLDV-SCNC: 4.7 MMOL/L — SIGNIFICANT CHANGE UP (ref 3.5–5.3)
POTASSIUM SERPL-MCNC: 4.2 MMOL/L — SIGNIFICANT CHANGE UP (ref 3.5–5.3)
POTASSIUM SERPL-SCNC: 4.2 MMOL/L — SIGNIFICANT CHANGE UP (ref 3.5–5.3)
PROT SERPL-MCNC: 6.3 G/DL — SIGNIFICANT CHANGE UP (ref 6–8.3)
PROTHROM AB SERPL-ACNC: 11.4 SEC — SIGNIFICANT CHANGE UP (ref 10–12.9)
RBC # BLD: 3.93 M/UL — LOW (ref 4.2–5.8)
RBC # FLD: 13.3 % — SIGNIFICANT CHANGE UP (ref 10.3–14.5)
RH IG SCN BLD-IMP: POSITIVE — SIGNIFICANT CHANGE UP
SAO2 % BLDV: 19 % — LOW (ref 67–88)
SODIUM SERPL-SCNC: 144 MMOL/L — SIGNIFICANT CHANGE UP (ref 135–145)
WBC # BLD: 7.4 K/UL — SIGNIFICANT CHANGE UP (ref 3.8–10.5)
WBC # FLD AUTO: 7.4 K/UL — SIGNIFICANT CHANGE UP (ref 3.8–10.5)

## 2018-11-18 PROCEDURE — 74177 CT ABD & PELVIS W/CONTRAST: CPT | Mod: 26

## 2018-11-18 RX ORDER — ONDANSETRON 8 MG/1
4 TABLET, FILM COATED ORAL ONCE
Qty: 0 | Refills: 0 | Status: COMPLETED | OUTPATIENT
Start: 2018-11-18 | End: 2018-11-18

## 2018-11-18 RX ORDER — ATORVASTATIN CALCIUM 80 MG/1
20 TABLET, FILM COATED ORAL AT BEDTIME
Qty: 0 | Refills: 0 | Status: DISCONTINUED | OUTPATIENT
Start: 2018-11-18 | End: 2018-11-19

## 2018-11-18 RX ORDER — RANOLAZINE 500 MG/1
500 TABLET, FILM COATED, EXTENDED RELEASE ORAL
Qty: 0 | Refills: 0 | Status: DISCONTINUED | OUTPATIENT
Start: 2018-11-18 | End: 2018-11-19

## 2018-11-18 RX ORDER — MORPHINE SULFATE 50 MG/1
4 CAPSULE, EXTENDED RELEASE ORAL ONCE
Qty: 0 | Refills: 0 | Status: DISCONTINUED | OUTPATIENT
Start: 2018-11-18 | End: 2018-11-18

## 2018-11-18 RX ORDER — TAMSULOSIN HYDROCHLORIDE 0.4 MG/1
0.4 CAPSULE ORAL AT BEDTIME
Qty: 0 | Refills: 0 | Status: DISCONTINUED | OUTPATIENT
Start: 2018-11-18 | End: 2018-11-19

## 2018-11-18 RX ORDER — FINASTERIDE 5 MG/1
5 TABLET, FILM COATED ORAL DAILY
Qty: 0 | Refills: 0 | Status: DISCONTINUED | OUTPATIENT
Start: 2018-11-18 | End: 2018-11-19

## 2018-11-18 RX ORDER — ACETAMINOPHEN 500 MG
1000 TABLET ORAL ONCE
Qty: 0 | Refills: 0 | Status: COMPLETED | OUTPATIENT
Start: 2018-11-18 | End: 2018-11-18

## 2018-11-18 RX ORDER — HEPARIN SODIUM 5000 [USP'U]/ML
5000 INJECTION INTRAVENOUS; SUBCUTANEOUS EVERY 8 HOURS
Qty: 0 | Refills: 0 | Status: DISCONTINUED | OUTPATIENT
Start: 2018-11-18 | End: 2018-11-19

## 2018-11-18 RX ORDER — ASPIRIN/CALCIUM CARB/MAGNESIUM 324 MG
81 TABLET ORAL DAILY
Qty: 0 | Refills: 0 | Status: DISCONTINUED | OUTPATIENT
Start: 2018-11-18 | End: 2018-11-19

## 2018-11-18 RX ORDER — FLUTICASONE PROPIONATE 50 MCG
1 SPRAY, SUSPENSION NASAL DAILY
Qty: 0 | Refills: 0 | Status: DISCONTINUED | OUTPATIENT
Start: 2018-11-18 | End: 2018-11-19

## 2018-11-18 RX ORDER — SODIUM CHLORIDE 9 MG/ML
1000 INJECTION, SOLUTION INTRAVENOUS
Qty: 0 | Refills: 0 | Status: DISCONTINUED | OUTPATIENT
Start: 2018-11-18 | End: 2018-11-19

## 2018-11-18 RX ORDER — SODIUM CHLORIDE 9 MG/ML
1000 INJECTION, SOLUTION INTRAVENOUS ONCE
Qty: 0 | Refills: 0 | Status: COMPLETED | OUTPATIENT
Start: 2018-11-18 | End: 2018-11-18

## 2018-11-18 RX ORDER — GABAPENTIN 400 MG/1
200 CAPSULE ORAL EVERY 12 HOURS
Qty: 0 | Refills: 0 | Status: DISCONTINUED | OUTPATIENT
Start: 2018-11-18 | End: 2018-11-19

## 2018-11-18 RX ORDER — PANTOPRAZOLE SODIUM 20 MG/1
40 TABLET, DELAYED RELEASE ORAL
Qty: 0 | Refills: 0 | Status: DISCONTINUED | OUTPATIENT
Start: 2018-11-18 | End: 2018-11-19

## 2018-11-18 RX ADMIN — Medication 1000 MILLIGRAM(S): at 03:50

## 2018-11-18 RX ADMIN — ONDANSETRON 4 MILLIGRAM(S): 8 TABLET, FILM COATED ORAL at 00:40

## 2018-11-18 RX ADMIN — TAMSULOSIN HYDROCHLORIDE 0.4 MILLIGRAM(S): 0.4 CAPSULE ORAL at 21:20

## 2018-11-18 RX ADMIN — MORPHINE SULFATE 4 MILLIGRAM(S): 50 CAPSULE, EXTENDED RELEASE ORAL at 01:05

## 2018-11-18 RX ADMIN — HEPARIN SODIUM 5000 UNIT(S): 5000 INJECTION INTRAVENOUS; SUBCUTANEOUS at 21:20

## 2018-11-18 RX ADMIN — RANOLAZINE 500 MILLIGRAM(S): 500 TABLET, FILM COATED, EXTENDED RELEASE ORAL at 18:28

## 2018-11-18 RX ADMIN — FINASTERIDE 5 MILLIGRAM(S): 5 TABLET, FILM COATED ORAL at 12:44

## 2018-11-18 RX ADMIN — Medication 1000 MILLIGRAM(S): at 03:18

## 2018-11-18 RX ADMIN — MORPHINE SULFATE 4 MILLIGRAM(S): 50 CAPSULE, EXTENDED RELEASE ORAL at 02:30

## 2018-11-18 RX ADMIN — MORPHINE SULFATE 4 MILLIGRAM(S): 50 CAPSULE, EXTENDED RELEASE ORAL at 01:11

## 2018-11-18 RX ADMIN — Medication 400 MILLIGRAM(S): at 03:00

## 2018-11-18 RX ADMIN — SODIUM CHLORIDE 1000 MILLILITER(S): 9 INJECTION, SOLUTION INTRAVENOUS at 02:00

## 2018-11-18 RX ADMIN — SODIUM CHLORIDE 75 MILLILITER(S): 9 INJECTION, SOLUTION INTRAVENOUS at 10:09

## 2018-11-18 RX ADMIN — ATORVASTATIN CALCIUM 20 MILLIGRAM(S): 80 TABLET, FILM COATED ORAL at 21:20

## 2018-11-18 RX ADMIN — SODIUM CHLORIDE 1000 MILLILITER(S): 9 INJECTION, SOLUTION INTRAVENOUS at 00:39

## 2018-11-18 RX ADMIN — MORPHINE SULFATE 4 MILLIGRAM(S): 50 CAPSULE, EXTENDED RELEASE ORAL at 00:40

## 2018-11-18 RX ADMIN — HEPARIN SODIUM 5000 UNIT(S): 5000 INJECTION INTRAVENOUS; SUBCUTANEOUS at 15:38

## 2018-11-18 RX ADMIN — Medication 400 MILLIGRAM(S): at 10:09

## 2018-11-18 NOTE — ED PROVIDER NOTE - OBJECTIVE STATEMENT
88M hx of CAD s/p stent x 2, CABG, SBOs and previous surgery for bezoar removal 20 years ago, c/o abdominal distension, pain and nausea since 8PM, notes normal stools prior, last BM was yesterday/ small amt this afternoon, has not passed flatus since 8pm, no fevers/ chills/ dysuria/ back pain/ chest pain or sob, notes similar symptoms w/ previous sbos.

## 2018-11-18 NOTE — ED PROVIDER NOTE - PHYSICAL EXAMINATION
Head: NCAT  ENT: Airway patent, moist mucous membranes, nasal passageways clear, no pharyngeal erythema or exudates, uvula midline, no cervical lymphadenopathy  Cardiac: Normal rate, normal rhythm, no murmurs/rubs/gallops appreciated  Respiratory: Lungs CTA B/L  Gastrointestinal: +BS, Abdomen soft, +TTP LLQ, LUQ, and epigastrium , + mild, distended, no rebound, no guarding  MSK: No gross abnormalities, FROM of all four extremities, no edema  HEME: Extremities warm, pulses intact and symmetrical in all four extremities  Skin: No rashes, no lesions  Neuro: No gross neurologic deficits, CN II-XII intact, no facial asymmetry, no dysarthria, dysdiadochokinesia, strength equal in all four extremities, no gait abnormality Gen: elderly male, NAD, AAOx3   Head: NCAT  ENT: Airway patent, dry mucous membranes, nasal passageways clear, no cervical lymphadenopathy, no JVD   Cardiac: Normal rate, normal rhythm, no murmurs   Respiratory: Lungs CTA B/L  Gastrointestinal: +BS, Abdomen soft, +TTP LLQ, LUQ, and epigastrium , + mild, distended, no rebound, no guarding  MSK: No gross abnormalities, FROM of all four extremities, no edema  HEME: Extremities warm, pulses intact and symmetrical in all four extremities  Skin: No rashes, no lesions  Neuro: No gross neurologic deficits

## 2018-11-18 NOTE — H&P ADULT - ASSESSMENT
Patient is an 88M with recurrent SBOs, presenting with abdominal pain and nausea.  Although CT read states SBO, reviewed prior CT scans 1 year ago and bowel looks the same.  Patients symptoms resolved after he drank PO contrast.  No longer having pain, nausea.  - Diet, CLD  - will give IVF for dehydration  - monitor GIF, will d/c when passes gas  - advised patient to chew food well, drink a lot of water with meals  - patient seen and examined with Dr. Vernon Lizarraga PGY2  [id number]

## 2018-11-18 NOTE — ED PROVIDER NOTE - MEDICAL DECISION MAKING DETAILS
rule out sbo 88 M w/ abd pain concerning for sbo, distending w/ nausea, will obtain ct/ labs, reassess

## 2018-11-18 NOTE — CONSULT NOTE ADULT - ASSESSMENT
Patient is an 88M with recurrent SBOs, presenting with abdominal pain and nausea.  Although CT read states SBO, reviewed prior CT scans 1 year ago and bowel looks the same.  Patients symptoms resolved after he drank PO contrast.  No longer having pain, nausea.  - can give patient clears  - if tolerates, can go home  - advised patient to chew food well, drink a lot of water with meals  - patient seen and examined with Dr. Vernon Lizarraga PGY2  [phone]

## 2018-11-18 NOTE — H&P ADULT - HISTORY OF PRESENT ILLNESS
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 Pain began after he had a dinner of burger, baked potato, and sauteed onions.  The pain was located in the mid abdomen and was associated with nausea, no emesis.  He last passed flatus and BMs before the pain started yesterday afternoon.  He has had many SBOs in the past and the pain felt similar, so he decided to come in. Takes ASA and Plavix for history of cardiac stents x 3 and CABG. Previously underwent bilateral inguinal hernia repair with Dr. Linda.  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

## 2018-11-18 NOTE — CONSULT NOTE ADULT - SUBJECTIVE AND OBJECTIVE BOX
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 Pain began after he had a dinner of burger, baked potato, and sauteed onions.  The pain was located in the mid abdomen and was associated with nausea, no emesis.  He last passed flatus and BMs before the pain started yesterday afternoon.  He has had many SBOs in the past and the pain felt similar, so he decided to come in. Takes ASA and Plavix for history of cardiac stents x 3 and CABG. Previously underwent bilateral inguinal hernia repair with Dr. Linda.  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    PMHx: Gastric motility disorder  Small bowel obstruction due to adhesions  Angina of effort  Hyperlipidemia, unspecified hyperlipidemia type  GERD (gastroesophageal reflux disease)  S/P small bowel resection  Acid reflux  Hx of benign prostatic hypertrophy  Essential hypertension, benign  CAD (coronary artery disease) of bypass graft  CAD S/P percutaneous coronary angioplasty    PSHx: S/P CABG x 2  S/P small bowel resection  Acid reflux    Allergies: Cipro (Rash)  Social Hx: Lives alone, family in [place]   Family Hx: No pertinent family history in first degree relatives      Physical Exam  T(C): 36.7  HR: 78 (72 - 78)  BP: 165/86 (155/87 - 178/98)  RR: 17 (16 - 17)  SpO2: 100% (97% - 100%)  Tmax: T(C): , Max: 36.7 (11-17-18 @ 23:13)    General: well developed, well nourished, NAD  Neuro: alert and oriented, no focal deficits, moves all extremities spontaneously  HEENT: NCAT, EOMI, anicteric, mucosa moist  Respiratory: airway patent, respirations unlabored  CVS: regular rate and rhythm  Abdomen: soft, nontender, nondistended  Extremities: no edema, sensation and movement grossly intact  Skin: warm, dry, appropriate color    Labs:                        12.4   7.4   )-----------( 255      ( 18 Nov 2018 00:49 )             36.4     PT/INR - ( 18 Nov 2018 00:49 )   PT: 11.4 sec;   INR: 1.00 ratio         PTT - ( 18 Nov 2018 00:49 )  PTT:30.1 sec  11-18    144  |  107  |  18  ----------------------------<  104<H>  4.2   |  22  |  1.37<H>    Ca    9.0      18 Nov 2018 00:49    TPro  6.3  /  Alb  3.9  /  TBili  0.4  /  DBili  x   /  AST  16  /  ALT  10  /  AlkPhos  86  11-18            Imaging and other studies:  < from: CT Abdomen and Pelvis w/ Oral Cont and w/ IV Cont (11.18.18 @ 02:53) >  Impression:   -Low grade small bowel obstruction with gradual transition in the left   lower quadrant.  -Moderate enteritis involving a loop jejunum.      < end of copied text >

## 2018-11-18 NOTE — ED PROVIDER NOTE - ATTENDING CONTRIBUTION TO CARE
Patient is an 89 yo M with history of GERD, HTN, hyperlipidemia, CAD s/p CABG, hs of Bezoar surgery and subsequent multiple SBO's secondary to adhesions here for abdominal distention and pain since 8 pm today. Patient states last SBO was 2 months ago, medically managed. He states he had dinner which consisted of a burger, onions and within an hour, started to have pain and nausea that felt like his SBO pain. No vomiting, fevers. Reports LBM this afternoon. Normally his stools are runny, brown. No blood. Denies constipation. He states he is not passing flatus.     VS noted  Gen. no acute distress, Non toxic   HEENT: EOMI, mmm  Lungs: CTAB/L no C/ W /R   CVS: RRR   Abd; Soft distended, ttp in upper abd - epigastric, LUQ, no rebound or guarding  Ext: no edema, no calf pain  Skin: no rash  Neuro AAOx3 non focal clear speech  a/p: abdominal pain and distention, hx of multiple SBO's - will get labs, CT A/P, treat accordingly. Will likely require NG tube and admission based on history and exam.   - Annabelle RAMIREZ

## 2018-11-18 NOTE — H&P ADULT - NSHPLABSRESULTS_GEN_ALL_CORE
12.4   7.4   )-----------( 255      ( 18 Nov 2018 00:49 )             36.4     PT/INR - ( 18 Nov 2018 00:49 )   PT: 11.4 sec;   INR: 1.00 ratio         PTT - ( 18 Nov 2018 00:49 )  PTT:30.1 sec  11-18    144  |  107  |  18  ----------------------------<  104<H>  4.2   |  22  |  1.37<H>    Ca    9.0      18 Nov 2018 00:49    TPro  6.3  /  Alb  3.9  /  TBili  0.4  /  DBili  x   /  AST  16  /  ALT  10  /  AlkPhos  86  11-18            Imaging and other studies:  < from: CT Abdomen and Pelvis w/ Oral Cont and w/ IV Cont (11.18.18 @ 02:53) >  Impression:   -Low grade small bowel obstruction with gradual transition in the left   lower quadrant.  -Moderate enteritis involving a loop jejunum.      < end of copied text >

## 2018-11-18 NOTE — ED ADULT NURSE NOTE - NSIMPLEMENTINTERV_GEN_ALL_ED
Implemented All Fall with Harm Risk Interventions:  [place] to call system. Call bell, personal items and telephone within reach. Instruct patient to call for assistance. Room bathroom lighting operational. Non-slip footwear when patient is off stretcher. Physically safe environment: no spills, clutter or unnecessary equipment. Stretcher in lowest position, wheels locked, appropriate side rails in place. Provide visual cue, wrist band, yellow gown, etc. Monitor gait and stability. Monitor for mental status changes and reorient to person, place, and time. Review medications for side effects contributing to fall risk. Reinforce activity limits and safety measures with patient and family. Provide visual clues: red socks.

## 2018-11-18 NOTE — ED PROVIDER NOTE - NS ED ROS FT
CONST: no fevers, no chills  EYES: no pain, vision loss/dipoplia/decreased vision  ENT: no sore throat, no cough  CV: no chest pain, no palpitations  RESP: no shortness of breath  ABD: + abdominal pain, +nausea, no vomiting  : no dysuria, increased frequency, or hematuria  MSK: no back pain  NEURO: no headache or additional neurologic complaints  HEME: no easy bleeding  SKIN:  no rash

## 2018-11-19 ENCOUNTER — TRANSCRIPTION ENCOUNTER (OUTPATIENT)
Age: 83
End: 2018-11-19

## 2018-11-19 VITALS — WEIGHT: 129.19 LBS

## 2018-11-19 DIAGNOSIS — Z79.899 OTHER LONG TERM (CURRENT) DRUG THERAPY: ICD-10-CM

## 2018-11-19 DIAGNOSIS — N40.0 BENIGN PROSTATIC HYPERPLASIA WITHOUT LOWER URINARY TRACT SYMPTOMS: ICD-10-CM

## 2018-11-19 DIAGNOSIS — K21.9 GASTRO-ESOPHAGEAL REFLUX DISEASE WITHOUT ESOPHAGITIS: ICD-10-CM

## 2018-11-19 DIAGNOSIS — K56.609 UNSPECIFIED INTESTINAL OBSTRUCTION, UNSPECIFIED AS TO PARTIAL VERSUS COMPLETE OBSTRUCTION: ICD-10-CM

## 2018-11-19 DIAGNOSIS — I25.810 ATHEROSCLEROSIS OF CORONARY ARTERY BYPASS GRAFT(S) WITHOUT ANGINA PECTORIS: ICD-10-CM

## 2018-11-19 LAB
ANION GAP SERPL CALC-SCNC: 11 MMOL/L — SIGNIFICANT CHANGE UP (ref 5–17)
BUN SERPL-MCNC: 12 MG/DL — SIGNIFICANT CHANGE UP (ref 7–23)
CALCIUM SERPL-MCNC: 8.2 MG/DL — LOW (ref 8.4–10.5)
CHLORIDE SERPL-SCNC: 104 MMOL/L — SIGNIFICANT CHANGE UP (ref 96–108)
CO2 SERPL-SCNC: 24 MMOL/L — SIGNIFICANT CHANGE UP (ref 22–31)
CREAT SERPL-MCNC: 1.33 MG/DL — HIGH (ref 0.5–1.3)
GLUCOSE SERPL-MCNC: 86 MG/DL — SIGNIFICANT CHANGE UP (ref 70–99)
HCT VFR BLD CALC: 31.4 % — LOW (ref 39–50)
HGB BLD-MCNC: 10.2 G/DL — LOW (ref 13–17)
MAGNESIUM SERPL-MCNC: 1.8 MG/DL — SIGNIFICANT CHANGE UP (ref 1.6–2.6)
MCHC RBC-ENTMCNC: 30.4 PG — SIGNIFICANT CHANGE UP (ref 27–34)
MCHC RBC-ENTMCNC: 32.5 GM/DL — SIGNIFICANT CHANGE UP (ref 32–36)
MCV RBC AUTO: 93.7 FL — SIGNIFICANT CHANGE UP (ref 80–100)
PHOSPHATE SERPL-MCNC: 2.8 MG/DL — SIGNIFICANT CHANGE UP (ref 2.5–4.5)
PLATELET # BLD AUTO: 180 K/UL — SIGNIFICANT CHANGE UP (ref 150–400)
POTASSIUM SERPL-MCNC: 4.1 MMOL/L — SIGNIFICANT CHANGE UP (ref 3.5–5.3)
POTASSIUM SERPL-SCNC: 4.1 MMOL/L — SIGNIFICANT CHANGE UP (ref 3.5–5.3)
RBC # BLD: 3.35 M/UL — LOW (ref 4.2–5.8)
RBC # FLD: 14.3 % — SIGNIFICANT CHANGE UP (ref 10.3–14.5)
SODIUM SERPL-SCNC: 139 MMOL/L — SIGNIFICANT CHANGE UP (ref 135–145)
WBC # BLD: 5.16 K/UL — SIGNIFICANT CHANGE UP (ref 3.8–10.5)
WBC # FLD AUTO: 5.16 K/UL — SIGNIFICANT CHANGE UP (ref 3.8–10.5)

## 2018-11-19 PROCEDURE — 74177 CT ABD & PELVIS W/CONTRAST: CPT

## 2018-11-19 PROCEDURE — 82435 ASSAY OF BLOOD CHLORIDE: CPT

## 2018-11-19 PROCEDURE — 82803 BLOOD GASES ANY COMBINATION: CPT

## 2018-11-19 PROCEDURE — 85610 PROTHROMBIN TIME: CPT

## 2018-11-19 PROCEDURE — 83690 ASSAY OF LIPASE: CPT

## 2018-11-19 PROCEDURE — 84295 ASSAY OF SERUM SODIUM: CPT

## 2018-11-19 PROCEDURE — 84100 ASSAY OF PHOSPHORUS: CPT

## 2018-11-19 PROCEDURE — 82330 ASSAY OF CALCIUM: CPT

## 2018-11-19 PROCEDURE — 99232 SBSQ HOSP IP/OBS MODERATE 35: CPT

## 2018-11-19 PROCEDURE — 86850 RBC ANTIBODY SCREEN: CPT

## 2018-11-19 PROCEDURE — 96375 TX/PRO/DX INJ NEW DRUG ADDON: CPT

## 2018-11-19 PROCEDURE — 80053 COMPREHEN METABOLIC PANEL: CPT

## 2018-11-19 PROCEDURE — 83735 ASSAY OF MAGNESIUM: CPT

## 2018-11-19 PROCEDURE — 82947 ASSAY GLUCOSE BLOOD QUANT: CPT

## 2018-11-19 PROCEDURE — 85027 COMPLETE CBC AUTOMATED: CPT

## 2018-11-19 PROCEDURE — 96365 THER/PROPH/DIAG IV INF INIT: CPT | Mod: XU

## 2018-11-19 PROCEDURE — 96376 TX/PRO/DX INJ SAME DRUG ADON: CPT

## 2018-11-19 PROCEDURE — 86901 BLOOD TYPING SEROLOGIC RH(D): CPT

## 2018-11-19 PROCEDURE — 80048 BASIC METABOLIC PNL TOTAL CA: CPT

## 2018-11-19 PROCEDURE — 99223 1ST HOSP IP/OBS HIGH 75: CPT

## 2018-11-19 PROCEDURE — 96361 HYDRATE IV INFUSION ADD-ON: CPT

## 2018-11-19 PROCEDURE — 85730 THROMBOPLASTIN TIME PARTIAL: CPT

## 2018-11-19 PROCEDURE — 85014 HEMATOCRIT: CPT

## 2018-11-19 PROCEDURE — 43752 NASAL/OROGASTRIC W/TUBE PLMT: CPT

## 2018-11-19 PROCEDURE — 84132 ASSAY OF SERUM POTASSIUM: CPT

## 2018-11-19 PROCEDURE — 86900 BLOOD TYPING SEROLOGIC ABO: CPT

## 2018-11-19 PROCEDURE — 83605 ASSAY OF LACTIC ACID: CPT

## 2018-11-19 PROCEDURE — 99285 EMERGENCY DEPT VISIT HI MDM: CPT | Mod: 25

## 2018-11-19 PROCEDURE — 93005 ELECTROCARDIOGRAM TRACING: CPT

## 2018-11-19 RX ADMIN — Medication 81 MILLIGRAM(S): at 12:23

## 2018-11-19 RX ADMIN — HEPARIN SODIUM 5000 UNIT(S): 5000 INJECTION INTRAVENOUS; SUBCUTANEOUS at 05:09

## 2018-11-19 RX ADMIN — GABAPENTIN 200 MILLIGRAM(S): 400 CAPSULE ORAL at 05:09

## 2018-11-19 RX ADMIN — PANTOPRAZOLE SODIUM 40 MILLIGRAM(S): 20 TABLET, DELAYED RELEASE ORAL at 05:08

## 2018-11-19 RX ADMIN — FINASTERIDE 5 MILLIGRAM(S): 5 TABLET, FILM COATED ORAL at 12:23

## 2018-11-19 RX ADMIN — RANOLAZINE 500 MILLIGRAM(S): 500 TABLET, FILM COATED, EXTENDED RELEASE ORAL at 05:08

## 2018-11-19 NOTE — DISCHARGE NOTE ADULT - NS AS ACTIVITY OBS
Do not drive or operate machinery/Not while taking pain medication/Do not make important decisions/No Heavy lifting/straining/Showering allowed

## 2018-11-19 NOTE — DISCHARGE NOTE ADULT - SECONDARY DIAGNOSIS.
Gastroesophageal reflux disease, esophagitis presence not specified Essential hypertension, benign Hyperlipidemia, unspecified hyperlipidemia type

## 2018-11-19 NOTE — PROGRESS NOTE ADULT - ATTENDING COMMENTS
seen and examined 11-19-18 @ 1145    tolerating clears  +flatus/BM    soft / NT / ND    recurrent SBO  -D/C home if he tolerates full liquid diet

## 2018-11-19 NOTE — DISCHARGE NOTE ADULT - CARE PLAN
Principal Discharge DX:	SBO (small bowel obstruction)  Goal:	Resolution of Symptoms  Assessment and plan of treatment:	Please follow up with Dr Junior only if needed at [address] [place] [address] [address] [address] , phone #[phone].  Secondary Diagnosis:	Essential hypertension, benign  Secondary Diagnosis:	Hyperlipidemia, unspecified hyperlipidemia type  Secondary Diagnosis:	Gastroesophageal reflux disease, esophagitis presence not specified

## 2018-11-19 NOTE — CONSULT NOTE ADULT - SUBJECTIVE AND OBJECTIVE BOX
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 Pain began after he had a dinner of burger, baked potato, and sauteed onions.  The pain was located in the mid abdomen and was associated with nausea, no emesis.  He last passed flatus and BMs before the pain started yesterday afternoon.  He has had many SBOs in the past and the pain felt similar, so he decided to come in. Takes ASA and Plavix for history of cardiac stents x 3 and CABG. Previously underwent bilateral inguinal hernia repair with Dr. Linda.  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 (18 Nov 2018 07:07)    SUBJECTIVE:   Overnight no acute events.  denies cp, sob, abd pain, n/v/f/chills.  states NGT fell out overnight.  ate clears today and tolerated well.    Home Medications:  Aspirin Enteric Coated 81 mg oral delayed release tablet: 1 tab(s) orally once a day (19 Nov 2018 13:38)  Avodart 0.5 mg oral capsule: 1 cap(s) orally once a day (19 Nov 2018 13:38)  Flonase 50 mcg/inh nasal spray: 1 spray(s) in each affected nostril , As Needed (19 Nov 2018 13:38)  gabapentin 100 mg oral capsule: 2 cap(s) orally 2 times a day (19 Nov 2018 13:38)  gabapentin 300 mg oral tablet: 2 tab(s) orally 3 times a day  on top of gabapentin 200 mg BID (19 Nov 2018 13:38)  multivitamin: 1 tab(s) orally once a day (19 Nov 2018 13:38)  Ranexa 500 mg oral tablet, extended release: 1 tab(s) orally 2 times a day (19 Nov 2018 13:38)  Tylenol 500 mg oral tablet: 2 tab(s) orally 2 times a day (19 Nov 2018 13:38)  Vitamin B12 1000 mcg oral tablet: 1 tab(s) orally once a day (19 Nov 2018 13:38)  Vitamin D3 2000 intl units oral capsule: 1 cap(s) orally once a day (19 Nov 2018 13:38)    PAST MEDICAL & SURGICAL HISTORY:  Gastric motility disorder  Small bowel obstruction due to adhesions  Angina of effort  Hyperlipidemia, unspecified hyperlipidemia type  GERD (gastroesophageal reflux disease)  Hx of benign prostatic hypertrophy  Essential hypertension, benign  CAD (coronary artery disease) of bypass graft  CAD S/P percutaneous coronary angioplasty  S/P CABG x 2  S/P small bowel resection: with subsequent lysis of adhesisions    Review of Systems:   CONSTITUTIONAL: No fever, weight loss, or fatigue  EYES: No eye pain, visual disturbances, or discharge  ENMT:  No difficulty hearing, tinnitus, vertigo; No sinus or throat pain  NECK: No pain or stiffness  BREASTS: No pain, masses, or nipple discharge  RESPIRATORY: No cough, wheezing, chills or hemoptysis; No shortness of breath  CARDIOVASCULAR: No chest pain, palpitations, dizziness, or leg swelling  GASTROINTESTINAL: No abdominal or epigastric pain. No nausea, vomiting, or hematemesis; No diarrhea or constipation. No melena or hematochezia.  GENITOURINARY: No dysuria, frequency, hematuria, or incontinence  NEUROLOGICAL: No headaches, memory loss, loss of strength, numbness, or tremors  SKIN: No itching, burning, rashes, or lesions   LYMPH NODES: No enlarged glands  ENDOCRINE: No heat or cold intolerance; No hair loss  MUSCULOSKELETAL: No joint pain or swelling; No muscle, back, or extremity pain  HEME/LYMPH: No easy bruising, or bleeding gums  ALLERY AND IMMUNOLOGIC: No hives or eczema    Allergies    Cipro (Rash)  penicillins (Unknown)    Intolerances    Social History:   denies smoking, etoh.    FAMILY HISTORY:  No pertinent family history in first degree relatives  denies fam hx of SBO.    Vital Signs Last 24 Hrs  T(C): 37.3 (19 Nov 2018 09:00), Max: 37.3 (18 Nov 2018 18:06)  T(F): 99.1 (19 Nov 2018 09:00), Max: 99.2 (18 Nov 2018 18:06)  HR: 71 (19 Nov 2018 09:00) (69 - 71)  BP: 107/63 (19 Nov 2018 09:00) (107/63 - 144/75)  BP(mean): --  RR: 18 (19 Nov 2018 09:00) (18 - 18)  SpO2: 96% (19 Nov 2018 09:00) (94% - 96%)  CAPILLARY BLOOD GLUCOSE    PHYSICAL EXAM:  GENERAL: NAD, well-developed  HEAD:  NCAT  EYES: EOMI  NECK: Supple, No JVD  CHEST/LUNG: Clear to auscultation bilaterally; No wheeze  HEART: Reg rate. No M/R/G  ABDOMEN: SNTND, well healed prior abd scar  EXTREMITIES:  2+ Peripheral Pulses, No clubbing, cyanosis, or edema  PSYCH: AAOx3    LABS:                        10.2   5.16  )-----------( 180      ( 19 Nov 2018 07:41 )             31.4     11-19    139  |  104  |  12  ----------------------------<  86  4.1   |  24  |  1.33<H>    Ca    8.2<L>      19 Nov 2018 06:22  Phos  2.8     11-19  Mg     1.8     11-19    TPro  6.3  /  Alb  3.9  /  TBili  0.4  /  DBili  x   /  AST  16  /  ALT  10  /  AlkPhos  86  11-18    PT/INR - ( 18 Nov 2018 00:49 )   PT: 11.4 sec;   INR: 1.00 ratio         PTT - ( 18 Nov 2018 00:49 )  PTT:30.1 sec    MEDICATIONS  (STANDING):  aspirin enteric coated 81 milliGRAM(s) Oral daily  atorvastatin 20 milliGRAM(s) Oral at bedtime  finasteride 5 milliGRAM(s) Oral daily  fluticasone propionate 50 MICROgram(s)/spray Nasal Spray 1 Spray(s) Both Nostrils daily  gabapentin 200 milliGRAM(s) Oral every 12 hours  heparin  Injectable 5000 Unit(s) SubCutaneous every 8 hours  pantoprazole    Tablet 40 milliGRAM(s) Oral before breakfast  ranolazine 500 milliGRAM(s) Oral two times a day  tamsulosin 0.4 milliGRAM(s) Oral at bedtime

## 2018-11-19 NOTE — CONSULT NOTE ADULT - ASSESSMENT
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

## 2018-11-19 NOTE — DISCHARGE NOTE ADULT - OTHER SIGNIFICANT FINDINGS
EXAM:  CT ABDOMEN AND PELVIS OC IC                          PROCEDURE DATE:  11/18/2018        INTERPRETATION:  Clinical Information: Abdominal pain.    Comparison: 07/23/2018    Technique: Contrast enhanced  CT abdomen and pelvis with IV and oral contrast. 100 cc of Omnipaque 350 utilized.  Axial reconstructions.     Findings:    Imaged chest: Multiple small nodules and architectural distortion of the lung bases.  Hepatobiliary: Liver, biliary tree, gallbladder are unremarkable.  Spleen: Unremarkable.  Pancreas: Unremarkable.  Adrenal glands: Unremarkable.  Urinary: Bilateral renal cysts. Ureters and urinary bladder unremarkable.   Reproductive organs: Enlarged prostate.    Gastrointestinal: Low-grade partial small bowel obstruction with gradual transition at the left lower quadrant (series 2, image 77). There is mild bowel wall thickening involving a loop of jejunum with associated mesenteric inflammation representing enteritis. There is a small bowel-small bowel anastomosis. No pneumatosis.    Peritoneum: No free air, ascites, or abscess.  Vasculature: Mild atherosclerosis. No portal venous gas.  Retroperitoneum: Unremarkable.  Subcutaneous tissues: Unremarkable.  Neuromusculoskeletal: Degenerative disc disease.    Impression:   -Low grade small bowel obstruction with gradual transition in the left lower quadrant.  -Moderate enteritis involving a loop jejunum.

## 2018-11-19 NOTE — DISCHARGE NOTE ADULT - HOSPITAL COURSE
88M with PMHx of CAD S/P STENTS X 2,CABG X 2,SBO,HLD,GERD,BPH AND BEZOAR X 20 YRS AGO  with recurrent SBOs, presenting with abdominal pain and nausea.  Although CT read states SBO, reviewed prior CT scans 1 year ago and bowel looks the same.  Patients symptoms resolved after he drank PO contrast.  No longer having pain, nausea.  NGT was placed, however it fell out that evening. Patient was having GI fxn and diet was advanced to Full Liquids. After discussion with Dr Junior patient was clear for discharge home with full liquid diet and to advance slowly as tolerated at home.

## 2018-11-19 NOTE — DISCHARGE NOTE ADULT - CARE PROVIDER_API CALL
Richie Junior), Surgery; Surgical Critical Care  [address] [id number]  Phone: [phone]  Fax: [phone]

## 2018-11-19 NOTE — CONSULT NOTE ADULT - PROBLEM SELECTOR RECOMMENDATION 9
recurrent, low grade per CT scan, now tolerating clears and NGT out  - advance diet as tolerates  - serial abd exams  - monitoring electrolytes

## 2018-11-19 NOTE — DISCHARGE NOTE ADULT - PLAN OF CARE
Resolution of Symptoms Please follow up with Dr Junior only if needed at [address] [facility] [address] [address] , phone #[phone].

## 2018-11-19 NOTE — DISCHARGE NOTE ADULT - MEDICATION SUMMARY - MEDICATIONS TO TAKE
I will START or STAY ON the medications listed below when I get home from the hospital:    Avodart 0.5 mg oral capsule  -- 1 cap(s) by mouth once a day  -- Indication: For Home med    Tylenol 500 mg oral tablet  -- 2 tab(s) by mouth 2 times a day  -- Indication: For pain    Aspirin Enteric Coated 81 mg oral delayed release tablet  -- 1 tab(s) by mouth once a day  -- Indication: For Home med    Flomax 0.4 mg oral capsule  -- 1 cap(s) by mouth once a day  -- Indication: For BPH    Ranexa 500 mg oral tablet, extended release  -- 1 tab(s) by mouth 2 times a day  -- Indication: For Antianginal agent    gabapentin 100 mg oral capsule  -- 2 cap(s) by mouth 2 times a day  -- Indication: For Home med    atorvastatin 20 mg oral tablet  -- 1 tab(s) by mouth once a day (at bedtime)  -- Indication: For HLD    Flonase 50 mcg/inh nasal spray  -- 1 spray(s) in each affected nostril , As Needed  -- Indication: For nasal spray    Protonix 40 mg oral delayed release tablet  -- 1 tab(s) by mouth once a day  -- Indication: For reflux    multivitamin  -- 1 tab(s) by mouth once a day  -- Indication: For Supplement    Vitamin D3 2000 intl units oral capsule  -- 1 cap(s) by mouth once a day  -- Indication: For Supplement    Vitamin B12 1000 mcg oral tablet  -- 1 tab(s) by mouth once a day  -- Indication: For Supplement

## 2018-11-19 NOTE — PROGRESS NOTE ADULT - SUBJECTIVE AND OBJECTIVE BOX
GENERAL SURGERY DAILY PROGRESS NOTE:     Subjective: Patient seen and examined. Patient stable with no overnight events. Patient denies CP/ SOB/ Palpatations. Patient denies N/V. Reports flatus but No BM.     MEDICATIONS  (STANDING):  aspirin enteric coated 81 milliGRAM(s) Oral daily  atorvastatin 20 milliGRAM(s) Oral at bedtime  finasteride 5 milliGRAM(s) Oral daily  fluticasone propionate 50 MICROgram(s)/spray Nasal Spray 1 Spray(s) Both Nostrils daily  gabapentin 200 milliGRAM(s) Oral every 12 hours  heparin  Injectable 5000 Unit(s) SubCutaneous every 8 hours  pantoprazole    Tablet 40 milliGRAM(s) Oral before breakfast  ranolazine 500 milliGRAM(s) Oral two times a day  tamsulosin 0.4 milliGRAM(s) Oral at bedtime    Vital Signs Last 24 Hrs:   T(C): 37.3 (19 Nov 2018 09:00), Max: 37.3 (18 Nov 2018 18:06)  T(F): 99.1 (19 Nov 2018 09:00), Max: 99.2 (18 Nov 2018 18:06)  HR: 71 (19 Nov 2018 09:00) (69 - 76)  BP: 107/63 (19 Nov 2018 09:00) (107/63 - 144/75)  RR: 18 (19 Nov 2018 09:00) (18 - 18)  SpO2: 96% (19 Nov 2018 09:00) (94% - 96%)    I&O's Detail:   18 Nov 2018 07:01  -  19 Nov 2018 07:00  --------------------------------------------------------  IN:    IV PiggyBack: 100 mL    lactated ringers.: 1650 mL  Total IN: 1750 mL    OUT:    Nasoenteral Tube: 550 mL    Voided: 925 mL  Total OUT: 1475 mL    Total NET: 275 mL      19 Nov 2018 07:01  -  19 Nov 2018 11:55  --------------------------------------------------------  IN:    Oral Fluid: 480 mL  Total IN: 480 mL    OUT:  Total OUT: 0 mL  Total NET: 480 mL      LABS:                        10.2   5.16  )-----------( 180      ( 19 Nov 2018 07:41 )             31.4     11-19    139  |  104  |  12  ----------------------------<  86  4.1   |  24  |  1.33<H>    Ca    8.2<L>      19 Nov 2018 06:22  Phos  2.8     11-19  Mg     1.8     11-19    TPro  6.3  /  Alb  3.9  /  TBili  0.4  /  DBili  x   /  AST  16  /  ALT  10  /  AlkPhos  86  11-18    PT/INR - ( 18 Nov 2018 00:49 )   PT: 11.4 sec;   INR: 1.00 ratio         PTT - ( 18 Nov 2018 00:49 )  PTT:30.1 sec      Physical Exam:   Gen: NAD, AAOx3  Abdomen: soft, NT, ND    Assessment:    88y Male who presents with Small bowel obstruction    Plan:  -DVT PPX  -Pain control   -OOB as tolerated with assistance   -Diet as tolerated  -Dispo Planning     Letitia Garcia   #[id number] 11-19-18 @ 11:55

## 2018-11-19 NOTE — CHART NOTE - NSCHARTNOTEFT_GEN_A_CORE
Patient seen at the bedside after nasogastric tube fell out. Endorses passing flatus and had a small bowel movement. Refusing replacement of nasogastric tube. Abdominal exam is soft, nontender, with slight distension. Patient aware that if he should become nauseous and/or vomit or become more distended, NGT would need to be replaced. Will continue to monitor.   Lisa Lisa PA-C   ATP   [id number]

## 2018-11-19 NOTE — DISCHARGE NOTE ADULT - PATIENT PORTAL LINK FT
You can access the [facility], offered by [facility], by registering with the following website: [email or web address]

## 2018-12-11 ENCOUNTER — APPOINTMENT (OUTPATIENT)
Dept: DERMATOLOGY | Facility: CLINIC | Age: 83
End: 2018-12-11
Payer: MEDICARE

## 2018-12-11 VITALS
DIASTOLIC BLOOD PRESSURE: 68 MMHG | BODY MASS INDEX: 21.33 KG/M2 | HEIGHT: 65 IN | SYSTOLIC BLOOD PRESSURE: 130 MMHG | WEIGHT: 128 LBS

## 2018-12-11 DIAGNOSIS — R23.4 CHANGES IN SKIN TEXTURE: ICD-10-CM

## 2018-12-11 PROCEDURE — 17003 DESTRUCT PREMALG LES 2-14: CPT

## 2018-12-11 PROCEDURE — 99213 OFFICE O/P EST LOW 20 MIN: CPT | Mod: 25

## 2018-12-11 PROCEDURE — 17000 DESTRUCT PREMALG LESION: CPT

## 2019-02-16 NOTE — ED ADULT TRIAGE NOTE - PAIN RATING/NUMBER SCALE (0-10): ACTIVITY
Please follow up with GYN on Monday, 0

## 2019-02-21 ENCOUNTER — APPOINTMENT (OUTPATIENT)
Dept: CARDIOLOGY | Facility: CLINIC | Age: 84
End: 2019-02-21
Payer: MEDICARE

## 2019-02-21 ENCOUNTER — NON-APPOINTMENT (OUTPATIENT)
Age: 84
End: 2019-02-21

## 2019-02-21 VITALS
DIASTOLIC BLOOD PRESSURE: 70 MMHG | OXYGEN SATURATION: 98 % | SYSTOLIC BLOOD PRESSURE: 135 MMHG | HEIGHT: 65 IN | WEIGHT: 135 LBS | HEART RATE: 75 BPM | BODY MASS INDEX: 22.49 KG/M2

## 2019-02-21 PROCEDURE — 93000 ELECTROCARDIOGRAM COMPLETE: CPT

## 2019-02-21 PROCEDURE — 99214 OFFICE O/P EST MOD 30 MIN: CPT

## 2019-02-21 NOTE — DISCUSSION/SUMMARY
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 No angina. No ECG changes.\par I encouraged him to stay physically active. [facility] ring for cv data.\par No changes to his anginal mediation.\par labs to see the effects of stain

## 2019-02-21 NOTE — HISTORY OF PRESENT ILLNESS
[FreeTextEntry1] : He has stopped alcohol completely. No further intestinal obstruction\par \par Less exertion, though he goes to the gym regularly. recumbent bicycle no dyspnea.\par No chest pain with exercise.\par  \par \par

## 2019-02-21 NOTE — REVIEW OF SYSTEMS
[see HPI] : see HPI [Shortness Of Breath] : no shortness of breath [Dyspnea on exertion] : not dyspnea during exertion [Lower Ext Edema] : no extremity edema [Palpitations] : no palpitations [Negative] : Heme/Lymph

## 2019-02-21 NOTE — DISCUSSION/SUMMARY
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 No angina. No ECG changes.\par I encouraged him to stay physically active. [place] ring for cv data.\par No changes to his anginal mediation.\par labs to see the effects of stain

## 2019-02-21 NOTE — PHYSICAL EXAM
[General Appearance - Well Developed] : well developed [General Appearance - Well Nourished] : well nourished [General Appearance - In No Acute Distress] : no acute distress [Normal Conjunctiva] : the conjunctiva exhibited no abnormalities [No Oral Pallor] : no oral pallor [Normal Jugular Venous V Waves Present] : normal jugular venous V waves present [Respiration, Rhythm And Depth] : normal respiratory rhythm and effort [Auscultation Breath Sounds / Voice Sounds] : lungs were clear to auscultation bilaterally [Heart Sounds] : normal S1 and S2 [Murmurs] : no murmurs present [Arterial Pulses Normal] : the arterial pulses were normal [Edema] : no peripheral edema present [Regular] : the rhythm was regular [FreeTextEntry1] : 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 [Bowel Sounds] : normal bowel sounds [Abdomen Soft] : soft [Abnormal Walk] : normal gait [Cyanosis, Localized] : no localized cyanosis [Skin Turgor] : normal skin turgor [Oriented To Time, Place, And Person] : oriented to person, place, and time [Impaired Insight] : insight and judgment were intact [Affect] : the affect was normal

## 2019-03-05 LAB
ALBUMIN SERPL ELPH-MCNC: 3.5 G/DL
ALP BLD-CCNC: 73 U/L
ALT SERPL-CCNC: 20 U/L
ANION GAP SERPL CALC-SCNC: 12 MMOL/L
AST SERPL-CCNC: 24 U/L
BASOPHILS # BLD AUTO: 0.05 K/UL
BASOPHILS NFR BLD AUTO: 0.9 %
BILIRUB SERPL-MCNC: 0.2 MG/DL
BUN SERPL-MCNC: 21 MG/DL
CALCIUM SERPL-MCNC: 9 MG/DL
CHLORIDE SERPL-SCNC: 110 MMOL/L
CHOLEST SERPL-MCNC: 102 MG/DL
CHOLEST/HDLC SERPL: 3.4 RATIO
CO2 SERPL-SCNC: 21 MMOL/L
CREAT SERPL-MCNC: 1.27 MG/DL
EOSINOPHIL # BLD AUTO: 0.16 K/UL
EOSINOPHIL NFR BLD AUTO: 2.8 %
GLUCOSE SERPL-MCNC: 92 MG/DL
HCT VFR BLD CALC: 35.4 %
HDLC SERPL-MCNC: 30 MG/DL
HGB BLD-MCNC: 10.5 G/DL
IMM GRANULOCYTES NFR BLD AUTO: 0.2 %
LDLC SERPL CALC-MCNC: 10 MG/DL
LYMPHOCYTES # BLD AUTO: 1.1 K/UL
LYMPHOCYTES NFR BLD AUTO: 19.1 %
MAN DIFF?: NORMAL
MCHC RBC-ENTMCNC: 29.7 GM/DL
MCHC RBC-ENTMCNC: 30.1 PG
MCV RBC AUTO: 101.4 FL
MONOCYTES # BLD AUTO: 0.57 K/UL
MONOCYTES NFR BLD AUTO: 9.9 %
NEUTROPHILS # BLD AUTO: 3.88 K/UL
NEUTROPHILS NFR BLD AUTO: 67.1 %
PLATELET # BLD AUTO: 239 K/UL
POTASSIUM SERPL-SCNC: 5.2 MMOL/L
PROT SERPL-MCNC: 6.1 G/DL
RBC # BLD: 3.49 M/UL
RBC # FLD: 15.2 %
SODIUM SERPL-SCNC: 143 MMOL/L
TRIGL SERPL-MCNC: 310 MG/DL
WBC # FLD AUTO: 5.77 K/UL

## 2019-04-16 ENCOUNTER — INPATIENT (INPATIENT)
Facility: HOSPITAL | Age: 84
LOS: 1 days | Discharge: HOME | DRG: 313 | End: 2019-04-18
Attending: HOSPITALIST | Admitting: HOSPITALIST
Payer: MEDICARE

## 2019-04-16 VITALS
OXYGEN SATURATION: 98 % | HEART RATE: 93 BPM | DIASTOLIC BLOOD PRESSURE: 82 MMHG | HEIGHT: 66 IN | RESPIRATION RATE: 18 BRPM | TEMPERATURE: 98 F | WEIGHT: 128.97 LBS | SYSTOLIC BLOOD PRESSURE: 173 MMHG

## 2019-04-16 DIAGNOSIS — Z95.1 PRESENCE OF AORTOCORONARY BYPASS GRAFT: Chronic | ICD-10-CM

## 2019-04-16 DIAGNOSIS — R07.9 CHEST PAIN, UNSPECIFIED: ICD-10-CM

## 2019-04-16 LAB
APTT BLD: 29.6 SEC — SIGNIFICANT CHANGE UP (ref 27.5–36.3)
BASOPHILS # BLD AUTO: 0 K/UL — SIGNIFICANT CHANGE UP (ref 0–0.2)
BASOPHILS NFR BLD AUTO: 0.1 % — SIGNIFICANT CHANGE UP (ref 0–2)
EOSINOPHIL # BLD AUTO: 0.2 K/UL — SIGNIFICANT CHANGE UP (ref 0–0.5)
EOSINOPHIL NFR BLD AUTO: 4 % — SIGNIFICANT CHANGE UP (ref 0–6)
HCT VFR BLD CALC: 34.1 % — LOW (ref 39–50)
HGB BLD-MCNC: 11.5 G/DL — LOW (ref 13–17)
INR BLD: 1.03 RATIO — SIGNIFICANT CHANGE UP (ref 0.88–1.16)
LYMPHOCYTES # BLD AUTO: 1.1 K/UL — SIGNIFICANT CHANGE UP (ref 1–3.3)
LYMPHOCYTES # BLD AUTO: 18.8 % — SIGNIFICANT CHANGE UP (ref 13–44)
MCHC RBC-ENTMCNC: 31.5 PG — SIGNIFICANT CHANGE UP (ref 27–34)
MCHC RBC-ENTMCNC: 33.8 GM/DL — SIGNIFICANT CHANGE UP (ref 32–36)
MCV RBC AUTO: 93.2 FL — SIGNIFICANT CHANGE UP (ref 80–100)
MONOCYTES # BLD AUTO: 0.7 K/UL — SIGNIFICANT CHANGE UP (ref 0–0.9)
MONOCYTES NFR BLD AUTO: 11.5 % — SIGNIFICANT CHANGE UP (ref 2–14)
NEUTROPHILS # BLD AUTO: 3.7 K/UL — SIGNIFICANT CHANGE UP (ref 1.8–7.4)
NEUTROPHILS NFR BLD AUTO: 65.6 % — SIGNIFICANT CHANGE UP (ref 43–77)
NT-PROBNP SERPL-SCNC: 626 PG/ML — HIGH (ref 0–300)
PLATELET # BLD AUTO: 210 K/UL — SIGNIFICANT CHANGE UP (ref 150–400)
PROTHROM AB SERPL-ACNC: 11.9 SEC — SIGNIFICANT CHANGE UP (ref 10–12.9)
RBC # BLD: 3.66 M/UL — LOW (ref 4.2–5.8)
RBC # FLD: 13.1 % — SIGNIFICANT CHANGE UP (ref 10.3–14.5)
TROPONIN T, HIGH SENSITIVITY RESULT: 18 NG/L — SIGNIFICANT CHANGE UP (ref 0–51)
TROPONIN T, HIGH SENSITIVITY RESULT: 19 NG/L — SIGNIFICANT CHANGE UP (ref 0–51)
WBC # BLD: 5.7 K/UL — SIGNIFICANT CHANGE UP (ref 3.8–10.5)
WBC # FLD AUTO: 5.7 K/UL — SIGNIFICANT CHANGE UP (ref 3.8–10.5)

## 2019-04-16 PROCEDURE — 99285 EMERGENCY DEPT VISIT HI MDM: CPT | Mod: GC,25

## 2019-04-16 PROCEDURE — 99223 1ST HOSP IP/OBS HIGH 75: CPT

## 2019-04-16 PROCEDURE — 71045 X-RAY EXAM CHEST 1 VIEW: CPT | Mod: 26

## 2019-04-16 PROCEDURE — 93010 ELECTROCARDIOGRAM REPORT: CPT

## 2019-04-16 RX ORDER — FLUTICASONE PROPIONATE 50 MCG
1 SPRAY, SUSPENSION NASAL
Qty: 0 | Refills: 0 | COMMUNITY

## 2019-04-16 RX ORDER — ASPIRIN/CALCIUM CARB/MAGNESIUM 324 MG
243 TABLET ORAL ONCE
Qty: 0 | Refills: 0 | Status: COMPLETED | OUTPATIENT
Start: 2019-04-16 | End: 2019-04-16

## 2019-04-16 RX ORDER — PREGABALIN 225 MG/1
1 CAPSULE ORAL
Qty: 0 | Refills: 0 | COMMUNITY

## 2019-04-16 RX ORDER — GABAPENTIN 400 MG/1
2 CAPSULE ORAL
Qty: 0 | Refills: 0 | COMMUNITY

## 2019-04-16 RX ADMIN — Medication 243 MILLIGRAM(S): at 19:08

## 2019-04-16 NOTE — ED PROVIDER NOTE - CHIEF COMPLAINT
Heather Cowan [id number] [facility]. [facility], 1 [facility] Physical Therapy Daily Note Patient Name: Carmenza Sandoval Date:2019 : 2008 [x]  Patient  Verified Payor: BLUE CROSS / Plan: [facility] PPO / Product Type: PPO / In time:0930 AM  Out time:  1130 AM 
Total Treatment Time (min): 120 Total Timed Codes (min): 120 Treatment Area: Abnormality of gait [R26.9] Muscle weakness [M62.81] Visit Type: 
[x] Intensive 
[] Outpatient 
[]  Orthotic Clinic Visit 
[]  Equipment Clinic Visit SUBJECTIVE Pain Level FLACC scale Start of Session  During the Session  End of Session Face  0 0 0 Legs  0 0 0 Activity  0 0 0 Cry  0 0 0 Consolability  0 0 0 Total  0 0 0 Any medication changes, allergies to medications, adverse drug reactions, diagnosis change, or new procedure performed?: [x] No    [] Yes (see summary sheet for update) Subjective functional status/changes:   [] No changes reported Debbie Miller arrived to PT with Mom and sister who were present during the session. Mom reported no new changes with Debbie Miller. OBJECTIVE Modality rationale: decrease pain, increase tissue extensibility and/or increase muscle contraction/control to improve the patients ability to achieve their functional goals Type Additional Details  
[] Estim: []Att    []TENS  []NMES   
 []IFC  []Premod  []Other: 
[]  Concurrent with other treatment  []w/ice   []w/heat Position: Location:  
[]  Ice     []  heat 
[]  Ice massage 
[]  Concurrent with other treatment Position: Location:  
[] Skin assessment post-treatment:  []intact []redness- no adverse reaction 
  []redness  adverse reaction:  
 
80 min Therapeutic Exercise:  [x] See flow sheet Rationale: increase ROM, increase strength, improve coordination, improve balance and increase proprioception to improve the patients ability to The patient is a 89y Male complaining of chest pain. achieve their functional goals 30 min Neuromuscular Re-education:  []  See flow sheet Rationale: Improve muscle re-education of movement, balance, coordination, kinesthetic sense, posture, and proprioception to improve the patient's ability to achieve their functional goals 
 
 min Manual Therapy:  See flowsheet Rationale: decrease pain, increase ROM, increase tissue extensibility, decrease trigger points and increase postural awareness to work towards their functional goals 10 min Gait Training:    
 
 min Therapeutic Activities: See Flowsheet Rationale: to use dynamic activity to improve functional performance and transfers With 
 [] TE 
 [] neuro [x] other: Throughout the session Patient Education: [] Review HEP [] Progressed/Changed HEP based on:  
[] positioning   [] body mechanics   [] transfers   [] heat/ice application 
[x]  Reviewed session with caregiver  
[] other:   
 
 Objective/Functional Measures: Focus Area Activities Completed Reflex Integration/Vestibular Stimulation -  LE Embrace and Squeeze x 3 reps, bilaterally -  Foot Tendon Guard x 3 reps, bilaterally -  LE grounding x 3 reps, bilaterally Strengthening Exercises -  Isolated LE strengthening in the universal exercise unit- see flow sheet for details -  Bridges x 20 with tactile cues at her glutes with occasional Kunal for hip extension -  Worked on tall kneeling walking in the lateral directions with light HHA; provided min-modA at her hips. Completed ~4 feet x 2 reps in each direction -  Worked on tall kneeling walking in forward/reverse direction x ~4 feet x 3 reps in each direction with B HHA and min-modA at her trunk or LEs for weight shifting or reverse movement of her LEs (assistance only provided when in the reverse direction) Balance Activities -  Standing balance while in the Spider Cage with overhead 4 point bungee support; continued to work on stepping forward and backwards on a single alfredo with close guarding-modA at her LEs in the forward direction with mod-maxA for stepping in the reverse direction Transitions -  Half kneel>stand transitions with single  throughout the session. Provided Kunal at her trunk for initiation of LE extension Gait Training -  Overground gait training x ~30 feet with L HHA and min-modA at her trunk for balance x 2 trials -  Gait training x ~10 feet with L HHA and modA at her anterior trunk due to excessive leaning forward. Tall Kneeling -  See strengthening activities ASSESSMENT/Changes in Function: Carmina tolerated all activities well and is making good progress towards her goals. [facility] exhibited improved isolated functional hip abduction strength when side stepping while tall kneeling and required decreased assistance with continued practice. [facility] had increased difficulty initiating reverse movement of her LEs when tall kneel walking backwards. [facility] exhibited improved step initiation over the alfredo with assistance for weight shifting provided at her trunk though more consistently required assistance for stepping backwards over the alfredo. Patient will continue to benefit from skilled PT services to modify and progress therapeutic interventions, address functional mobility deficits, address ROM deficits, address strength deficits, analyze and address soft tissue restrictions, analyze and cue movement patterns, analyze and modify body mechanics/ergonomics, assess and modify postural abnormalities and instruct in home and community integration to attain remaining goals. [x]  See Plan of Care 
[]  See progress note/recertification 
[]  See Discharge Summary Progress towards goals / Updated goals: [x]  Continues to work on goals on a daily basis PLAN [x]  Upgrade activities as tolerated     [x]  Continue plan of care 
[]  Update interventions per flow sheet      
[]  Discharge due to:_ 
 []  Other:_   
 
Azar Suellen, PT 1/18/2019

## 2019-04-16 NOTE — H&P ADULT - ATTENDING COMMENTS
Patient was previously unknown to me. Patient was assigned to me by hospitalist in charge. My involvement in this case consisted of initial history, physical and management plan. Primary medicine Day team to assume care in AM and thereafter. Case discussed in detail with overnight medicine NP/MALLORY Jimenez [phone]
Yes

## 2019-04-16 NOTE — H&P ADULT - NSICDXPASTMEDICALHX_GEN_ALL_CORE_FT
PAST MEDICAL HISTORY:  Angina of effort     CAD (coronary artery disease) of bypass graft     CAD S/P percutaneous coronary angioplasty     Essential hypertension, benign     Gastric motility disorder     GERD (gastroesophageal reflux disease)     Hx of benign prostatic hypertrophy     Hyperlipidemia, unspecified hyperlipidemia type     Small bowel obstruction due to adhesions

## 2019-04-16 NOTE — ED ADULT NURSE NOTE - OBJECTIVE STATEMENT
88 yo M arrived to the ed c/o cp; pt ambulatory in the ed, ekg completed in triage, brought back to the red area, placed on C.M; A&Ox4; pt c/o cp started yesterday, improved with rest; reports continued cp today for 2 hours without relief; no medication taken pta

## 2019-04-16 NOTE — H&P ADULT - NSHPPHYSICALEXAM_GEN_ALL_CORE
Vital Signs Last 24 Hrs  T(C): 36.5 (16 Apr 2019 18:22), Max: 36.5 (16 Apr 2019 18:22)  T(F): 97.7 (16 Apr 2019 18:22), Max: 97.7 (16 Apr 2019 18:22)  HR: 83 (16 Apr 2019 20:13) (83 - 93)  BP: 154/83 (16 Apr 2019 20:13) (154/83 - 173/82)  BP(mean): --  RR: 18 (16 Apr 2019 20:13) (18 - 18)  SpO2: 98% (16 Apr 2019 20:13) (98% - 98%) Vital Signs Last 24 Hrs  T(C): 36.5 (16 Apr 2019 18:22), Max: 36.5 (16 Apr 2019 18:22)  T(F): 97.7 (16 Apr 2019 18:22), Max: 97.7 (16 Apr 2019 18:22)  HR: 83 (16 Apr 2019 20:13) (83 - 93)  BP: 154/83 (16 Apr 2019 20:13) (154/83 - 173/82)  BP(mean): --  RR: 18 (16 Apr 2019 20:13) (18 - 18)  SpO2: 98% (16 Apr 2019 20:13) (98% - 98%)    ON TELE :SR 80s    PHYSICAL EXAM:  GENERAL: NAD, well-groomed, well-developed  HEAD:  Atraumatic, Normocephalic  EYES: EOMI, PERRLA, conjunctiva and sclera clear  ENMT: Moist mucous membranes  NECK: Supple, No JVD  NERVOUS SYSTEM:  Alert & Oriented X3, Good concentration; Motor Strength 5/5 B/L upper and lower extremities  CHEST/LUNG: Clear to percussion bilaterally +S1 S2; No rales, rhonchi, or wheezing  HEART: Regular rate and rhythm +S1 S2; No murmurs, rubs, or gallops  ABDOMEN: Soft, Nontender, Nondistended, No guarding, No rigidity; Bowel sounds present  EXTREMITIES:  2+ Peripheral Pulses, No clubbing, cyanosis, or edema  LYMPH: No lymphadenopathy noted  SKIN: No rashes or lesions

## 2019-04-16 NOTE — H&P ADULT - HISTORY OF PRESENT ILLNESS
89 yo man with PMH of CAD s/p CABG (x2 in 2000) and stent x2 (last stent in 2004) HLD, HTN (remote currently not on medications) presents from home in setting of chest pain. Patient states last episode of chest pain occurred ~5:00 pm. States pain occurred with exertion when he was walking briskly. Pain was substernal and described as pressure. Endorsed associated SOB. Denies radiation. Denies associated palpitations. nausea, emesis, diaphoresis. States discomfort lasted for ~1 hr. Patient states that he had right-sided chest pressure which began last night. States duration was not as long as today's symptoms. Patients states that he had a stress test ~ 1 year ago and was told it was "alright." He did not have any follow up diagnostic testing thereafter.

## 2019-04-16 NOTE — H&P ADULT - PROBLEM SELECTOR PLAN 3
Continue protonix Per patient not currently on anti-HTN  Monitor  Consider addition of medication if persistently elevated

## 2019-04-16 NOTE — ED PROVIDER NOTE - CLINICAL SUMMARY MEDICAL DECISION MAKING FREE TEXT BOX
89yo male with exertional chest pain and shortness of breath, concerning for acute coronary syndrome. Will check labs, cxr, ekg, give ASA, likely admission. Kelly Recinos DO 89yo male with exertional chest pain and shortness of breath, concerning for acute coronary syndrome. Will check labs, cxr, ekg, give ASA, likely admission. Kelly Recinos DO    Attending MD Raza: See Attending Statement

## 2019-04-16 NOTE — ED PROVIDER NOTE - OBJECTIVE STATEMENT
89yo male PMH Coronary Artery Disease s/p CABG and stents, recurrent SBO, gastroesophageal reflux disease, hyperlipidemia, HTN presenting with right-sided chest pain that feels like pressure, started last night and lasted a few minutes. Today pain returned, worse on exertion, a/w shortness of breath. Last stress about 1 year ago reportedly normal. No nausea/vomiting, no diarrhea. Of note, patient feels like he had mild SBO over weekend for which he meditated and had resolution of symptoms. Had diarrhea during this time as well.    PMD: Montana Juarez  Cardiology: Jay Lisker

## 2019-04-16 NOTE — H&P ADULT - NSHPLABSRESULTS_GEN_ALL_CORE
Personally reviewed labs and noted in detail below. Personally reviewed labs and noted in detail below. Trop 18 and 19     Personally reviewed EKG: SR 89 bpm 1st degree HB, QTc 440 unchanged morphology compared to 11/18/2018 EKG    Personally reviewed CXR: unchanged bilateral pleural effusion

## 2019-04-16 NOTE — ED PROVIDER NOTE - ATTENDING CONTRIBUTION TO CARE
Attending MD Raza: I personally have seen and examined this patient.  Resident note reviewed and agree on plan of care and except where noted.  See below for details.     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  Reports last night was hurrying down corridor and developed R sided chest pain, reports slowed down and sat down for a bit and it resolved.  Reports lasted a few minutes.  Reports this afternoon had similar episode, reports hurried down [place] because late for meeting and developed mid chest discomfort with associated shortness of breath.  Denies radiation.  Reports pressure like pain.  Reports worse with exertion, reports had it years ago when he had CABG and stents.  Reports had stress with Dr. Lisker, it was "alright".  Denies abdominal pain, nausea, vomiting, blood in stools. Reports over the weekend had a "mild obstruction", meditated and then passed, had diarrhea as is typical over the weekend, none today.  Reports had rash with a URI (unclear if it was the PCN or the virus, this is distant), gets "fixed eruption" from Cipro.  Reports used to smoke a pipe but over 50 yrs ago.  On exam, NAD, head NCAT, PERRL, FROM at neck, no tenderness to midline palpation, no stepoffs along length of spine, lungs CTAB with good inspiratory effort, +S1S2, no m/r/g, abdomen soft with +BS, NT, ND, no CVAT, well healed lower abdominal midline vertical scar, moving all extremities with 5/5 strength bilateral upper and lower extremities, good and equal  strength bilaterally, no calf tenderness, swelling, erythema or warmth; A/P: 89M with chest pain     TO BE COMPLETED Attending MD Raza: I personally have seen and examined this patient.  Resident note reviewed and agree on plan of care and except where noted.  See below for details.     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  Reports last night was hurrying down corridor and developed R sided chest pain, reports slowed down and sat down for a bit and it resolved.  Reports lasted a few minutes.  Reports this afternoon had similar episode, reports hurried down [place] because late for meeting and developed mid chest discomfort with associated shortness of breath.  Denies radiation.  Reports pressure like pain.  Reports worse with exertion, reports had it years ago when he had CABG and stents.  Reports had stress with Dr. Lisker, it was "alright".  Denies abdominal pain, nausea, vomiting, blood in stools. Reports over the weekend had a "mild obstruction", meditated and then passed, had diarrhea as is typical over the weekend, none today.  Reports had rash with a URI (unclear if it was the PCN or the virus, this is distant), gets "fixed eruption" from Cipro.  Reports used to smoke a pipe but over 50 yrs ago.  On exam, NAD, head NCAT, PERRL, FROM at neck, no tenderness to midline palpation, no stepoffs along length of spine, lungs CTAB with good inspiratory effort, +S1S2, no m/r/g, abdomen soft with +BS, NT, ND, no CVAT, well healed lower abdominal midline vertical scar, moving all extremities with 5/5 strength bilateral upper and lower extremities, good and equal  strength bilaterally, no calf tenderness, swelling, erythema or warmth; A/P: 89M with exertional chest pain, Ddx includes unstable angina,  ?GERD, will obtain EKG, CXR, labs, will give ASA, keep on monitor, likely admit given history

## 2019-04-16 NOTE — ED ADULT NURSE NOTE - NSIMPLEMENTINTERV_GEN_ALL_ED
Implemented All Fall with Harm Risk Interventions:  [place] to call system. Call bell, personal items and telephone within reach. Instruct patient to call for assistance. Room bathroom lighting operational. Non-slip footwear when patient is off stretcher. Physically safe environment: no spills, clutter or unnecessary equipment. Stretcher in lowest position, wheels locked, appropriate side rails in place. Provide visual cue, wrist band, yellow gown, etc. Monitor gait and stability. Monitor for mental status changes and reorient to person, place, and time. Review medications for side effects contributing to fall risk. Reinforce activity limits and safety measures with patient and family. Provide visual clues: red socks.

## 2019-04-16 NOTE — H&P ADULT - NSHPREVIEWOFSYSTEMS_GEN_ALL_CORE
REVIEW OF SYSTEMS:  CONSTITUTIONAL: No fever, chills, or sweats.   EYES: No visual disturbances  ENMT:  No sinus congestion, no rhinorrhea, no sore throat  RESPIRATORY: No cough. See HPI  CARDIOVASCULAR: See HPI  GASTROINTESTINAL: No abdominal pain. No nausea, vomiting, diarrhea or constipation. No melena or hematochezia.  GENITOURINARY: No dysuria or hematuria  NEUROLOGICAL: No headaches, loss of strength, or numbness  SKIN: No rashes or lesions   LYMPH NODES: No enlarged glands  MUSCULOSKELETAL: No joint pain or swelling; No muscle, back, or extremity pain  HEME/LYMPH: No easy bruising, or bleeding gums  ALLERGY AND IMMUNOLOGIC: +reaction to medications

## 2019-04-16 NOTE — H&P ADULT - ASSESSMENT
89 yo man with PMH of CAD s/p CABG (x2 in 2000) and stent x2 (last stent in 2004) HLD, HTN (remote currently not on medications) presents from home in setting of chest pain.

## 2019-04-16 NOTE — H&P ADULT - NSICDXPASTSURGICALHX_GEN_ALL_CORE_FT
PAST SURGICAL HISTORY:  S/P CABG x 2     S/P small bowel resection with subsequent lysis of adhesisions

## 2019-04-16 NOTE — H&P ADULT - PROBLEM SELECTOR PLAN 1
Patient with hx of CAD/CABG/PCI with Stent  Trop unchanged No appreciable EKG changes  Currently asymptomatic  Monitor on tele  Continue Aspirin, Statin, Ranexa  Check Lipid panel, HgbA1x  Consult Lisker/Cardiology in AM with regards to repeat stress testing

## 2019-04-16 NOTE — ED PROVIDER NOTE - PHYSICAL EXAMINATION
Gen: NAD  Head: NCAT  Lung: CTAB, no respiratory distress, no wheezing, rales, rhonchi  CV: normal s1/s2, rrr, no murmurs, Normal perfusion  Abd: soft, NTND  MSK: No edema, no visible deformities, full range of motion in all 4 extremities  Neuro: No focal neurologic deficits  Skin: No rash  Psych: normal affect

## 2019-04-17 ENCOUNTER — TRANSCRIPTION ENCOUNTER (OUTPATIENT)
Age: 84
End: 2019-04-17

## 2019-04-17 DIAGNOSIS — K21.9 GASTRO-ESOPHAGEAL REFLUX DISEASE WITHOUT ESOPHAGITIS: ICD-10-CM

## 2019-04-17 DIAGNOSIS — Z29.9 ENCOUNTER FOR PROPHYLACTIC MEASURES, UNSPECIFIED: ICD-10-CM

## 2019-04-17 DIAGNOSIS — E78.5 HYPERLIPIDEMIA, UNSPECIFIED: ICD-10-CM

## 2019-04-17 DIAGNOSIS — R07.9 CHEST PAIN, UNSPECIFIED: ICD-10-CM

## 2019-04-17 DIAGNOSIS — I10 ESSENTIAL (PRIMARY) HYPERTENSION: ICD-10-CM

## 2019-04-17 DIAGNOSIS — N40.0 BENIGN PROSTATIC HYPERPLASIA WITHOUT LOWER URINARY TRACT SYMPTOMS: ICD-10-CM

## 2019-04-17 LAB
ANION GAP SERPL CALC-SCNC: 11 MMOL/L — SIGNIFICANT CHANGE UP (ref 5–17)
BUN SERPL-MCNC: 15 MG/DL — SIGNIFICANT CHANGE UP (ref 7–23)
CALCIUM SERPL-MCNC: 8.6 MG/DL — SIGNIFICANT CHANGE UP (ref 8.4–10.5)
CHLORIDE SERPL-SCNC: 109 MMOL/L — HIGH (ref 96–108)
CHOLEST SERPL-MCNC: 75 MG/DL — SIGNIFICANT CHANGE UP (ref 10–199)
CO2 SERPL-SCNC: 22 MMOL/L — SIGNIFICANT CHANGE UP (ref 22–31)
CREAT SERPL-MCNC: 1.12 MG/DL — SIGNIFICANT CHANGE UP (ref 0.5–1.3)
GLUCOSE SERPL-MCNC: 82 MG/DL — SIGNIFICANT CHANGE UP (ref 70–99)
HBA1C BLD-MCNC: 5 % — SIGNIFICANT CHANGE UP (ref 4–5.6)
HDLC SERPL-MCNC: 35 MG/DL — LOW
LIPID PNL WITH DIRECT LDL SERPL: 21 MG/DL — SIGNIFICANT CHANGE UP
MAGNESIUM SERPL-MCNC: 1.8 MG/DL — SIGNIFICANT CHANGE UP (ref 1.6–2.6)
PHOSPHATE SERPL-MCNC: 3.1 MG/DL — SIGNIFICANT CHANGE UP (ref 2.5–4.5)
POTASSIUM SERPL-MCNC: 4.4 MMOL/L — SIGNIFICANT CHANGE UP (ref 3.5–5.3)
POTASSIUM SERPL-SCNC: 4.4 MMOL/L — SIGNIFICANT CHANGE UP (ref 3.5–5.3)
SODIUM SERPL-SCNC: 142 MMOL/L — SIGNIFICANT CHANGE UP (ref 135–145)
TOTAL CHOLESTEROL/HDL RATIO MEASUREMENT: 2.1 RATIO — LOW (ref 3.4–9.6)
TRIGL SERPL-MCNC: 95 MG/DL — SIGNIFICANT CHANGE UP (ref 10–149)
TROPONIN T, HIGH SENSITIVITY RESULT: 20 NG/L — SIGNIFICANT CHANGE UP (ref 0–51)

## 2019-04-17 PROCEDURE — 93016 CV STRESS TEST SUPVJ ONLY: CPT

## 2019-04-17 PROCEDURE — 78452 HT MUSCLE IMAGE SPECT MULT: CPT | Mod: 26

## 2019-04-17 PROCEDURE — 99233 SBSQ HOSP IP/OBS HIGH 50: CPT

## 2019-04-17 PROCEDURE — 93018 CV STRESS TEST I&R ONLY: CPT

## 2019-04-17 RX ORDER — ACETAMINOPHEN 500 MG
650 TABLET ORAL EVERY 6 HOURS
Qty: 0 | Refills: 0 | Status: DISCONTINUED | OUTPATIENT
Start: 2019-04-17 | End: 2019-04-18

## 2019-04-17 RX ORDER — RANOLAZINE 500 MG/1
500 TABLET, FILM COATED, EXTENDED RELEASE ORAL
Qty: 0 | Refills: 0 | Status: DISCONTINUED | OUTPATIENT
Start: 2019-04-17 | End: 2019-04-18

## 2019-04-17 RX ORDER — FINASTERIDE 5 MG/1
5 TABLET, FILM COATED ORAL DAILY
Qty: 0 | Refills: 0 | Status: DISCONTINUED | OUTPATIENT
Start: 2019-04-17 | End: 2019-04-18

## 2019-04-17 RX ORDER — ASPIRIN/CALCIUM CARB/MAGNESIUM 324 MG
81 TABLET ORAL DAILY
Qty: 0 | Refills: 0 | Status: DISCONTINUED | OUTPATIENT
Start: 2019-04-17 | End: 2019-04-18

## 2019-04-17 RX ORDER — GABAPENTIN 400 MG/1
300 CAPSULE ORAL DAILY
Qty: 0 | Refills: 0 | Status: DISCONTINUED | OUTPATIENT
Start: 2019-04-17 | End: 2019-04-18

## 2019-04-17 RX ORDER — PANTOPRAZOLE SODIUM 20 MG/1
40 TABLET, DELAYED RELEASE ORAL
Qty: 0 | Refills: 0 | Status: DISCONTINUED | OUTPATIENT
Start: 2019-04-17 | End: 2019-04-18

## 2019-04-17 RX ORDER — ATORVASTATIN CALCIUM 80 MG/1
10 TABLET, FILM COATED ORAL AT BEDTIME
Qty: 0 | Refills: 0 | Status: DISCONTINUED | OUTPATIENT
Start: 2019-04-17 | End: 2019-04-18

## 2019-04-17 RX ADMIN — Medication 650 MILLIGRAM(S): at 06:47

## 2019-04-17 RX ADMIN — Medication 650 MILLIGRAM(S): at 05:51

## 2019-04-17 RX ADMIN — FINASTERIDE 5 MILLIGRAM(S): 5 TABLET, FILM COATED ORAL at 21:23

## 2019-04-17 RX ADMIN — RANOLAZINE 500 MILLIGRAM(S): 500 TABLET, FILM COATED, EXTENDED RELEASE ORAL at 21:23

## 2019-04-17 RX ADMIN — PANTOPRAZOLE SODIUM 40 MILLIGRAM(S): 20 TABLET, DELAYED RELEASE ORAL at 05:51

## 2019-04-17 RX ADMIN — RANOLAZINE 500 MILLIGRAM(S): 500 TABLET, FILM COATED, EXTENDED RELEASE ORAL at 08:12

## 2019-04-17 RX ADMIN — RANOLAZINE 500 MILLIGRAM(S): 500 TABLET, FILM COATED, EXTENDED RELEASE ORAL at 01:07

## 2019-04-17 RX ADMIN — ATORVASTATIN CALCIUM 10 MILLIGRAM(S): 80 TABLET, FILM COATED ORAL at 21:23

## 2019-04-17 RX ADMIN — GABAPENTIN 300 MILLIGRAM(S): 400 CAPSULE ORAL at 08:12

## 2019-04-17 RX ADMIN — Medication 81 MILLIGRAM(S): at 08:13

## 2019-04-17 NOTE — PROGRESS NOTE ADULT - SUBJECTIVE AND OBJECTIVE BOX
Patient is a 89y old  Male who presents with a chief complaint of chest pain (16 Apr 2019 23:25)      SUBJECTIVE / OVERNIGHT EVENTS:  overnight reports recurrent substernal chest pain lasting ~1 hour, occurred when he tried to lie flat and improved with sitting up.  No associated sob/n/v/melena/brbpr/cough.  At home 2 days PTA he had chest pain with ambulation that resolved with rest, and yesterday it happened when rushing to a meeting.  Says not associated with food.  Lives alone.     MEDICATIONS  (STANDING):  aspirin enteric coated 81 milliGRAM(s) Oral daily  atorvastatin 10 milliGRAM(s) Oral at bedtime  finasteride 5 milliGRAM(s) Oral daily  gabapentin 300 milliGRAM(s) Oral daily  pantoprazole    Tablet 40 milliGRAM(s) Oral before breakfast  ranolazine 500 milliGRAM(s) Oral two times a day    MEDICATIONS  (PRN):  acetaminophen   Tablet .. 650 milliGRAM(s) Oral every 6 hours PRN Mild Pain (1 - 3), Moderate Pain (4 - 6)      Vital Signs Last 24 Hrs  T(C): 36.8 (17 Apr 2019 07:16), Max: 36.8 (17 Apr 2019 02:03)  T(F): 98.3 (17 Apr 2019 07:16), Max: 98.3 (17 Apr 2019 07:16)  HR: 57 (17 Apr 2019 07:16) (57 - 93)  BP: 128/69 (17 Apr 2019 07:16) (101/57 - 173/82)  BP(mean): --  RR: 17 (17 Apr 2019 07:16) (17 - 18)  SpO2: 97% (17 Apr 2019 07:16) (97% - 98%)  CAPILLARY BLOOD GLUCOSE        I&O's Summary    17 Apr 2019 07:01  -  17 Apr 2019 11:21  --------------------------------------------------------  IN: 0 mL / OUT: 160 mL / NET: -160 mL        PHYSICAL EXAM:  	GENERAL: NAD, well-groomed, well-developed  	HEAD:  Atraumatic, Normocephalic  	EYES: EOMI, conjunctiva and sclera clear  	ENMT: Moist mucous membranes  	NECK: Supple, No JVD  	NERVOUS SYSTEM:  Alert & Oriented X3, Good concentration; Motor Strength 5/5 B/L upper and lower extremities  	CHEST/LUNG: decreased bs RLL, no wheeze/acc muscles  	HEART: Regular rate and rhythm +S1 S2; No murmurs, rubs, or gallops  	ABDOMEN: Soft, Nontender, Nondistended, No guarding, No rigidity; Bowel sounds present  	EXTREMITIES:  2+ Peripheral Pulses, No clubbing, cyanosis, or edema  	    LABS:                        11.5   5.7   )-----------( 210      ( 16 Apr 2019 18:59 )             34.1     04-17    142  |  109<H>  |  15  ----------------------------<  82  4.4   |  22  |  1.12    Ca    8.6      17 Apr 2019 06:47  Phos  3.1     04-17  Mg     1.8     04-17    TPro  6.0  /  Alb  3.6  /  TBili  0.2  /  DBili  x   /  AST  18  /  ALT  15  /  AlkPhos  77  04-16    PT/INR - ( 16 Apr 2019 18:59 )   PT: 11.9 sec;   INR: 1.03 ratio         PTT - ( 16 Apr 2019 18:59 )  PTT:29.6 sec          RADIOLOGY & ADDITIONAL TESTS:    Imaging Personally Reviewed:  < from: Xray Chest 1 View AP/PA (04.16.19 @ 19:37) >  IMPRESSION:   Unchanged bilateral pleural effusions.     < end of copied text >    Consultant(s) Notes Reviewed:      Care Discussed with Consultants/Other Providers:  Dr Lisker (cardiologist)-plan for inpatient nuclear stress test given his age and lives alone; may pursue tte as outpatient

## 2019-04-17 NOTE — DISCHARGE NOTE PROVIDER - NSDCQMCOGNITION_NEU_ALL_CORE
----- Message from Miriam Abernathy MD sent at 9/10/2018  4:26 PM CDT -----  Tacro level slightly low but labs overall ok and kidney function improved, repeat labs in 4 weeks   No difficulties

## 2019-04-17 NOTE — DISCHARGE NOTE PROVIDER - CARE PROVIDER_API CALL
Lisker, Jay J (MD)  Cardiovascular Disease; Internal Medicine  [address] [facility] [address]  [place] [address]  Phone: [phone]  Fax: [phone]  Follow Up Time:

## 2019-04-17 NOTE — PROGRESS NOTE ADULT - ASSESSMENT
88 yo man with PMH of CAD s/p CABG (x2 in 2000) and stent x2 (last stent in 2004) HLD, HTN (remote currently not on medications) presents from home in setting of chest pain, enzymes negative, awaiting nuclear stress.

## 2019-04-17 NOTE — PROGRESS NOTE ADULT - PROBLEM SELECTOR PLAN 3
Per patient not currently on anti-HTN  Monitor  Consider addition of medication if persistently elevated

## 2019-04-17 NOTE — DISCHARGE NOTE PROVIDER - HOSPITAL COURSE
88 yo man with PMH of CAD s/p CABG (x2 in 2000) and stent x2 (last stent in 2004) HLD, HTN (remote currently not on medications) presents from home with exertional chest pain, enzymes negative, no appreciable EKG changes, underwent nuclear stress testing that showed no significant defect.  Chest pain may be GERD related.  DIscharged home with outpatient follow up.

## 2019-04-17 NOTE — PROVIDER CONTACT NOTE (OTHER) - ASSESSMENT
VSS. 101/67, HR 77, Sat 97% on 3LNC, NSR on tele monitor. Pt asymptomatic denies SOB, chest pain, palpitations, dizziness

## 2019-04-17 NOTE — DISCHARGE NOTE PROVIDER - NSDCCPCAREPLAN_GEN_ALL_CORE_FT
PRINCIPAL DISCHARGE DIAGNOSIS  Diagnosis: Chest pain  Assessment and Plan of Treatment: All test unremarkable, follow up with cardiologist  HOME CARE INSTRUCTIONS  For the next few days, avoid physical activities that bring on chest pain. Continue physical activities as directed.  Do not smoke.  Avoid drinking alcohol.   Only take over-the-counter or prescription medicine for pain, discomfort, or fever as directed by your caregiver.  Keep any follow-up appointments you made. If you do not go to an appointment, you could develop lasting (chronic) problems with pain. If there is any problem keeping an appointment, you must call to reschedule.   SEEK MEDICAL CARE IF:  You think you are having problems from the medicine you are taking. Read your medicine instructions carefully.  Your chest pain does not go away, even after treatment.  You develop a rash with blisters on your chest.  SEEK IMMEDIATE MEDICAL CARE IF:  You have increased chest pain or pain that spreads to your arm, neck, jaw, back, or abdomen.   You develop shortness of breath, an increasing cough, or you are coughing up blood.  You have severe back or abdominal pain, feel nauseous, or vomit.  You develop severe weakness, fainting, or chills.  You have a fever.  THIS IS AN EMERGENCY. Do not wait to see if the pain will go away. Get medical help at once. Call your local emergency services  Do not drive yourself to the hospital.        SECONDARY DISCHARGE DIAGNOSES  Diagnosis: HTN (hypertension)  Assessment and Plan of Treatment: Low salt diet  Activity as tolerated.  Take all medication as prescribed.  Follow up with your medical doctor for routine blood pressure monitoring at your next visit.  Notify your doctor if you have any of the following symptoms:   Dizziness, Lightheadedness, Blurry vision, Headache, Chest pain, Shortness of breath

## 2019-04-17 NOTE — PROGRESS NOTE ADULT - PROBLEM SELECTOR PLAN 1
Patient with hx of CAD/CABG/PCI with Stent  Trop unchanged No appreciable EKG changes  Monitor on tele  Continue Aspirin, Statin, Ranexa  LDL per Lisker is at goal  I spoke with Dr Lisker.  Reasonable to pursue inpatient nuclear stress test given pain recurrence overnight.

## 2019-04-18 ENCOUNTER — INBOUND DOCUMENT (OUTPATIENT)
Age: 84
End: 2019-04-18

## 2019-04-18 ENCOUNTER — OTHER (OUTPATIENT)
Age: 84
End: 2019-04-18

## 2019-04-18 ENCOUNTER — TRANSCRIPTION ENCOUNTER (OUTPATIENT)
Age: 84
End: 2019-04-18

## 2019-04-18 VITALS
DIASTOLIC BLOOD PRESSURE: 68 MMHG | OXYGEN SATURATION: 96 % | SYSTOLIC BLOOD PRESSURE: 114 MMHG | HEART RATE: 59 BPM | RESPIRATION RATE: 18 BRPM | TEMPERATURE: 98 F

## 2019-04-18 PROCEDURE — A9505: CPT

## 2019-04-18 PROCEDURE — 84100 ASSAY OF PHOSPHORUS: CPT

## 2019-04-18 PROCEDURE — 85730 THROMBOPLASTIN TIME PARTIAL: CPT

## 2019-04-18 PROCEDURE — 84484 ASSAY OF TROPONIN QUANT: CPT

## 2019-04-18 PROCEDURE — 99239 HOSP IP/OBS DSCHRG MGMT >30: CPT

## 2019-04-18 PROCEDURE — 78452 HT MUSCLE IMAGE SPECT MULT: CPT

## 2019-04-18 PROCEDURE — 93005 ELECTROCARDIOGRAM TRACING: CPT

## 2019-04-18 PROCEDURE — 71045 X-RAY EXAM CHEST 1 VIEW: CPT

## 2019-04-18 PROCEDURE — 83880 ASSAY OF NATRIURETIC PEPTIDE: CPT

## 2019-04-18 PROCEDURE — 83735 ASSAY OF MAGNESIUM: CPT

## 2019-04-18 PROCEDURE — 80048 BASIC METABOLIC PNL TOTAL CA: CPT

## 2019-04-18 PROCEDURE — 83036 HEMOGLOBIN GLYCOSYLATED A1C: CPT

## 2019-04-18 PROCEDURE — 99285 EMERGENCY DEPT VISIT HI MDM: CPT

## 2019-04-18 PROCEDURE — A9500: CPT

## 2019-04-18 PROCEDURE — 85027 COMPLETE CBC AUTOMATED: CPT

## 2019-04-18 PROCEDURE — 80061 LIPID PANEL: CPT

## 2019-04-18 PROCEDURE — 85610 PROTHROMBIN TIME: CPT

## 2019-04-18 PROCEDURE — 80053 COMPREHEN METABOLIC PANEL: CPT

## 2019-04-18 PROCEDURE — 93017 CV STRESS TEST TRACING ONLY: CPT

## 2019-04-18 RX ADMIN — RANOLAZINE 500 MILLIGRAM(S): 500 TABLET, FILM COATED, EXTENDED RELEASE ORAL at 05:10

## 2019-04-18 RX ADMIN — GABAPENTIN 300 MILLIGRAM(S): 400 CAPSULE ORAL at 05:11

## 2019-04-18 RX ADMIN — Medication 650 MILLIGRAM(S): at 07:44

## 2019-04-18 RX ADMIN — PANTOPRAZOLE SODIUM 40 MILLIGRAM(S): 20 TABLET, DELAYED RELEASE ORAL at 05:10

## 2019-04-18 RX ADMIN — Medication 650 MILLIGRAM(S): at 05:09

## 2019-04-18 RX ADMIN — Medication 81 MILLIGRAM(S): at 11:15

## 2019-04-18 NOTE — CHART NOTE - NSCHARTNOTEFT_GEN_A_CORE
CC: CP    S: no acute events overnight.  No recurrent chest pain.  Ambulating and feeling well, ready to go home.  Tele reviewed: sinus 70-80, 1st degree    O: AVSS, NAD, RRR, CTAB/L, no edema    < from: Nuclear Stress Test-Exercise (04.17.19 @ 16:09) >    IMPRESSIONS:Probably Normal Study  * Exercise capacity: 3 METS, Poor for age and gender.  * Chest Pain: No chest pain with exercise.  * Symptom: Fatigue.  * HR Response: Appropriate.  * BP Response: Appropriate.  * Heart Rhythm: Sinus Rhythm - 68 BPM.  * Conduction defects: 1 degree AV block.  * Baseline ECG: Nonspecific ST-T wave abnormality.  * ECG Changes: No signficant ECG abnormalities as compared  to baseline.  * Arrhythmia: Occasional VPDs occurred during rest, stress  and recovery.  * The left ventricle was normal in size. There is a medium  sized, mild defect in inferior wall that is fixed with  normal wall motion suggestive of diaphragmatic attenuation  artifact.  * Increased right ventricular tracer uptake is noted on  rest and stress imaging.  * TID calculated at 1.21 which is upper limit of normal  for this protocol.  * Post-stress gated wall motion analysis was performed  (LVEF = 70 %;LVEDV = 53 ml.), revealing paradoxical septal  motion due to post-op state with normal LV function.  ---------------------------------------------------------------------    < end of copied text >    A/P: 89 yo man with PMH of CAD s/p CABG (x2 in 2000) and stent x2 (last stent in 2004) HLD, HTN (remote currently not on medications) presents from home with exertional chest pain, enzymes negative, no appreciable EKG changes, underwent nuclear stress testing that showed no significant defect.  Chest pain may be GERD related.  For discharge home today.  Notified Dr Lisker of stress results and d/c plan.  Discharge plan and meds reviewed with patient at bedside and with JETT Omer.  Discharge time: 40min.

## 2019-04-18 NOTE — DISCHARGE NOTE NURSING/CASE MANAGEMENT/SOCIAL WORK - NSDCDPATPORTLINK_GEN_ALL_CORE
You can access the [facility] Patient Portal, offered by [facility], by registering with the following website: [email or web address]

## 2019-05-14 ENCOUNTER — APPOINTMENT (OUTPATIENT)
Dept: CARDIOLOGY | Facility: CLINIC | Age: 84
End: 2019-05-14
Payer: MEDICARE

## 2019-05-14 ENCOUNTER — NON-APPOINTMENT (OUTPATIENT)
Age: 84
End: 2019-05-14

## 2019-05-14 VITALS
SYSTOLIC BLOOD PRESSURE: 126 MMHG | BODY MASS INDEX: 22.49 KG/M2 | HEART RATE: 71 BPM | OXYGEN SATURATION: 98 % | HEIGHT: 65 IN | DIASTOLIC BLOOD PRESSURE: 60 MMHG | WEIGHT: 135 LBS

## 2019-05-14 DIAGNOSIS — K21.9 GASTRO-ESOPHAGEAL REFLUX DISEASE W/OUT ESOPHAGITIS: ICD-10-CM

## 2019-05-14 PROCEDURE — 99214 OFFICE O/P EST MOD 30 MIN: CPT

## 2019-05-14 PROCEDURE — 93000 ELECTROCARDIOGRAM COMPLETE: CPT

## 2019-05-14 RX ORDER — HYDROCODONE BITARTRATE AND ACETAMINOPHEN 7.5; 325 MG/1; MG/1
7.5-325 TABLET ORAL
Qty: 12 | Refills: 0 | Status: DISCONTINUED | COMMUNITY
Start: 2019-02-05

## 2019-05-14 RX ORDER — AMOXICILLIN 500 MG/1
500 CAPSULE ORAL
Qty: 21 | Refills: 0 | Status: DISCONTINUED | COMMUNITY
Start: 2019-02-05

## 2019-05-14 NOTE — REVIEW OF SYSTEMS
[Shortness Of Breath] : no shortness of breath [Dyspnea on exertion] : not dyspnea during exertion [see HPI] : see HPI [Lower Ext Edema] : no extremity edema [Palpitations] : no palpitations [Negative] : Heme/Lymph

## 2019-05-14 NOTE — HISTORY OF PRESENT ILLNESS
[FreeTextEntry1] : He was hospitalized for chest pain and had a normal nuclear stress test.\par He went home.\par No further CP.\par \par Less exertion, though he goes to the gym regularly. recumbent bicycle no dyspnea.\par No chest pain with exercise.\par  \par \par

## 2019-05-14 NOTE — DISCUSSION/SUMMARY
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 No angina. No ECG changes. Recent Nuclear stress test was normal.\par I encouraged him to stay physically active.  \par No changes to his anginal mediation.\par

## 2019-06-12 ENCOUNTER — APPOINTMENT (OUTPATIENT)
Dept: DERMATOLOGY | Facility: CLINIC | Age: 84
End: 2019-06-12

## 2019-07-03 ENCOUNTER — APPOINTMENT (OUTPATIENT)
Dept: DERMATOLOGY | Facility: CLINIC | Age: 84
End: 2019-07-03
Payer: MEDICARE

## 2019-07-03 PROCEDURE — 15789 CHEMICAL PEEL FACIAL DERMAL: CPT

## 2019-08-21 ENCOUNTER — APPOINTMENT (OUTPATIENT)
Dept: CARDIOLOGY | Facility: CLINIC | Age: 84
End: 2019-08-21
Payer: MEDICARE

## 2019-08-21 ENCOUNTER — NON-APPOINTMENT (OUTPATIENT)
Age: 84
End: 2019-08-21

## 2019-08-21 ENCOUNTER — APPOINTMENT (OUTPATIENT)
Dept: CARDIOLOGY | Facility: CLINIC | Age: 84
End: 2019-08-21

## 2019-08-21 VITALS
OXYGEN SATURATION: 98 % | HEART RATE: 71 BPM | DIASTOLIC BLOOD PRESSURE: 50 MMHG | SYSTOLIC BLOOD PRESSURE: 114 MMHG | BODY MASS INDEX: 22.8 KG/M2 | WEIGHT: 137 LBS

## 2019-08-21 PROCEDURE — 93000 ELECTROCARDIOGRAM COMPLETE: CPT

## 2019-08-21 PROCEDURE — 99213 OFFICE O/P EST LOW 20 MIN: CPT

## 2019-08-21 NOTE — DISCUSSION/SUMMARY
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 No angina. No ECG changes. Nuclear stress test was normal.\par I encouraged him to stay physically active.  \par Continue ASA and statin.\par No changes to his anginal mediation.\par

## 2019-08-21 NOTE — PHYSICAL EXAM
[General Appearance - Well Developed] : well developed [General Appearance - Well Nourished] : well nourished [General Appearance - In No Acute Distress] : no acute distress [Normal Conjunctiva] : the conjunctiva exhibited no abnormalities [No Oral Pallor] : no oral pallor [Normal Jugular Venous V Waves Present] : normal jugular venous V waves present [Respiration, Rhythm And Depth] : normal respiratory rhythm and effort [Auscultation Breath Sounds / Voice Sounds] : lungs were clear to auscultation bilaterally [Murmurs] : no murmurs present [Heart Sounds] : normal S1 and S2 [Arterial Pulses Normal] : the arterial pulses were normal [Edema] : no peripheral edema present [Regular] : the rhythm was regular [FreeTextEntry1] : 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 [Bowel Sounds] : normal bowel sounds [Abdomen Soft] : soft [Abnormal Walk] : normal gait [Cyanosis, Localized] : no localized cyanosis [Skin Turgor] : normal skin turgor [Impaired Insight] : insight and judgment were intact [Oriented To Time, Place, And Person] : oriented to person, place, and time [Affect] : the affect was normal

## 2019-08-21 NOTE — HISTORY OF PRESENT ILLNESS
[FreeTextEntry1] : He reports feeling well but still has exertional dyspnea. No chest pain.\par He goes to the gym regularly. recumbent bicycle no dyspnea.\par Pullups and pushups.\par  \par \par

## 2019-11-14 ENCOUNTER — APPOINTMENT (OUTPATIENT)
Dept: UROLOGY | Facility: CLINIC | Age: 84
End: 2019-11-14
Payer: MEDICARE

## 2019-11-14 ENCOUNTER — EMERGENCY (EMERGENCY)
Facility: HOSPITAL | Age: 84
LOS: 1 days | Discharge: HOME | End: 2019-11-14
Attending: EMERGENCY MEDICINE
Payer: MEDICARE

## 2019-11-14 ENCOUNTER — OUTPATIENT (OUTPATIENT)
Dept: OUTPATIENT SERVICES | Facility: HOSPITAL | Age: 84
LOS: 1 days | End: 2019-11-14

## 2019-11-14 VITALS
HEIGHT: 66 IN | DIASTOLIC BLOOD PRESSURE: 91 MMHG | SYSTOLIC BLOOD PRESSURE: 167 MMHG | WEIGHT: 130.07 LBS | TEMPERATURE: 98 F | HEART RATE: 85 BPM | OXYGEN SATURATION: 98 % | RESPIRATION RATE: 18 BRPM

## 2019-11-14 VITALS
OXYGEN SATURATION: 95 % | RESPIRATION RATE: 18 BRPM | SYSTOLIC BLOOD PRESSURE: 156 MMHG | DIASTOLIC BLOOD PRESSURE: 79 MMHG

## 2019-11-14 VITALS
BODY MASS INDEX: 21.63 KG/M2 | WEIGHT: 130 LBS | DIASTOLIC BLOOD PRESSURE: 75 MMHG | RESPIRATION RATE: 17 BRPM | SYSTOLIC BLOOD PRESSURE: 138 MMHG | TEMPERATURE: 97.1 F | HEART RATE: 91 BPM

## 2019-11-14 DIAGNOSIS — R33.8 OTHER RETENTION OF URINE: ICD-10-CM

## 2019-11-14 DIAGNOSIS — Z95.1 PRESENCE OF AORTOCORONARY BYPASS GRAFT: Chronic | ICD-10-CM

## 2019-11-14 LAB
ALBUMIN SERPL ELPH-MCNC: 3.7 G/DL — SIGNIFICANT CHANGE UP (ref 3.3–5)
ALP SERPL-CCNC: 73 U/L — SIGNIFICANT CHANGE UP (ref 40–120)
ALT FLD-CCNC: 13 U/L — SIGNIFICANT CHANGE UP (ref 10–45)
ANION GAP SERPL CALC-SCNC: 11 MMOL/L — SIGNIFICANT CHANGE UP (ref 5–17)
APPEARANCE UR: CLEAR — SIGNIFICANT CHANGE UP
AST SERPL-CCNC: 18 U/L — SIGNIFICANT CHANGE UP (ref 10–40)
BACTERIA # UR AUTO: NEGATIVE — SIGNIFICANT CHANGE UP
BASOPHILS # BLD AUTO: 0.05 K/UL — SIGNIFICANT CHANGE UP (ref 0–0.2)
BASOPHILS NFR BLD AUTO: 0.6 % — SIGNIFICANT CHANGE UP (ref 0–2)
BILIRUB SERPL-MCNC: 0.4 MG/DL — SIGNIFICANT CHANGE UP (ref 0.2–1.2)
BILIRUB UR-MCNC: NEGATIVE — SIGNIFICANT CHANGE UP
BUN SERPL-MCNC: 19 MG/DL — SIGNIFICANT CHANGE UP (ref 7–23)
CALCIUM SERPL-MCNC: 9.1 MG/DL — SIGNIFICANT CHANGE UP (ref 8.4–10.5)
CHLORIDE SERPL-SCNC: 107 MMOL/L — SIGNIFICANT CHANGE UP (ref 96–108)
CO2 SERPL-SCNC: 23 MMOL/L — SIGNIFICANT CHANGE UP (ref 22–31)
COLOR SPEC: SIGNIFICANT CHANGE UP
CREAT SERPL-MCNC: 1.17 MG/DL — SIGNIFICANT CHANGE UP (ref 0.5–1.3)
DIFF PNL FLD: NEGATIVE — SIGNIFICANT CHANGE UP
EOSINOPHIL # BLD AUTO: 0.17 K/UL — SIGNIFICANT CHANGE UP (ref 0–0.5)
EOSINOPHIL NFR BLD AUTO: 2 % — SIGNIFICANT CHANGE UP (ref 0–6)
EPI CELLS # UR: 0 /HPF — SIGNIFICANT CHANGE UP
GLUCOSE SERPL-MCNC: 123 MG/DL — HIGH (ref 70–99)
GLUCOSE UR QL: NEGATIVE — SIGNIFICANT CHANGE UP
HCT VFR BLD CALC: 32.9 % — LOW (ref 39–50)
HGB BLD-MCNC: 10.8 G/DL — LOW (ref 13–17)
HYALINE CASTS # UR AUTO: 0 /LPF — SIGNIFICANT CHANGE UP (ref 0–2)
IMM GRANULOCYTES NFR BLD AUTO: 0.4 % — SIGNIFICANT CHANGE UP (ref 0–1.5)
KETONES UR-MCNC: NEGATIVE — SIGNIFICANT CHANGE UP
LEUKOCYTE ESTERASE UR-ACNC: NEGATIVE — SIGNIFICANT CHANGE UP
LYMPHOCYTES # BLD AUTO: 1.1 K/UL — SIGNIFICANT CHANGE UP (ref 1–3.3)
LYMPHOCYTES # BLD AUTO: 13.2 % — SIGNIFICANT CHANGE UP (ref 13–44)
MCHC RBC-ENTMCNC: 31.2 PG — SIGNIFICANT CHANGE UP (ref 27–34)
MCHC RBC-ENTMCNC: 32.8 GM/DL — SIGNIFICANT CHANGE UP (ref 32–36)
MCV RBC AUTO: 95.1 FL — SIGNIFICANT CHANGE UP (ref 80–100)
MONOCYTES # BLD AUTO: 0.69 K/UL — SIGNIFICANT CHANGE UP (ref 0–0.9)
MONOCYTES NFR BLD AUTO: 8.3 % — SIGNIFICANT CHANGE UP (ref 2–14)
NEUTROPHILS # BLD AUTO: 6.27 K/UL — SIGNIFICANT CHANGE UP (ref 1.8–7.4)
NEUTROPHILS NFR BLD AUTO: 75.5 % — SIGNIFICANT CHANGE UP (ref 43–77)
NITRITE UR-MCNC: NEGATIVE — SIGNIFICANT CHANGE UP
NRBC # BLD: 0 /100 WBCS — SIGNIFICANT CHANGE UP (ref 0–0)
PH UR: 7 — SIGNIFICANT CHANGE UP (ref 5–8)
PLATELET # BLD AUTO: 182 K/UL — SIGNIFICANT CHANGE UP (ref 150–400)
POTASSIUM SERPL-MCNC: 4.6 MMOL/L — SIGNIFICANT CHANGE UP (ref 3.5–5.3)
POTASSIUM SERPL-SCNC: 4.6 MMOL/L — SIGNIFICANT CHANGE UP (ref 3.5–5.3)
PROT SERPL-MCNC: 6.1 G/DL — SIGNIFICANT CHANGE UP (ref 6–8.3)
PROT UR-MCNC: NEGATIVE — SIGNIFICANT CHANGE UP
RBC # BLD: 3.46 M/UL — LOW (ref 4.2–5.8)
RBC # FLD: 14.3 % — SIGNIFICANT CHANGE UP (ref 10.3–14.5)
RBC CASTS # UR COMP ASSIST: 3 /HPF — SIGNIFICANT CHANGE UP (ref 0–4)
SODIUM SERPL-SCNC: 141 MMOL/L — SIGNIFICANT CHANGE UP (ref 135–145)
SP GR SPEC: 1.02 — SIGNIFICANT CHANGE UP (ref 1.01–1.02)
UROBILINOGEN FLD QL: NEGATIVE — SIGNIFICANT CHANGE UP
WBC # BLD: 8.31 K/UL — SIGNIFICANT CHANGE UP (ref 3.8–10.5)
WBC # FLD AUTO: 8.31 K/UL — SIGNIFICANT CHANGE UP (ref 3.8–10.5)
WBC UR QL: 2 /HPF — SIGNIFICANT CHANGE UP (ref 0–5)

## 2019-11-14 PROCEDURE — 99284 EMERGENCY DEPT VISIT MOD MDM: CPT | Mod: GC

## 2019-11-14 PROCEDURE — 51700 IRRIGATION OF BLADDER: CPT

## 2019-11-14 PROCEDURE — 99204 OFFICE O/P NEW MOD 45 MIN: CPT | Mod: 25

## 2019-11-14 PROCEDURE — 51702 INSERT TEMP BLADDER CATH: CPT

## 2019-11-14 PROCEDURE — 80053 COMPREHEN METABOLIC PANEL: CPT

## 2019-11-14 PROCEDURE — 81001 URINALYSIS AUTO W/SCOPE: CPT

## 2019-11-14 PROCEDURE — 87086 URINE CULTURE/COLONY COUNT: CPT

## 2019-11-14 PROCEDURE — 99283 EMERGENCY DEPT VISIT LOW MDM: CPT

## 2019-11-14 PROCEDURE — 85027 COMPLETE CBC AUTOMATED: CPT

## 2019-11-14 RX ORDER — MULTIVIT WITH MIN/MFOLATE/K2 340-15/3 G
1 POWDER (GRAM) ORAL ONCE
Refills: 0 | Status: COMPLETED | OUTPATIENT
Start: 2019-11-14 | End: 2019-11-14

## 2019-11-14 RX ADMIN — Medication 1 ENEMA: at 02:25

## 2019-11-14 RX ADMIN — Medication 1 BOTTLE: at 02:37

## 2019-11-14 NOTE — REVIEW OF SYSTEMS
[Abdominal Pain] : abdominal pain [Eyesight Problems] : eyesight problems [Constipation] : constipation [Seen by urologist before (Name)  ___] : Preciously seen by a urologist: [unfilled] [Difficulty Walking] : difficulty walking [Negative] : Heme/Lymph [Wake up at night to urinate  How many times?  ___] : wakes up to urinate [unfilled] times during the night [FreeTextEntry2] : frequent urination 4x in the daytime

## 2019-11-14 NOTE — ED PROVIDER NOTE - CLINICAL SUMMARY MEDICAL DECISION MAKING FREE TEXT BOX
Constipated/rectal pain. No fissures/tears appreciated. Likely constipation as benign abd exam and no n/v. Disimpaction, enema/laxative

## 2019-11-14 NOTE — ED ADULT NURSE NOTE - NSIMPLEMENTINTERV_GEN_ALL_ED
Implemented All Universal Safety Interventions:  [place] to call system. Call bell, personal items and telephone within reach. Instruct patient to call for assistance. Room bathroom lighting operational. Non-slip footwear when patient is off stretcher. Physically safe environment: no spills, clutter or unnecessary equipment. Stretcher in lowest position, wheels locked, appropriate side rails in place.

## 2019-11-14 NOTE — PHYSICAL EXAM
[General Appearance - Well Developed] : well developed [Normal Appearance] : normal appearance [General Appearance - Well Nourished] : well nourished [Well Groomed] : well groomed [General Appearance - In No Acute Distress] : no acute distress [Respiration, Rhythm And Depth] : normal respiratory rhythm and effort [] : no respiratory distress [Abdomen Soft] : soft [Exaggerated Use Of Accessory Muscles For Inspiration] : no accessory muscle use [Abdomen Tenderness] : non-tender [FreeTextEntry1] : Lyon catheter in bladder [Normal Station and Gait] : the gait and station were normal for the patient's age [Skin Color & Pigmentation] : normal skin color and pigmentation [No Focal Deficits] : no focal deficits [Oriented To Time, Place, And Person] : oriented to person, place, and time [Affect] : the affect was normal [Mood] : the mood was normal [Not Anxious] : not anxious

## 2019-11-14 NOTE — ED PROVIDER NOTE - ATTENDING CONTRIBUTION TO CARE
MD Francis:  patient seen and evaluated personally.   I agree with the History & Physical,  Impression & Plan other than what was detailed in my note.  MD Francis    88 y/o m presenting w/ cc of pain in rectum with straining and going to the bathroom, feels like he ahs to go, constipated x 3 days. also having urinary retention (denies back pain, back trauma, new unilateral weakness, perianal numbness/tingling), only dribbling today. pt denies abd pain, blood in stool. afebrile vital stable, , lareg amount of hard stool reported on exam, pt feeling somewhat better. mild suprapubic ttp. mild distention, bladder scan 300, will give lee, check cbc, bmp. re evaluate after. given no abd ttp, no abd pain, do not feel ct warranted at this time.

## 2019-11-14 NOTE — HISTORY OF PRESENT ILLNESS
[FreeTextEntry1] : 11/14/2019:LUISA GEORGE is a 89 year old male who presents for initial evaluation of urinary obstruction. He presented to the clinic today stating that he was experience severe lower abdominal pain. He reports not being able to urinate though he had the urgency to and was catheterized at a hospital. He notes having symptoms of constipation and occasional leaking.  The patient has no hx of prostate cancer and a hx of coronary artery bypass graft and cardiac stents. Of note, he tolerates Keflex and has been taking Avodart and Flomax . He is a retired pediatrician.

## 2019-11-14 NOTE — ED ADULT NURSE REASSESSMENT NOTE - NS ED NURSE REASSESS COMMENT FT1
0225*-patient tolerated fleet enema. Bladder scan done revealed > 322 ml. Ruben RAMIREZ made aware. Will wait till patient be able to move his bowels.

## 2019-11-14 NOTE — ED PROVIDER NOTE - OBJECTIVE STATEMENT
89yo M hx CABG in past p/w constipation x 3 days only passing small pellet today and having rectal discomfort with each attempt at bm. Passing liquid whhich each bm attempt.  Thinks hes retaining urine d/t only urinating small amount today. Hx abdominal sx and SBO but this does not feel similar. Denies abd bloating/pain.

## 2019-11-14 NOTE — ED PROVIDER NOTE - NSFOLLOWUPCLINICS_GEN_ALL_ED_FT
[facility] - Urology  Urology  [address], [address] & [address] [place] [address]  Phone: [phone]  Fax:   Follow Up Time:     [facility]  Gastroenterology  [address] [place] [address] [address]  [place] [address]  Phone: [phone]  Fax:   Follow Up Time:

## 2019-11-14 NOTE — ASSESSMENT
[FreeTextEntry1] : 11/14/2019:LUISA GEORGE is a 89 year old male who presents for initial evaluation of urinary obstruction. He presented to the clinic today stating that he was experience severe lower abdominal pain. He reports not being able to urinate though he had the urgency to and was catheterized at a hospital. He notes having symptoms of constipation and occasional leaking.  The patient has no hx of prostate cancer and a hx of coronary artery bypass graft and cardiac stents. Of note, he tolerates Caflex and has been taking Avodart and Flomax . He is a retired pediatrician.\par \par The bladder scan by PVR was measured today: 20ml remained post void. \par \par I prescribed the patient Tamsulosin 0.4 mg once daily after a meal. I advised the patient the side effects of nasal congestion and light-headedness. \par \par Patient may follow up in 2 weeks or sooner if they experience acute symptoms.

## 2019-11-14 NOTE — ED ADULT NURSE REASSESSMENT NOTE - NS ED NURSE REASSESS COMMENT FT1
Indwelling FC fr 22 inserted aseptically for acute urinary retention/obstruction with urojet, tolerated well with UO of 400ml noted, UA specimen sent, pt verbalized improvement.

## 2019-11-14 NOTE — ED ADULT NURSE REASSESSMENT NOTE - NS ED NURSE REASSESS COMMENT FT1
0420- lee changed to leg bag. Patient is a retired physician. Verbalized awareness & understanding about lee care. Assisted to get all dressed up. Patient verbalized understanding. OOb with slow steady gait.

## 2019-11-14 NOTE — ED PROVIDER NOTE - PATIENT PORTAL LINK FT
You can access the FollowMyHealth Patient Portal offered by [facility] by registering at the following website: [email or web address]. By joining [facility]’s FollowMyHealth portal, you will also be able to view your health information using other applications (apps) compatible with our system.

## 2019-11-14 NOTE — ED PROVIDER NOTE - PHYSICAL EXAMINATION
Gen: Well appearing, NAD  Head: NCAT  HEENT: PERRL, MMM, normal conjunctiva, anicteric, neck supple  Lung: CTAB, no adventitious sounds  CV: RRR, no murmurs  Abd: soft, NTND, no rebound or guarding, no CVAT  MSK: No edema, no visible deformities  Neuro: Moving all extremity grossly  Skin: Warm and dry, no evidence of rash  Psych: normal mood and affect  SAVITA: No fissues/tears, hard stool in rectal vault disimpacted

## 2019-11-14 NOTE — ADDENDUM
[FreeTextEntry1] : This note was authored by Anali Gardiner, working as scribe for Dr. Cedric Eason.\par \par I, Dr. Cedric Eason, have have reviewed the content of this note and confirm it is true and accurate. I personally performed the history and physical examination and made all the decisions. \par \par 11/14/2019

## 2019-11-14 NOTE — ED PROVIDER NOTE - NSFOLLOWUPINSTRUCTIONS_ED_ALL_ED_FT
- Follow up with urology this week    - Follow up with gastroenterology for continued constipation symptoms. Consider use of over-the-counter miralax or colace/senna as directed for continued symptoms.    - Lab and imaging results, if performed, were discussed with you along with your discharge diagnosis    - Follow up with your doctor in 1 week - bring copies of your results if you were given. If you do not have a primary doctor, please call [phone] to find one convenient for you    - Return to the ED for any new, worsening, or concerning symptoms to you    - Continue all prescribed medications    - Take ibuprofen/tylenol as directed as needed for pain    - Rest and keep yourself hydrated with fluids

## 2019-11-15 LAB
CULTURE RESULTS: NO GROWTH — SIGNIFICANT CHANGE UP
SPECIMEN SOURCE: SIGNIFICANT CHANGE UP

## 2019-11-18 ENCOUNTER — RX CHANGE (OUTPATIENT)
Age: 84
End: 2019-11-18

## 2019-11-19 ENCOUNTER — APPOINTMENT (OUTPATIENT)
Dept: CARDIOLOGY | Facility: CLINIC | Age: 84
End: 2019-11-19

## 2019-11-19 ENCOUNTER — APPOINTMENT (OUTPATIENT)
Dept: UROLOGY | Facility: CLINIC | Age: 84
End: 2019-11-19

## 2019-11-21 DIAGNOSIS — R33.8 OTHER RETENTION OF URINE: ICD-10-CM

## 2019-12-03 ENCOUNTER — APPOINTMENT (OUTPATIENT)
Dept: UROLOGY | Facility: CLINIC | Age: 84
End: 2019-12-03

## 2019-12-23 ENCOUNTER — APPOINTMENT (OUTPATIENT)
Dept: UROLOGY | Facility: CLINIC | Age: 84
End: 2019-12-23

## 2020-02-14 ENCOUNTER — APPOINTMENT (OUTPATIENT)
Dept: PULMONOLOGY | Facility: CLINIC | Age: 85
End: 2020-02-14
Payer: MEDICARE

## 2020-02-14 VITALS
BODY MASS INDEX: 21.49 KG/M2 | SYSTOLIC BLOOD PRESSURE: 130 MMHG | TEMPERATURE: 97.4 F | WEIGHT: 129 LBS | HEART RATE: 95 BPM | RESPIRATION RATE: 15 BRPM | DIASTOLIC BLOOD PRESSURE: 77 MMHG | HEIGHT: 65 IN | OXYGEN SATURATION: 98 %

## 2020-02-14 DIAGNOSIS — R05 COUGH: ICD-10-CM

## 2020-02-14 PROCEDURE — 99213 OFFICE O/P EST LOW 20 MIN: CPT

## 2020-02-14 NOTE — REVIEW OF SYSTEMS
[Fatigue] : fatigue [Cough] : cough [SOB on Exertion] : sob on exertion [Diarrhea] : diarrhea [Nocturia] : nocturia [Negative] : HEENT [TextBox_44] : stent placement

## 2020-02-14 NOTE — PHYSICAL EXAM
[No Acute Distress] : no acute distress [Normal Appearance] : normal appearance [No Neck Mass] : no neck mass [Normal S1, S2] : normal s1, s2 [No Resp Distress] : no resp distress [Normal Rate/Rhythm] : normal rate/rhythm [Normal Gait] : normal gait [Clear to Auscultation Bilaterally] : clear to auscultation bilaterally [No Clubbing] : no clubbing [No Edema] : no edema

## 2020-02-14 NOTE — HISTORY OF PRESENT ILLNESS
[TextBox_4] : 89-year-old male who I have seen for recurrent Mycobacterium avium and a persistent productive cough. He has been reasonably well over the last 2 years. Cough has been minimal. He does complain of dyspnea on exertion but denies any chest discomfort. He also denies any peripheral edema or fevers.\par He usually comes in accompanied by his wife but she passed away this past year.

## 2020-02-14 NOTE — ASSESSMENT
[FreeTextEntry1] : walking approximately 100 feet, the patient developed mild dyspnea and his heart rate went from a baseline of 72-95. Oxygen saturations were maintained above 93%. I suspect his dyspnea is a combination of several issues including some obstructive disease, diminished LV function. Otherwise he has had no subsequent issues related to his mycobacterial infection. I have not imaged him any further at this point. She will followup with me on an as-needed basis.

## 2020-02-19 ENCOUNTER — NON-APPOINTMENT (OUTPATIENT)
Age: 85
End: 2020-02-19

## 2020-02-19 ENCOUNTER — APPOINTMENT (OUTPATIENT)
Dept: CARDIOLOGY | Facility: CLINIC | Age: 85
End: 2020-02-19
Payer: MEDICARE

## 2020-02-19 ENCOUNTER — APPOINTMENT (OUTPATIENT)
Dept: CARDIOLOGY | Facility: CLINIC | Age: 85
End: 2020-02-19

## 2020-02-19 VITALS
BODY MASS INDEX: 21.66 KG/M2 | SYSTOLIC BLOOD PRESSURE: 134 MMHG | WEIGHT: 130 LBS | HEART RATE: 72 BPM | DIASTOLIC BLOOD PRESSURE: 68 MMHG | OXYGEN SATURATION: 98 % | HEIGHT: 65 IN

## 2020-02-19 PROCEDURE — 93000 ELECTROCARDIOGRAM COMPLETE: CPT

## 2020-02-19 PROCEDURE — 99214 OFFICE O/P EST MOD 30 MIN: CPT

## 2020-02-26 ENCOUNTER — NON-APPOINTMENT (OUTPATIENT)
Age: 85
End: 2020-02-26

## 2020-02-26 ENCOUNTER — APPOINTMENT (OUTPATIENT)
Dept: OPHTHALMOLOGY | Facility: CLINIC | Age: 85
End: 2020-02-26
Payer: MEDICARE

## 2020-02-26 PROCEDURE — 92014 COMPRE OPH EXAM EST PT 1/>: CPT

## 2020-02-26 PROCEDURE — 92015 DETERMINE REFRACTIVE STATE: CPT

## 2020-03-02 NOTE — PHYSICAL EXAM
[General Appearance - Well Developed] : well developed [General Appearance - Well Nourished] : well nourished [General Appearance - In No Acute Distress] : no acute distress [Normal Conjunctiva] : the conjunctiva exhibited no abnormalities [No Oral Pallor] : no oral pallor [Normal Jugular Venous V Waves Present] : normal jugular venous V waves present [Respiration, Rhythm And Depth] : normal respiratory rhythm and effort [Auscultation Breath Sounds / Voice Sounds] : lungs were clear to auscultation bilaterally [Heart Sounds] : normal S1 and S2 [Murmurs] : no murmurs present [Edema] : no peripheral edema present [Arterial Pulses Normal] : the arterial pulses were normal [Regular] : the rhythm was regular [Bowel Sounds] : normal bowel sounds [Abdomen Soft] : soft [Abnormal Walk] : normal gait [Cyanosis, Localized] : no localized cyanosis [Skin Turgor] : normal skin turgor [Oriented To Time, Place, And Person] : oriented to person, place, and time [Impaired Insight] : insight and judgment were intact [Affect] : the affect was normal [FreeTextEntry1] : 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

## 2020-03-02 NOTE — HISTORY OF PRESENT ILLNESS
[FreeTextEntry1] : He reports feeling well but describes progressive exertional dyspnea without chest pain.\par Evaluation by pulm showed no desaturation and no worsening lung issues is suspected.\par \par He goes to the gym regularly. recumbent bicycle no dyspnea.\par Pullups and pushups are unchanged.\par  \par \par

## 2020-03-02 NOTE — DISCUSSION/SUMMARY
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 No angina. No ECG changes. Nuclear stress test was normal.\par I suggested a stress echo to monitor his exercise capacity and exclude worsening ischemic heart disease as the source of his symptoms.\par Continue ASA and statin.\par No changes to his anginal mediation.\par

## 2020-03-02 NOTE — REVIEW OF SYSTEMS
[see HPI] : see HPI [Negative] : Psychiatric [Lower Ext Edema] : no extremity edema [Dyspnea on exertion] : not dyspnea during exertion [Shortness Of Breath] : no shortness of breath [Palpitations] : no palpitations

## 2020-03-04 ENCOUNTER — LABORATORY RESULT (OUTPATIENT)
Age: 85
End: 2020-03-04

## 2020-03-04 ENCOUNTER — APPOINTMENT (OUTPATIENT)
Dept: DERMATOLOGY | Facility: CLINIC | Age: 85
End: 2020-03-04
Payer: MEDICARE

## 2020-03-04 DIAGNOSIS — D48.9 NEOPLASM OF UNCERTAIN BEHAVIOR, UNSPECIFIED: ICD-10-CM

## 2020-03-04 PROCEDURE — 11102 TANGNTL BX SKIN SINGLE LES: CPT | Mod: 59

## 2020-03-04 PROCEDURE — 17000 DESTRUCT PREMALG LESION: CPT | Mod: 59

## 2020-03-04 PROCEDURE — 17003 DESTRUCT PREMALG LES 2-14: CPT | Mod: 59

## 2020-03-04 PROCEDURE — 99213 OFFICE O/P EST LOW 20 MIN: CPT | Mod: 25

## 2020-03-11 ENCOUNTER — EMERGENCY (EMERGENCY)
Facility: HOSPITAL | Age: 85
LOS: 1 days | Discharge: LEFT BEFORE DISCHARGE | End: 2020-03-11
Admitting: EMERGENCY MEDICINE
Payer: MEDICARE

## 2020-03-11 VITALS
TEMPERATURE: 98 F | HEART RATE: 78 BPM | WEIGHT: 130.07 LBS | OXYGEN SATURATION: 99 % | HEIGHT: 66 IN | SYSTOLIC BLOOD PRESSURE: 130 MMHG | DIASTOLIC BLOOD PRESSURE: 75 MMHG | RESPIRATION RATE: 20 BRPM

## 2020-03-11 DIAGNOSIS — Z95.1 PRESENCE OF AORTOCORONARY BYPASS GRAFT: Chronic | ICD-10-CM

## 2020-03-11 PROCEDURE — L9991: CPT

## 2020-05-13 ENCOUNTER — NON-APPOINTMENT (OUTPATIENT)
Age: 85
End: 2020-05-13

## 2020-05-13 ENCOUNTER — APPOINTMENT (OUTPATIENT)
Dept: CARDIOLOGY | Facility: CLINIC | Age: 85
End: 2020-05-13
Payer: MEDICARE

## 2020-05-13 VITALS
WEIGHT: 129 LBS | TEMPERATURE: 97.7 F | HEART RATE: 69 BPM | OXYGEN SATURATION: 100 % | DIASTOLIC BLOOD PRESSURE: 68 MMHG | BODY MASS INDEX: 21.49 KG/M2 | SYSTOLIC BLOOD PRESSURE: 116 MMHG | HEIGHT: 65 IN

## 2020-05-13 DIAGNOSIS — R06.00 DYSPNEA, UNSPECIFIED: ICD-10-CM

## 2020-05-13 PROCEDURE — 99215 OFFICE O/P EST HI 40 MIN: CPT

## 2020-05-13 PROCEDURE — 93000 ELECTROCARDIOGRAM COMPLETE: CPT

## 2020-05-13 NOTE — DISCUSSION/SUMMARY
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 No angina. No ECG changes. Nuclear stress test was normal.\par I suggested a nuclear stress test to exclude worsening ischemia.\par He would also benefit from an echo first.\par qod lasix for now.\par Continue ASA and statin.\par No changes to his anginal mediation \par

## 2020-05-13 NOTE — HISTORY OF PRESENT ILLNESS
[FreeTextEntry1] : Some episodes of chest pain a month ago..\par No similar to prior [place].\par Improved exertional dyspena and ankle edema with lasix.\par No orthopnea.\par Increased urine output noted.\par  \par \par

## 2020-05-14 LAB
ANION GAP SERPL CALC-SCNC: 11 MMOL/L
BASOPHILS # BLD AUTO: 0.06 K/UL
BASOPHILS NFR BLD AUTO: 0.8 %
BUN SERPL-MCNC: 24 MG/DL
CALCIUM SERPL-MCNC: 9 MG/DL
CHLORIDE SERPL-SCNC: 105 MMOL/L
CO2 SERPL-SCNC: 25 MMOL/L
CREAT SERPL-MCNC: 1.47 MG/DL
EOSINOPHIL # BLD AUTO: 0.29 K/UL
EOSINOPHIL NFR BLD AUTO: 4 %
GLUCOSE SERPL-MCNC: 112 MG/DL
HCT VFR BLD CALC: 37.5 %
HGB BLD-MCNC: 11.6 G/DL
IMM GRANULOCYTES NFR BLD AUTO: 0.3 %
LYMPHOCYTES # BLD AUTO: 1.48 K/UL
LYMPHOCYTES NFR BLD AUTO: 20.5 %
MAN DIFF?: NORMAL
MCHC RBC-ENTMCNC: 30.9 GM/DL
MCHC RBC-ENTMCNC: 31.4 PG
MCV RBC AUTO: 101.6 FL
MONOCYTES # BLD AUTO: 0.72 K/UL
MONOCYTES NFR BLD AUTO: 10 %
NEUTROPHILS # BLD AUTO: 4.64 K/UL
NEUTROPHILS NFR BLD AUTO: 64.4 %
NT-PROBNP SERPL-MCNC: 533 PG/ML
PLATELET # BLD AUTO: 214 K/UL
POTASSIUM SERPL-SCNC: 4.5 MMOL/L
RBC # BLD: 3.69 M/UL
RBC # FLD: 14 %
SODIUM SERPL-SCNC: 141 MMOL/L
WBC # FLD AUTO: 7.21 K/UL

## 2020-05-20 ENCOUNTER — APPOINTMENT (OUTPATIENT)
Dept: CARDIOLOGY | Facility: CLINIC | Age: 85
End: 2020-05-20

## 2020-05-28 ENCOUNTER — APPOINTMENT (OUTPATIENT)
Dept: CARDIOLOGY | Facility: CLINIC | Age: 85
End: 2020-05-28
Payer: MEDICARE

## 2020-05-28 PROCEDURE — 93015 CV STRESS TEST SUPVJ I&R: CPT

## 2020-05-28 PROCEDURE — 78452 HT MUSCLE IMAGE SPECT MULT: CPT

## 2020-05-28 PROCEDURE — 93306 TTE W/DOPPLER COMPLETE: CPT

## 2020-05-28 PROCEDURE — A9500: CPT

## 2020-05-28 RX ORDER — REGADENOSON 0.08 MG/ML
0.4 INJECTION, SOLUTION INTRAVENOUS
Qty: 1 | Refills: 0 | Status: COMPLETED | OUTPATIENT
Start: 2020-05-28

## 2020-05-28 RX ADMIN — REGADENOSON 0.4 MG/5ML: 0.08 INJECTION, SOLUTION INTRAVENOUS at 00:00

## 2020-08-28 ENCOUNTER — INPATIENT (INPATIENT)
Facility: HOSPITAL | Age: 85
LOS: 3 days | Discharge: HOME | DRG: 394 | End: 2020-09-01
Attending: SURGERY | Admitting: SURGERY
Payer: MEDICARE

## 2020-08-28 VITALS
RESPIRATION RATE: 20 BRPM | TEMPERATURE: 98 F | OXYGEN SATURATION: 97 % | DIASTOLIC BLOOD PRESSURE: 86 MMHG | SYSTOLIC BLOOD PRESSURE: 127 MMHG | HEART RATE: 86 BPM

## 2020-08-28 DIAGNOSIS — Z29.9 ENCOUNTER FOR PROPHYLACTIC MEASURES, UNSPECIFIED: ICD-10-CM

## 2020-08-28 DIAGNOSIS — I25.810 ATHEROSCLEROSIS OF CORONARY ARTERY BYPASS GRAFT(S) WITHOUT ANGINA PECTORIS: ICD-10-CM

## 2020-08-28 DIAGNOSIS — K56.609 UNSPECIFIED INTESTINAL OBSTRUCTION, UNSPECIFIED AS TO PARTIAL VERSUS COMPLETE OBSTRUCTION: ICD-10-CM

## 2020-08-28 DIAGNOSIS — K21.9 GASTRO-ESOPHAGEAL REFLUX DISEASE WITHOUT ESOPHAGITIS: ICD-10-CM

## 2020-08-28 DIAGNOSIS — Z95.1 PRESENCE OF AORTOCORONARY BYPASS GRAFT: Chronic | ICD-10-CM

## 2020-08-28 DIAGNOSIS — N40.0 BENIGN PROSTATIC HYPERPLASIA WITHOUT LOWER URINARY TRACT SYMPTOMS: ICD-10-CM

## 2020-08-28 DIAGNOSIS — E78.5 HYPERLIPIDEMIA, UNSPECIFIED: ICD-10-CM

## 2020-08-28 LAB
ALBUMIN SERPL ELPH-MCNC: 3.5 G/DL — SIGNIFICANT CHANGE UP (ref 3.3–5)
ALP SERPL-CCNC: 79 U/L — SIGNIFICANT CHANGE UP (ref 40–120)
ALT FLD-CCNC: 14 U/L — SIGNIFICANT CHANGE UP (ref 10–45)
ANION GAP SERPL CALC-SCNC: 11 MMOL/L — SIGNIFICANT CHANGE UP (ref 5–17)
APTT BLD: 34.7 SEC — SIGNIFICANT CHANGE UP (ref 27.5–35.5)
AST SERPL-CCNC: 19 U/L — SIGNIFICANT CHANGE UP (ref 10–40)
BASE EXCESS BLDV CALC-SCNC: 1.6 MMOL/L — SIGNIFICANT CHANGE UP (ref -2–2)
BASOPHILS # BLD AUTO: 0.05 K/UL — SIGNIFICANT CHANGE UP (ref 0–0.2)
BASOPHILS NFR BLD AUTO: 0.7 % — SIGNIFICANT CHANGE UP (ref 0–2)
BILIRUB SERPL-MCNC: 0.3 MG/DL — SIGNIFICANT CHANGE UP (ref 0.2–1.2)
BLD GP AB SCN SERPL QL: NEGATIVE — SIGNIFICANT CHANGE UP
BUN SERPL-MCNC: 20 MG/DL — SIGNIFICANT CHANGE UP (ref 7–23)
CA-I SERPL-SCNC: 1.15 MMOL/L — SIGNIFICANT CHANGE UP (ref 1.12–1.3)
CALCIUM SERPL-MCNC: 9 MG/DL — SIGNIFICANT CHANGE UP (ref 8.4–10.5)
CHLORIDE BLDV-SCNC: 110 MMOL/L — HIGH (ref 96–108)
CHLORIDE SERPL-SCNC: 106 MMOL/L — SIGNIFICANT CHANGE UP (ref 96–108)
CO2 BLDV-SCNC: 27 MMOL/L — SIGNIFICANT CHANGE UP (ref 22–30)
CO2 SERPL-SCNC: 24 MMOL/L — SIGNIFICANT CHANGE UP (ref 22–31)
CREAT SERPL-MCNC: 1.32 MG/DL — HIGH (ref 0.5–1.3)
EOSINOPHIL # BLD AUTO: 0.38 K/UL — SIGNIFICANT CHANGE UP (ref 0–0.5)
EOSINOPHIL NFR BLD AUTO: 5.2 % — SIGNIFICANT CHANGE UP (ref 0–6)
GAS PNL BLDV: 136 MMOL/L — SIGNIFICANT CHANGE UP (ref 135–145)
GAS PNL BLDV: SIGNIFICANT CHANGE UP
GAS PNL BLDV: SIGNIFICANT CHANGE UP
GLUCOSE BLDV-MCNC: 109 MG/DL — HIGH (ref 70–99)
GLUCOSE SERPL-MCNC: 115 MG/DL — HIGH (ref 70–99)
HCO3 BLDV-SCNC: 26 MMOL/L — SIGNIFICANT CHANGE UP (ref 21–29)
HCT VFR BLD CALC: 35 % — LOW (ref 39–50)
HCT VFR BLDA CALC: 36 % — LOW (ref 39–50)
HGB BLD CALC-MCNC: 11.8 G/DL — LOW (ref 13–17)
HGB BLD-MCNC: 11.2 G/DL — LOW (ref 13–17)
IMM GRANULOCYTES NFR BLD AUTO: 0.5 % — SIGNIFICANT CHANGE UP (ref 0–1.5)
INR BLD: 1.03 RATIO — SIGNIFICANT CHANGE UP (ref 0.88–1.16)
LACTATE BLDV-MCNC: 1.7 MMOL/L — SIGNIFICANT CHANGE UP (ref 0.7–2)
LYMPHOCYTES # BLD AUTO: 1.23 K/UL — SIGNIFICANT CHANGE UP (ref 1–3.3)
LYMPHOCYTES # BLD AUTO: 16.8 % — SIGNIFICANT CHANGE UP (ref 13–44)
MCHC RBC-ENTMCNC: 31.8 PG — SIGNIFICANT CHANGE UP (ref 27–34)
MCHC RBC-ENTMCNC: 32 GM/DL — SIGNIFICANT CHANGE UP (ref 32–36)
MCV RBC AUTO: 99.4 FL — SIGNIFICANT CHANGE UP (ref 80–100)
MONOCYTES # BLD AUTO: 0.74 K/UL — SIGNIFICANT CHANGE UP (ref 0–0.9)
MONOCYTES NFR BLD AUTO: 10.1 % — SIGNIFICANT CHANGE UP (ref 2–14)
NEUTROPHILS # BLD AUTO: 4.88 K/UL — SIGNIFICANT CHANGE UP (ref 1.8–7.4)
NEUTROPHILS NFR BLD AUTO: 66.7 % — SIGNIFICANT CHANGE UP (ref 43–77)
NRBC # BLD: 0 /100 WBCS — SIGNIFICANT CHANGE UP (ref 0–0)
PCO2 BLDV: 42 MMHG — SIGNIFICANT CHANGE UP (ref 35–50)
PH BLDV: 7.41 — SIGNIFICANT CHANGE UP (ref 7.35–7.45)
PLATELET # BLD AUTO: 222 K/UL — SIGNIFICANT CHANGE UP (ref 150–400)
PO2 BLDV: 25 MMHG — SIGNIFICANT CHANGE UP (ref 25–45)
POTASSIUM BLDV-SCNC: 3.8 MMOL/L — SIGNIFICANT CHANGE UP (ref 3.5–5.3)
POTASSIUM SERPL-MCNC: 4.1 MMOL/L — SIGNIFICANT CHANGE UP (ref 3.5–5.3)
POTASSIUM SERPL-SCNC: 4.1 MMOL/L — SIGNIFICANT CHANGE UP (ref 3.5–5.3)
PROT SERPL-MCNC: 5.7 G/DL — LOW (ref 6–8.3)
PROTHROM AB SERPL-ACNC: 12.2 SEC — SIGNIFICANT CHANGE UP (ref 10.6–13.6)
RBC # BLD: 3.52 M/UL — LOW (ref 4.2–5.8)
RBC # FLD: 13.6 % — SIGNIFICANT CHANGE UP (ref 10.3–14.5)
RH IG SCN BLD-IMP: POSITIVE — SIGNIFICANT CHANGE UP
SAO2 % BLDV: 40 % — LOW (ref 67–88)
SARS-COV-2 RNA SPEC QL NAA+PROBE: SIGNIFICANT CHANGE UP
SODIUM SERPL-SCNC: 141 MMOL/L — SIGNIFICANT CHANGE UP (ref 135–145)
WBC # BLD: 7.32 K/UL — SIGNIFICANT CHANGE UP (ref 3.8–10.5)
WBC # FLD AUTO: 7.32 K/UL — SIGNIFICANT CHANGE UP (ref 3.8–10.5)

## 2020-08-28 PROCEDURE — 71045 X-RAY EXAM CHEST 1 VIEW: CPT | Mod: 26,77

## 2020-08-28 PROCEDURE — 74177 CT ABD & PELVIS W/CONTRAST: CPT | Mod: 26

## 2020-08-28 PROCEDURE — 43753 TX GASTRO INTUB W/ASP: CPT | Mod: GC

## 2020-08-28 PROCEDURE — 99285 EMERGENCY DEPT VISIT HI MDM: CPT | Mod: CS,25,GC

## 2020-08-28 PROCEDURE — 71045 X-RAY EXAM CHEST 1 VIEW: CPT | Mod: 26

## 2020-08-28 PROCEDURE — 99223 1ST HOSP IP/OBS HIGH 75: CPT

## 2020-08-28 PROCEDURE — 99232 SBSQ HOSP IP/OBS MODERATE 35: CPT

## 2020-08-28 PROCEDURE — 71045 X-RAY EXAM CHEST 1 VIEW: CPT | Mod: 26,76

## 2020-08-28 RX ORDER — SODIUM CHLORIDE 9 MG/ML
1000 INJECTION, SOLUTION INTRAVENOUS
Refills: 0 | Status: DISCONTINUED | OUTPATIENT
Start: 2020-08-28 | End: 2020-08-28

## 2020-08-28 RX ORDER — BENZOCAINE AND MENTHOL 5; 1 G/100ML; G/100ML
1 LIQUID ORAL ONCE
Refills: 0 | Status: COMPLETED | OUTPATIENT
Start: 2020-08-28 | End: 2020-08-29

## 2020-08-28 RX ORDER — SODIUM CHLORIDE 9 MG/ML
1000 INJECTION, SOLUTION INTRAVENOUS ONCE
Refills: 0 | Status: COMPLETED | OUTPATIENT
Start: 2020-08-28 | End: 2020-08-28

## 2020-08-28 RX ORDER — PANTOPRAZOLE SODIUM 20 MG/1
40 TABLET, DELAYED RELEASE ORAL DAILY
Refills: 0 | Status: DISCONTINUED | OUTPATIENT
Start: 2020-08-28 | End: 2020-08-31

## 2020-08-28 RX ORDER — LIDOCAINE 4 G/100G
10 CREAM TOPICAL ONCE
Refills: 0 | Status: COMPLETED | OUTPATIENT
Start: 2020-08-28 | End: 2020-08-28

## 2020-08-28 RX ORDER — MORPHINE SULFATE 50 MG/1
4 CAPSULE, EXTENDED RELEASE ORAL ONCE
Refills: 0 | Status: DISCONTINUED | OUTPATIENT
Start: 2020-08-28 | End: 2020-08-28

## 2020-08-28 RX ORDER — BENZOCAINE 10 %
1 GEL (GRAM) MUCOUS MEMBRANE ONCE
Refills: 0 | Status: COMPLETED | OUTPATIENT
Start: 2020-08-28 | End: 2020-08-28

## 2020-08-28 RX ORDER — GABAPENTIN 400 MG/1
1 CAPSULE ORAL
Qty: 0 | Refills: 0 | DISCHARGE

## 2020-08-28 RX ORDER — ONDANSETRON 8 MG/1
4 TABLET, FILM COATED ORAL ONCE
Refills: 0 | Status: COMPLETED | OUTPATIENT
Start: 2020-08-28 | End: 2020-08-28

## 2020-08-28 RX ORDER — HEPARIN SODIUM 5000 [USP'U]/ML
5000 INJECTION INTRAVENOUS; SUBCUTANEOUS EVERY 8 HOURS
Refills: 0 | Status: DISCONTINUED | OUTPATIENT
Start: 2020-08-28 | End: 2020-08-31

## 2020-08-28 RX ORDER — DEXTROSE MONOHYDRATE, SODIUM CHLORIDE, AND POTASSIUM CHLORIDE 50; .745; 4.5 G/1000ML; G/1000ML; G/1000ML
1000 INJECTION, SOLUTION INTRAVENOUS
Refills: 0 | Status: DISCONTINUED | OUTPATIENT
Start: 2020-08-28 | End: 2020-08-31

## 2020-08-28 RX ORDER — ACETAMINOPHEN 500 MG
1000 TABLET ORAL ONCE
Refills: 0 | Status: COMPLETED | OUTPATIENT
Start: 2020-08-28 | End: 2020-08-28

## 2020-08-28 RX ORDER — MORPHINE SULFATE 50 MG/1
2 CAPSULE, EXTENDED RELEASE ORAL ONCE
Refills: 0 | Status: DISCONTINUED | OUTPATIENT
Start: 2020-08-28 | End: 2020-08-28

## 2020-08-28 RX ADMIN — SODIUM CHLORIDE 1000 MILLILITER(S): 9 INJECTION, SOLUTION INTRAVENOUS at 01:40

## 2020-08-28 RX ADMIN — Medication 400 MILLIGRAM(S): at 08:39

## 2020-08-28 RX ADMIN — SODIUM CHLORIDE 50 MILLILITER(S): 9 INJECTION, SOLUTION INTRAVENOUS at 04:24

## 2020-08-28 RX ADMIN — MORPHINE SULFATE 2 MILLIGRAM(S): 50 CAPSULE, EXTENDED RELEASE ORAL at 01:57

## 2020-08-28 RX ADMIN — MORPHINE SULFATE 4 MILLIGRAM(S): 50 CAPSULE, EXTENDED RELEASE ORAL at 03:34

## 2020-08-28 RX ADMIN — SODIUM CHLORIDE 1000 MILLILITER(S): 9 INJECTION, SOLUTION INTRAVENOUS at 04:18

## 2020-08-28 RX ADMIN — HEPARIN SODIUM 5000 UNIT(S): 5000 INJECTION INTRAVENOUS; SUBCUTANEOUS at 21:47

## 2020-08-28 RX ADMIN — MORPHINE SULFATE 2 MILLIGRAM(S): 50 CAPSULE, EXTENDED RELEASE ORAL at 03:10

## 2020-08-28 RX ADMIN — MORPHINE SULFATE 4 MILLIGRAM(S): 50 CAPSULE, EXTENDED RELEASE ORAL at 04:18

## 2020-08-28 RX ADMIN — LIDOCAINE 10 MILLILITER(S): 4 CREAM TOPICAL at 04:18

## 2020-08-28 RX ADMIN — HEPARIN SODIUM 5000 UNIT(S): 5000 INJECTION INTRAVENOUS; SUBCUTANEOUS at 15:39

## 2020-08-28 RX ADMIN — Medication 1000 MILLIGRAM(S): at 09:00

## 2020-08-28 RX ADMIN — ONDANSETRON 4 MILLIGRAM(S): 8 TABLET, FILM COATED ORAL at 01:40

## 2020-08-28 RX ADMIN — Medication 400 MILLIGRAM(S): at 17:00

## 2020-08-28 NOTE — PHYSICAL THERAPY INITIAL EVALUATION ADULT - CRITERIA FOR SKILLED THERAPEUTIC INTERVENTIONS
functional limitations in following categories/impairments found/risk reduction/prevention/anticipated discharge recommendation/anticipated equipment needs at discharge/therapy frequency/predicted duration of therapy intervention

## 2020-08-28 NOTE — PROGRESS NOTE ADULT - ATTENDING COMMENTS
Pt seen and examined.  Chart reviewed.  Resident note confirmed.  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 Pt seen and examined.  Chart reviewed.  Resident note confirmed.  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

## 2020-08-28 NOTE — ED PROVIDER NOTE - CLINICAL SUMMARY MEDICAL DECISION MAKING FREE TEXT BOX
Turner Forrester MD. pt with multiple surgeries including previous SBO, hernia repairs, presenting for abd distention and pain sinc today. passed flatus yesterday. small bm yesterday. abd distendeed, tender diffsuely. concern for sbo. will obtain labs, ct, reassess.

## 2020-08-28 NOTE — PROGRESS NOTE ADULT - ASSESSMENT
89M w/ extensive cardiac history and recurrent SBO p/w abdominal pain, found to have SBO with TP at enteroenteral anastomosis in RLQ    PLAN:  - NPO/NGT/VF  - Serial abdominal exam; no pain medication before exam   - OOB/Ambulate/PT  - Will reposition NGT- appears in too far on CXR   - Await return of bowel function    Christy Leong PA-C [id number]

## 2020-08-28 NOTE — PATIENT PROFILE ADULT - NSPRESCRALCFREQ_GEN_A_NUR
YEARLY PHYSICAL    Date of service: 2020    Manny Woodward  Is a 52 y.o.  single female    PT's PCP is: LORENZO Escalera - CNP     : 1972                                             Subjective:       Patient's last menstrual period was 2020 (approximate). Are your menses regular: no    OB History   No obstetric history on file. Social History     Tobacco Use   Smoking Status Former Smoker    Packs/day: 0.50    Years: 15.00    Pack years: 7.50    Types: Cigarettes   Smokeless Tobacco Never Used        Social History     Substance and Sexual Activity   Alcohol Use Yes    Comment: socially       Family History   Problem Relation Age of Onset    Cancer Father         throat    Stroke Other         grandpa    High Blood Pressure Other         grandpa and aunt    Asthma Other         aunt and grandfather    Heart Disease Other         grandpa       Allergies: Celebrex [celecoxib] and Cymbalta [duloxetine hcl]      Current Outpatient Medications:     Na Sulfate-K Sulfate-Mg Sulf (SUPREP BOWEL PREP KIT) 17.5-3.13-1.6 GM/177ML SOLN, Take as directed, Disp: 2 Bottle, Rfl: 0    vitamin D (ERGOCALCIFEROL) 1.25 MG (48997 UT) CAPS capsule, Take by mouth, Disp: , Rfl:     SUMAtriptan (IMITREX) 50 MG tablet, SUMAtriptan SUMAtriptan Succinate 50 MG Oral Tablet 2019 Provider: Jalil Santoyo CNP 2019 [facility] 87 ([id number]), Disp: , Rfl:     ibuprofen (ADVIL;MOTRIN) 800 MG tablet, Take 1 tablet by mouth every 8 hours as needed for Pain, Disp: 30 tablet, Rfl: 0    acetaminophen (TYLENOL) 500 MG tablet, Take 500 mg by mouth every 6 hours as needed for Pain, Disp: , Rfl:     gabapentin (NEURONTIN) 800 MG tablet, 3 times daily. , Disp: , Rfl: 2    albuterol (PROAIR HFA) 108 (90 BASE) MCG/ACT inhaler, INHALE 2 PUFFS INTO THE LUNGS EVERY 6 HOURS AS NEEDED FOR WHEEZING.  (Patient taking Never

## 2020-08-28 NOTE — H&P ADULT - HISTORY OF PRESENT ILLNESS
Patient is a 89y old  Male who presents with a chief complaint of abdominal pain    HPI:   89M w/ hx of CABG, CAD, recurrent SBO s/p SBR for bezoar, subsequent SBR for adhesive SBO (10+ years ago at [facility]), b/l inguinal hernia repair, presents with abdominal pain and distention x 1 day. Abdominal pain in periumbilical, epigastric area, similar to previous SBO, associated with nausea. Last BM and flatus 1 day ago. Patient denies fevers/chills, denies lightheadedness/dizziness, denies SOB/chest pain, denies nausea/vomiting

## 2020-08-28 NOTE — PATIENT PROFILE ADULT - NSPROPASSIVESMOKEEXPOSURE_GEN_A_NUR
Unknown Hydroxyzine Pregnancy And Lactation Text: This medication is not safe during pregnancy and should not be taken. It is also excreted in breast milk and breast feeding isn't recommended.

## 2020-08-28 NOTE — H&P ADULT - NSHPLABSRESULTS_GEN_ALL_CORE
LABS:                        11.2   7.32  )-----------( 222      ( 28 Aug 2020 01:35 )             35.0     08-28    141  |  106  |  20  ----------------------------<  115<H>  4.1   |  24  |  1.32<H>    Ca    9.0      28 Aug 2020 01:35    TPro  5.7<L>  /  Alb  3.5  /  TBili  0.3  /  DBili  x   /  AST  19  /  ALT  14  /  AlkPhos  79  08-28    PT/INR - ( 28 Aug 2020 01:35 )   PT: 12.2 sec;   INR: 1.03 ratio         PTT - ( 28 Aug 2020 01:35 )  PTT:34.7 sec LABS:                        11.2   7.32  )-----------( 222      ( 28 Aug 2020 01:35 )        < from: CT Abdomen and Pelvis w/ IV Cont (08.28.20 @ 02:42) >    FINDINGS:    LOWER CHEST: Persistent small right and trace left pleural effusion with right pleural enhancement. Round atelectasis at the lung bases again noted. Trace pericardial effusion. Coronary artery calcification.    LIVER: Unremarkable.  GALLBLADDER/BILE DUCTS: No intrahepatic or extrahepatic biliary dilatation. No significant gallbladder edema.  PANCREAS: Diffuse fatty atrophy.  SPLEEN: Unremarkable.    ADRENALS: Unremarkable.  KIDNEYS/URETERS: No hydronephrosis, hydroureter or significant perinephric stranding. Again noted, right > left renal cysts. Subcentimeter hypodense foci in the kidneys, too small to characterize.  BLADDER: Partially distended. Prominent wall.  REPRODUCTIVE ORGANS: Unremarkable.    BOWEL: Again noted, transition point in the distal small bowel loop in the right lower quadrant enteroenteric anastomosis with upstream small bowel dilatation. Heterogeneous structure containing foci of air in the dilated small bowel lumen at the anastomosis. Nonspecific bowel wall thickening proximal and distal to the anastomosis Unremarkable appendix. Mild colon diverticulosis.  PERITONEUM: No drainable fluid collection or free air. Nonspecific mild mesenteric edema and trace free fluid in the right lower quadrant.  VESSELS: Atherosclerosis. Normal caliber of the abdominal aorta.  RETROPERITONEUM: No lymphadenopathy.  ABDOMINAL WALL/SOFT TISSUES: Postsurgical changes along the anterior abdominal and pelvic wall.  BONES: Degenerative changes of the spine. Nonspecific small sclerotic foci in the pelvic bones. Stable grade 1 anterolisthesis of L4 on L5 and L5 on S1. Stable minimal retrolisthesis of L1 on L2.    IMPRESSION:    Small bowel obstruction with a transition point at the enteroenteric anastomosis of the right lower quadrant. Heterogeneous structure containing foci of air in the dilated small bowel lumen at the anastomosis. Bezoar is not entirely excluded, given history. Nonspecific small bowel wall thickening, mesenteric edema and free fluid, which can be seen in the setting of bowel congestion/ischemia and/or enteritis. Recommend clinical correlation.    Prominent bladder wall, which may be due to partial distention. Recommend clinical limited to assess urinary tract infection.      < end of copied text >         35.0     08-28    141  |  106  |  20  ----------------------------<  115<H>  4.1   |  24  |  1.32<H>    Ca    9.0      28 Aug 2020 01:35    TPro  5.7<L>  /  Alb  3.5  /  TBili  0.3  /  DBili  x   /  AST  19  /  ALT  14  /  AlkPhos  79  08-28    PT/INR - ( 28 Aug 2020 01:35 )   PT: 12.2 sec;   INR: 1.03 ratio         PTT - ( 28 Aug 2020 01:35 )  PTT:34.7 sec

## 2020-08-28 NOTE — ED ADULT NURSE REASSESSMENT NOTE - INV PAIN INTERVENTIONS-NUMBER SCALE
single medication modality
multiple medication modalities
multiple medication modalities
single medication modality

## 2020-08-28 NOTE — ED ADULT NURSE NOTE - OBJECTIVE STATEMENT
PT 89 year old male, A/O x3. PT came in via EMS from home due to abdominal pain and distension. PMH- bowel obstructions, HLD, CHF. PT states at 8PM he had sudden onset of intermittent epigastric pain. PT states he also had syncopal episode five days ago when he was getting up to use the rest room. PT also complaining of abdominal distension, constipation, SOB, decrease PO intake and nausea. Skin- warm, dry, intact, bruising and abrasion on right frontal area. Abd. distended, firm , tender to touch. Denies chest pain, fever, chills, vomiting, diarrhea, lightheadedness/ dizziness, numbness/ tingling. Interventions- side rails up, call bell at bedside.

## 2020-08-28 NOTE — PHYSICAL THERAPY INITIAL EVALUATION ADULT - SITTING BALANCE: DYNAMIC
"              After Visit Summary   5/17/2018    Summer Smith    MRN: [id number]           Patient Information     Date Of Birth          1981        Visit Information        Provider Department      5/17/2018 11:00 AM Andrzej Vasquez MD [facility]        Today's Diagnoses     Varicose veins of legs    -  1    May-Thurner syndrome           Follow-ups after your visit        Follow-up notes from your care team     Return in about 4 weeks (around 6/14/2018).      Who to contact     If you have questions or need follow up information about today's clinic visit or your schedule please contact [facility] directly at [phone].  Normal or non-critical lab and imaging results will be communicated to you by MyChart, letter or phone within 4 business days after the clinic has received the results. If you do not hear from us within 7 days, please contact the clinic through sliceXhart or phone. If you have a critical or abnormal lab result, we will notify you by phone as soon as possible.  Submit refill requests through SMB Suite or call your pharmacy and they will forward the refill request to us. Please allow 3 business days for your refill to be completed.          Additional Information About Your Visit        MyChart Information     SMB Suite gives you secure access to your electronic health record. If you see a primary care provider, you can also send messages to your care team and make appointments. If you have questions, please call your primary care clinic.  If you do not have a primary care provider, please call [phone] and they will assist you.        Care EveryWhere ID     This is your Care EveryWhere ID. This could be used by other organizations to access your [facility] medical records  [id number]        Your Vitals Were     Pulse Temperature Height Last Period BMI (Body Mass Index)       66 98.3  F (36.8  C) (Oral) 5' 6\" (1.676 m) 08/01/2016 44.87 kg/m2        Blood " Pressure from Last 3 Encounters:   05/17/18 110/84   05/08/18 (!) 153/115   05/08/18 (!) 138/94    Weight from Last 3 Encounters:   05/17/18 278 lb (126.1 kg)   05/08/18 278 lb (126.1 kg)   05/08/18 279 lb (126.6 kg)              We Performed the Following     D dimer quantitative     Vasculitis panel        Primary Care Provider Office Phone # Fax #    Kalyan Hogan [phone] [address] [facility] [address]        Equal Access to Services     [facility] : Hadii aad ku hadasho Soomaali, waaxda luqadaha, qaybta kaalmada adefercho, jackie tran . So [facility] [phone].    ATENCIÓN: Si habla español, tiene a ames disposición servicios gratuitos de asistencia lingüística. Llame al [phone].    We comply with applicable federal civil rights laws and [place] laws. We do not discriminate on the basis of race, color, national origin, age, disability, sex, sexual orientation, or gender identity.            Thank you!     Thank you for choosing [facility]  for your care. Our goal is always to provide you with excellent care. Hearing back from our patients is one way we can continue to improve our services. Please take a few minutes to complete the written survey that you may receive in the mail after your visit with us. Thank you!             Your Updated Medication List - Protect others around you: Learn how to safely use, store and throw away your medicines at www.disposemymeds.org.          This list is accurate as of 5/17/18 11:51 AM.  Always use your most recent med list.                   Brand Name Dispense Instructions for use Diagnosis    albuterol 108 (90 Base) MCG/ACT Inhaler    PROAIR HFA/PROVENTIL HFA/VENTOLIN HFA    1 Inhaler    Inhale 2 puffs into the lungs every 4 hours as needed    Mild intermittent reactive airway disease without complication       dabigatran ANTICOAGULANT 150 MG capsule    PRADAXA    60 capsule    Take 1 capsule  (150 mg) by mouth 2 times daily Store in original 's bottle or blister pack; use within 120 days of opening. Do not crush or open capsule.        fluticasone 50 MCG/ACT spray    FLONASE    16 g    Spray 1-2 sprays into both nostrils daily    Post-nasal drainage       HYDROcodone-acetaminophen 5-325 MG per tablet    NORCO    9 tablet    Take 1 tablet by mouth every 6 hours as needed for pain        order for DME     1 each    Equipment being ordered: thigh high compression socks 25-35 mm Hg.  Please measure    Personal history of DVT (deep vein thrombosis)       phentermine 15 MG capsule     30 capsule    Take 1 capsule (15 mg) by mouth every morning    Obesity, Class III, BMI 40-49.9 (morbid obesity) (H)       topiramate 50 MG tablet    TOPAMAX    30 tablet    Take 1/2 tab daily for 2 weeks, then increase to full tab.    Obesity, Class III, BMI 40-49.9 (morbid obesity) (H)       Vitamin D3 3000 units Tabs      Take 1 tablet by mouth daily        ZYRTEC ALLERGY PO              good balance

## 2020-08-28 NOTE — PHYSICAL THERAPY INITIAL EVALUATION ADULT - ADDITIONAL COMMENTS
Pt lives alone in an apt on the ground level. Pt amb independently PTA. Pt lives alone in a [facility] with ramp entrance & elevator access. Pt amb independently PTA. Pt owns RW & single axis cane prior to admit.

## 2020-08-28 NOTE — CONSULT NOTE ADULT - SUBJECTIVE AND OBJECTIVE BOX
HPI:   89M w/ hx of CABG, CAD, gerd, HTN, HLD recurrent SBO s/p SBR for bezoar, subsequent SBR for adhesive SBO (10+ years ago at [facility]), b/l inguinal hernia repair, presents with abdominal pain and distention x 1 day. Abdominal pain in periumbilical, epigastric area, similar to previous SBO, associated with nausea. Last BM and flatus 1 day ago. Patient denies fevers/chills, denies lightheadedness/dizziness, denies SOB/chest pain, denies nausea/vomiting (28 Aug 2020 06:05)      PAST MEDICAL & SURGICAL HISTORY:  Gastric motility disorder  Small bowel obstruction due to adhesions  Angina of effort  Hyperlipidemia, unspecified hyperlipidemia type  GERD (gastroesophageal reflux disease)  Hx of benign prostatic hypertrophy  Essential hypertension, benign  CAD (coronary artery disease) of bypass graft  CAD S/P percutaneous coronary angioplasty  S/P CABG x 2  S/P small bowel resection: with subsequent lysis of adhesisions      Review of Systems:   CONSTITUTIONAL: No fever,  EYES: No eye pain,  ENMT:  No difficulty hearing  NECK: No pain or stiffness  RESPIRATORY: No shortness of breath  CARDIOVASCULAR: No chest pain, palpitations, dizziness, or leg swelling  GASTROINTESTINAL: abdominal pain better, no flatus or bm   GENITOURINARY: No dysuria  NEUROLOGICAL: No headaches,  SKIN: No itching, burning, rashes, or lesions   MUSCULOSKELETAL: No joint pain or swelling;   PSYCHIATRIC: No depression,       Allergies    Cipro (Rash)  penicillins (Unknown)    Intolerances        Social History: denies smoking, reports social etoh use     FAMILY HISTORY:  FH: heart disease: Mother and Father      MEDICATIONS  (STANDING):  dextrose 5% + sodium chloride 0.45% with potassium chloride 20 mEq/L 1000 milliLiter(s) (75 mL/Hr) IV Continuous <Continuous>  heparin   Injectable 5000 Unit(s) SubCutaneous every 8 hours  pantoprazole  Injectable 40 milliGRAM(s) IV Push daily    MEDICATIONS  (PRN):        CAPILLARY BLOOD GLUCOSE        I&O's Summary    28 Aug 2020 07:01  -  28 Aug 2020 15:35  --------------------------------------------------------  IN: 0 mL / OUT: 200 mL / NET: -200 mL        PHYSICAL EXAM:  GENERAL: NAD, well-developed, ngt   HEAD:  Atraumatic, Normocephalic  EYES:conjunctiva and sclera clear  NECK: No JVD  CHEST/LUNG: Clear to auscultation bilaterally; No wheeze  HEART: Regular rate and rhythm; S1S2  ABDOMEN: Soft, midline ttp , some distension  Bowel sounds present  EXTREMITIES:  2+ Peripheral Pulses  PSYCH: AAOx3      LABS:                        11.2   7.32  )-----------( 222      ( 28 Aug 2020 01:35 )             35.0     08-28    141  |  106  |  20  ----------------------------<  115<H>  4.1   |  24  |  1.32<H>    Ca    9.0      28 Aug 2020 01:35    TPro  5.7<L>  /  Alb  3.5  /  TBili  0.3  /  DBili  x   /  AST  19  /  ALT  14  /  AlkPhos  79  08-28    PT/INR - ( 28 Aug 2020 01:35 )   PT: 12.2 sec;   INR: 1.03 ratio         PTT - ( 28 Aug 2020 01:35 )  PTT:34.7 sec          RADIOLOGY & ADDITIONAL TESTS:    Imaging Personally Reviewed:    Consultant(s) Notes Reviewed:  sx    Care Discussed with Consultants/Other Providers: sx

## 2020-08-28 NOTE — ED PROVIDER NOTE - PROGRESS NOTE3
Constitutional:  male, resting  in bed, NAD  HEENT: NC/AT; PERRL, anicteric sclera; no oropharyngeal erythema or exudates; MMM  Neck: supple, no appreciable JVD  Respiratory: decreased breath sounds bilaterally although difficult to auscultate due to body habitus   Cardiovascular: +S1/S2, RRR  Gastrointestinal: obese abdomen soft, NT/ND  Extremities: WWP; no clubbing, cyanosis, 2+ pitting edema up to knee of b/l LE   Vascular: 2+ radial, femoral, and DP/PT pulses B/L  Dermatologic: skin normal color and turgor; no visible rashes  Neurological: AOX3, no focal deficits Constitutional: Resting  in bed, NAD  HEENT: NC/AT; PERRL, anicteric sclera; no oropharyngeal erythema or exudates; MMM  Neck: supple, no appreciable JVD  Respiratory: decreased breath sounds bilaterally, difficult to auscultate due to body habitus   Cardiovascular: +S1/S2, RRR  Gastrointestinal: obese abdomen soft, NT/ND  Extremities: WWP; no clubbing, cyanosis, 2+ pitting edema up to knee of b/l LE   Vascular: 2+ radial, femoral, and DP/PT pulses B/L  Dermatologic: skin normal color and turgor; no visible rashes  Neurological: AOX3, no focal deficits Constitutional: Sitting comfortably in bed, NAD  HEENT: NC/AT; PERRL, anicteric sclera; no oropharyngeal erythema or exudates; MMM  Neck: supple, no appreciable JVD  Respiratory: decreased breath sounds bilaterally, difficult to auscultate due to body habitus   Cardiovascular: +S1/S2, RRR  Gastrointestinal: obese abdomen soft, NT/ND  Extremities: WWP; no clubbing, cyanosis, 2+ pitting edema up to knee of b/l LE   Vascular: 2+ radial, femoral, and DP/PT pulses B/L  Dermatologic: skin normal color and turgor; no visible rashes  Neurological: AOX3, no focal deficits Stable.

## 2020-08-28 NOTE — ED PROVIDER NOTE - ATTENDING CONTRIBUTION TO CARE
Pt with abdominal pain, distension, nausea.  Exam: tender, distended, tympanic upper abdomen left and right, no guarding or rebound.  Pain meds, fluids, ct ab r/o SBO (h/o same in past due to adhesions).

## 2020-08-28 NOTE — PHYSICAL THERAPY INITIAL EVALUATION ADULT - PERTINENT HX OF CURRENT PROBLEM, REHAB EVAL
89M w/ hx of CABG, CAD, gerd, HTN, HLD recurrent SBO s/p SBR for bezoar, subsequent SBR for adhesive SBO (10+ years ago at [facility]), b/l inguinal hernia repair, presents with abdominal pain and distention x 1 day. Abdominal pain in periumbilical, epigastric area, similar to previous SBO, associated with nausea.

## 2020-08-28 NOTE — PHYSICAL THERAPY INITIAL EVALUATION ADULT - GAIT DEVIATIONS NOTED, PT EVAL
decreased velocity of limb motion/decreased swing-to-stance ratio/decreased wilfredo/decreased stride length/decreased weight-shifting ability/decreased step length

## 2020-08-28 NOTE — ED ADULT NURSE REASSESSMENT NOTE - NS ED NURSE REASSESS COMMENT FT1
Surgery resident at bedside. No gastric content in canister.
PT states abdominal pain subsided with morphine and abdominal began to increase again. PT able to stand with stand by assist.
PT states abdominal pain increased. Informed PT ED MD needs to read CT scan and reassess PT. Morphine provided. Call bell at bedside.
PT states morphine helped pain decrease. NG placed by ED Resident Turner Forrester- no gastric content exited.
States NG decreased pain. PT NG attached to wall suction- 100 cc clear fluid.

## 2020-08-28 NOTE — PATIENT PROFILE ADULT - NSPROMUTPARTICIPCAREFT_GEN_A_NUR
N/A Simponi Counseling:  I discussed with the patient the risks of golimumab including but not limited to myelosuppression, immunosuppression, autoimmune hepatitis, demyelinating diseases, lymphoma, and serious infections.  The patient understands that monitoring is required including a PPD at baseline and must alert us or the primary physician if symptoms of infection or other concerning signs are noted.

## 2020-08-28 NOTE — ED PROVIDER NOTE - PHYSICAL EXAMINATION
PHYSICAL EXAM:  GENERAL: non-toxic appearing; in no respiratory distress  HEAD Atraumatic, Normocephalic  NECK: No JVD; FROM  EYES: PERRL, EOMs intact b/l w/out deficits  CHEST/LUNG: CTAB no wheezes/rhonchi/rales  HEART: RRR no murmur/gallops/rubs  ABDOMEN: +BS, soft, tender diffusely, distended; no rebound or guarding/;   EXTREMITIES: No LE edema, +2 radial pulses b/l, +2 DP/PT pulses b/l  MUSCULOSKELETAL: FROM of all 4 extremities;  NERVOUS SYSTEM:  A&Ox3, No motor deficits or sensory deficits; CNII-XII intact; no focal neurologic deficits  SKIN:  No new rashes

## 2020-08-28 NOTE — CONSULT NOTE ADULT - PROBLEM SELECTOR RECOMMENDATION 9
Found to have SBO with TP at enteroenteral anastomosis in RLQ  Keep NPO  C/w IVF  C/w NGT   Assess bowel function

## 2020-08-28 NOTE — ED PROVIDER NOTE - PROGRESS NOTE DETAILS
Dr. Smith Note: ct showing SBO, surgeon paged for consult, to place NGt, likely for admission. Turner Forrester MD. ngt placed, see proc note. accepted to surg for sbo

## 2020-08-28 NOTE — ED PROVIDER NOTE - NS ED ROS FT
Constitutional: no fevers; no chills  HEENT: no visual changes, no sore throat, no rhinorrhea  CV: no cp; no palpitations  Resp: no sob; no cough  GI: abd pain, nausea, no vomiting, no diarrhea, constipation  : no dysuria, no hematuria  MSK: no myalgais; no arthralgias  skin: no rashes  neuro: no HA, no numbness; no weakness, no tingling  ROS statement: all other ROS negative except as per HPI

## 2020-08-28 NOTE — H&P ADULT - ASSESSMENT
89M w/ extensive cardiac history and recurrent SBO p/w abdominal pain, found to have SBO with TP at enteroenteral anastomosis in RLQ    PLAN:  - Admit to surgery, Dr. Flores  - bowel rest  - NPO/ IVF  - NGT  - Plan to be discussed with Attending, Dr. Flores    [id number] 89M w/ extensive cardiac history and recurrent SBO p/w abdominal pain, found to have SBO with TP at enteroenteral anastomosis in RLQ    PLAN:  - Admit to surgery, Dr. Flores  - bowel rest  - NPO/ IVF  - NGT  - Serial abdominal exam; no pain medication before exam   - Plan to be discussed with Attending, Dr. Flores    [id number]

## 2020-08-28 NOTE — H&P ADULT - NSHPPHYSICALEXAM_GEN_ALL_CORE
Vital Signs Last 24 Hrs  T(C): 37.3 (28 Aug 2020 05:43), Max: 37.3 (28 Aug 2020 05:43)  T(F): 99.1 (28 Aug 2020 05:43), Max: 99.1 (28 Aug 2020 05:43)  HR: 92 (28 Aug 2020 05:43) (80 - 92)  BP: 137/78 (28 Aug 2020 05:43) (127/86 - 155/74)  BP(mean): 97 (28 Aug 2020 05:43) (97 - 99)  RR: 18 (28 Aug 2020 05:43) (18 - 20)  SpO2: 96% (28 Aug 2020 05:43) (96% - 100%)    Exam:  Gen: NAD, resting in bed, alert and responding appropriately  Resp: Airway patent, non-labored respirations  Abd: Soft, ND, mild periumbilical TTP, no rebound or guarding. prior midline surgical scar well-healed  Ext: No edema, WWP  Neuro: AAOx3, no focal deficits

## 2020-08-28 NOTE — ED ADULT NURSE NOTE - NSIMPLEMENTINTERV_GEN_ALL_ED
Implemented All Fall with Harm Risk Interventions:  [place] to call system. Call bell, personal items and telephone within reach. Instruct patient to call for assistance. Room bathroom lighting operational. Non-slip footwear when patient is off stretcher. Physically safe environment: no spills, clutter or unnecessary equipment. Stretcher in lowest position, wheels locked, appropriate side rails in place. Provide visual cue, wrist band, yellow gown, etc. Monitor gait and stability. Monitor for mental status changes and reorient to person, place, and time. Review medications for side effects contributing to fall risk. Reinforce activity limits and safety measures with patient and family. Provide visual clues: red socks.

## 2020-08-28 NOTE — ED PROVIDER NOTE - OBJECTIVE STATEMENT
89 yo M PMHx CABG, CAD, GERD, BPH, SBO, presents to ED for abd distention and pain since today. feels nauseous too. last BM was yesterday and states it was very minimal. not passing flatus since yesterday. Denies vomiting, fever, cough, cp, sob, dysuria, dizziness, or palpitations. 89 yo M PMHx CABG, CAD, GERD, BPH, SBO, inguinal hernia repairs remotely, presents to ED for abd distention and pain since today. feels nauseous too. last BM was yesterday and states it was very minimal. not passing flatus since yesterday. Denies vomiting, fever, cough, cp, sob, dysuria, dizziness, or palpitations.

## 2020-08-28 NOTE — CONSULT NOTE ADULT - ASSESSMENT
89M w/ hx of CABG, CAD, gerd, HLD BPH, recurrent SBO s/p SBR for bezoar, subsequent SBR for adhesive SBO (10+ years ago at [facility]), b/l inguinal hernia repair, presents with abdominal pain and distention x 1 day.  Found to have SBO with TP at enteroenteral anastomosis in RLQ

## 2020-08-29 LAB
ANION GAP SERPL CALC-SCNC: 8 MMOL/L — SIGNIFICANT CHANGE UP (ref 5–17)
ANION GAP SERPL CALC-SCNC: 8 MMOL/L — SIGNIFICANT CHANGE UP (ref 5–17)
BUN SERPL-MCNC: 10 MG/DL — SIGNIFICANT CHANGE UP (ref 7–23)
BUN SERPL-MCNC: 11 MG/DL — SIGNIFICANT CHANGE UP (ref 7–23)
CALCIUM SERPL-MCNC: 8 MG/DL — LOW (ref 8.4–10.5)
CALCIUM SERPL-MCNC: 8.2 MG/DL — LOW (ref 8.4–10.5)
CHLORIDE SERPL-SCNC: 102 MMOL/L — SIGNIFICANT CHANGE UP (ref 96–108)
CHLORIDE SERPL-SCNC: 104 MMOL/L — SIGNIFICANT CHANGE UP (ref 96–108)
CO2 SERPL-SCNC: 24 MMOL/L — SIGNIFICANT CHANGE UP (ref 22–31)
CO2 SERPL-SCNC: 24 MMOL/L — SIGNIFICANT CHANGE UP (ref 22–31)
CREAT SERPL-MCNC: 1.01 MG/DL — SIGNIFICANT CHANGE UP (ref 0.5–1.3)
CREAT SERPL-MCNC: 1.09 MG/DL — SIGNIFICANT CHANGE UP (ref 0.5–1.3)
GLUCOSE SERPL-MCNC: 358 MG/DL — HIGH (ref 70–99)
GLUCOSE SERPL-MCNC: 91 MG/DL — SIGNIFICANT CHANGE UP (ref 70–99)
HCT VFR BLD CALC: 26.9 % — LOW (ref 39–50)
HGB BLD-MCNC: 8.1 G/DL — LOW (ref 13–17)
MAGNESIUM SERPL-MCNC: 1.7 MG/DL — SIGNIFICANT CHANGE UP (ref 1.6–2.6)
MCHC RBC-ENTMCNC: 30.1 GM/DL — LOW (ref 32–36)
MCHC RBC-ENTMCNC: 31.9 PG — SIGNIFICANT CHANGE UP (ref 27–34)
MCV RBC AUTO: 105.9 FL — HIGH (ref 80–100)
NRBC # BLD: 0 /100 WBCS — SIGNIFICANT CHANGE UP (ref 0–0)
PHOSPHATE SERPL-MCNC: 2.5 MG/DL — SIGNIFICANT CHANGE UP (ref 2.5–4.5)
PLATELET # BLD AUTO: 160 K/UL — SIGNIFICANT CHANGE UP (ref 150–400)
POTASSIUM SERPL-MCNC: 4.4 MMOL/L — SIGNIFICANT CHANGE UP (ref 3.5–5.3)
POTASSIUM SERPL-MCNC: 5.5 MMOL/L — HIGH (ref 3.5–5.3)
POTASSIUM SERPL-SCNC: 4.4 MMOL/L — SIGNIFICANT CHANGE UP (ref 3.5–5.3)
POTASSIUM SERPL-SCNC: 5.5 MMOL/L — HIGH (ref 3.5–5.3)
RBC # BLD: 2.54 M/UL — LOW (ref 4.2–5.8)
RBC # FLD: 14.1 % — SIGNIFICANT CHANGE UP (ref 10.3–14.5)
SODIUM SERPL-SCNC: 134 MMOL/L — LOW (ref 135–145)
SODIUM SERPL-SCNC: 136 MMOL/L — SIGNIFICANT CHANGE UP (ref 135–145)
WBC # BLD: 4.49 K/UL — SIGNIFICANT CHANGE UP (ref 3.8–10.5)
WBC # FLD AUTO: 4.49 K/UL — SIGNIFICANT CHANGE UP (ref 3.8–10.5)

## 2020-08-29 PROCEDURE — 99232 SBSQ HOSP IP/OBS MODERATE 35: CPT

## 2020-08-29 RX ORDER — SODIUM,POTASSIUM PHOSPHATES 278-250MG
1 POWDER IN PACKET (EA) ORAL ONCE
Refills: 0 | Status: DISCONTINUED | OUTPATIENT
Start: 2020-08-29 | End: 2020-08-29

## 2020-08-29 RX ORDER — TETRACAINE/BENZOCAINE/BUTAMBEN 2%-14%-2%
1 OINTMENT (GRAM) TOPICAL ONCE
Refills: 0 | Status: COMPLETED | OUTPATIENT
Start: 2020-08-29 | End: 2020-08-29

## 2020-08-29 RX ORDER — POTASSIUM PHOSPHATE, MONOBASIC POTASSIUM PHOSPHATE, DIBASIC 236; 224 MG/ML; MG/ML
15 INJECTION, SOLUTION INTRAVENOUS ONCE
Refills: 0 | Status: COMPLETED | OUTPATIENT
Start: 2020-08-29 | End: 2020-08-29

## 2020-08-29 RX ORDER — TETRACAINE/BENZOCAINE/BUTAMBEN 2%-14%-2%
1 OINTMENT (GRAM) TOPICAL EVERY 4 HOURS
Refills: 0 | Status: DISCONTINUED | OUTPATIENT
Start: 2020-08-29 | End: 2020-08-31

## 2020-08-29 RX ORDER — ACETAMINOPHEN 500 MG
1000 TABLET ORAL ONCE
Refills: 0 | Status: COMPLETED | OUTPATIENT
Start: 2020-08-29 | End: 2020-08-29

## 2020-08-29 RX ORDER — MAGNESIUM SULFATE 500 MG/ML
1 VIAL (ML) INJECTION ONCE
Refills: 0 | Status: COMPLETED | OUTPATIENT
Start: 2020-08-29 | End: 2020-08-29

## 2020-08-29 RX ORDER — BENZOCAINE AND MENTHOL 5; 1 G/100ML; G/100ML
1 LIQUID ORAL DAILY
Refills: 0 | Status: DISCONTINUED | OUTPATIENT
Start: 2020-08-29 | End: 2020-08-31

## 2020-08-29 RX ADMIN — Medication 400 MILLIGRAM(S): at 08:53

## 2020-08-29 RX ADMIN — HEPARIN SODIUM 5000 UNIT(S): 5000 INJECTION INTRAVENOUS; SUBCUTANEOUS at 14:20

## 2020-08-29 RX ADMIN — DEXTROSE MONOHYDRATE, SODIUM CHLORIDE, AND POTASSIUM CHLORIDE 75 MILLILITER(S): 50; .745; 4.5 INJECTION, SOLUTION INTRAVENOUS at 08:30

## 2020-08-29 RX ADMIN — Medication 1000 MILLIGRAM(S): at 09:23

## 2020-08-29 RX ADMIN — Medication 400 MILLIGRAM(S): at 20:59

## 2020-08-29 RX ADMIN — PANTOPRAZOLE SODIUM 40 MILLIGRAM(S): 20 TABLET, DELAYED RELEASE ORAL at 12:16

## 2020-08-29 RX ADMIN — Medication 1 SPRAY(S): at 14:21

## 2020-08-29 RX ADMIN — BENZOCAINE AND MENTHOL 1 LOZENGE: 5; 1 LIQUID ORAL at 12:16

## 2020-08-29 RX ADMIN — HEPARIN SODIUM 5000 UNIT(S): 5000 INJECTION INTRAVENOUS; SUBCUTANEOUS at 21:17

## 2020-08-29 RX ADMIN — POTASSIUM PHOSPHATE, MONOBASIC POTASSIUM PHOSPHATE, DIBASIC 62.5 MILLIMOLE(S): 236; 224 INJECTION, SOLUTION INTRAVENOUS at 10:32

## 2020-08-29 RX ADMIN — Medication 1000 MILLIGRAM(S): at 21:30

## 2020-08-29 RX ADMIN — HEPARIN SODIUM 5000 UNIT(S): 5000 INJECTION INTRAVENOUS; SUBCUTANEOUS at 05:01

## 2020-08-29 RX ADMIN — Medication 1 SPRAY(S): at 10:14

## 2020-08-29 RX ADMIN — BENZOCAINE AND MENTHOL 1 LOZENGE: 5; 1 LIQUID ORAL at 05:03

## 2020-08-29 RX ADMIN — Medication 100 GRAM(S): at 10:14

## 2020-08-29 NOTE — CHART NOTE - NSCHARTNOTEFT_GEN_A_CORE
Patient seen at bedside this evening for serial abdominal exam. Reports that he is feeling well, with no abdominal pain but continued pain in his throat. Denies nausea, vomiting, chest pain, and SOB. Has been passing flatus and has still not had bowel movements today. Reports that tylenol makes his throat pain better and would like to receive it.     On exam, abdomen is soft, mildly distended, and non-tender to palpation without rebound tenderness or guarding. NGT in place, functioning well on LCWS with bilious OP.     ASSESSMENT:   89M w/ extensive cardiac history and recurrent SBO p/w abdominal pain, found to have SBO with TP at enteroenteral anastomosis in RLQ. Clinically stable on floors, passing flatus but still having OP on NGT. Continues to report that his NGT is very irritating to his throat and would like pain medication.     PLAN:   - NPO/NGT/IVF  - OOB/Ambulate  - DVT ppx  - C/w serial abd exams  - IV Tylenol for throat pain      Mere Ryan, PGY-1  ATP  [phone]

## 2020-08-29 NOTE — PROGRESS NOTE ADULT - ATTENDING COMMENTS
Pt seen and examined.  Chart reviewed.  Resident note confirmed.  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

## 2020-08-29 NOTE — CHART NOTE - NSCHARTNOTEFT_GEN_A_CORE
Patient seen at bedside this afternoon for serial abdominal exam. Reports that he is feeling well. Denies nausea, vomiting, chest pain, and SOB. Has been passing flatus and has not had bowel movements today. Continues to report discomfort in his throat from NGT. He is frustrated that his NGT is leaking at the blue sump tube.     On exam, abdomen is soft, mildly distended, and mildly tender to palpation franklin-umbilically without rebound tenderness or guarding. NGT in place, functioning well on LCWS with bilious OP. Some leakage from blue sump tube.     ASSESSMENT:   89M w/ extensive cardiac history and recurrent SBO p/w abdominal pain, found to have SBO with TP at enteroenteral anastomosis in RLQ. Clinically stable on floors, passing flatus but still having high NGT OP.     PLAN:   - NPO/NGT/IVF  - OOB/Ambulate  - DVT ppx  - C/w serial abd exams      Mere Ryan, PGY-1  ATP  [id number]

## 2020-08-30 LAB
ANION GAP SERPL CALC-SCNC: 12 MMOL/L — SIGNIFICANT CHANGE UP (ref 5–17)
APTT BLD: 31.8 SEC — SIGNIFICANT CHANGE UP (ref 27.5–35.5)
BUN SERPL-MCNC: 9 MG/DL — SIGNIFICANT CHANGE UP (ref 7–23)
CALCIUM SERPL-MCNC: 8.9 MG/DL — SIGNIFICANT CHANGE UP (ref 8.4–10.5)
CHLORIDE SERPL-SCNC: 104 MMOL/L — SIGNIFICANT CHANGE UP (ref 96–108)
CO2 SERPL-SCNC: 24 MMOL/L — SIGNIFICANT CHANGE UP (ref 22–31)
CREAT SERPL-MCNC: 1.14 MG/DL — SIGNIFICANT CHANGE UP (ref 0.5–1.3)
GLUCOSE SERPL-MCNC: 110 MG/DL — HIGH (ref 70–99)
HCT VFR BLD CALC: 35.8 % — LOW (ref 39–50)
HGB BLD-MCNC: 11.4 G/DL — LOW (ref 13–17)
INR BLD: 1.09 RATIO — SIGNIFICANT CHANGE UP (ref 0.88–1.16)
LACTATE BLDV-MCNC: 1 MMOL/L — SIGNIFICANT CHANGE UP (ref 0.7–2)
MAGNESIUM SERPL-MCNC: 2.3 MG/DL — SIGNIFICANT CHANGE UP (ref 1.6–2.6)
MCHC RBC-ENTMCNC: 31.4 PG — SIGNIFICANT CHANGE UP (ref 27–34)
MCHC RBC-ENTMCNC: 31.8 GM/DL — LOW (ref 32–36)
MCV RBC AUTO: 98.6 FL — SIGNIFICANT CHANGE UP (ref 80–100)
NRBC # BLD: 0 /100 WBCS — SIGNIFICANT CHANGE UP (ref 0–0)
PHOSPHATE SERPL-MCNC: 3 MG/DL — SIGNIFICANT CHANGE UP (ref 2.5–4.5)
PLATELET # BLD AUTO: 210 K/UL — SIGNIFICANT CHANGE UP (ref 150–400)
POTASSIUM SERPL-MCNC: 4.6 MMOL/L — SIGNIFICANT CHANGE UP (ref 3.5–5.3)
POTASSIUM SERPL-SCNC: 4.6 MMOL/L — SIGNIFICANT CHANGE UP (ref 3.5–5.3)
PROTHROM AB SERPL-ACNC: 12.8 SEC — SIGNIFICANT CHANGE UP (ref 10.6–13.6)
RBC # BLD: 3.63 M/UL — LOW (ref 4.2–5.8)
RBC # FLD: 13.4 % — SIGNIFICANT CHANGE UP (ref 10.3–14.5)
SODIUM SERPL-SCNC: 140 MMOL/L — SIGNIFICANT CHANGE UP (ref 135–145)
WBC # BLD: 7.44 K/UL — SIGNIFICANT CHANGE UP (ref 3.8–10.5)
WBC # FLD AUTO: 7.44 K/UL — SIGNIFICANT CHANGE UP (ref 3.8–10.5)

## 2020-08-30 PROCEDURE — 99232 SBSQ HOSP IP/OBS MODERATE 35: CPT

## 2020-08-30 RX ORDER — ACETAMINOPHEN 500 MG
1000 TABLET ORAL ONCE
Refills: 0 | Status: COMPLETED | OUTPATIENT
Start: 2020-08-30 | End: 2020-08-30

## 2020-08-30 RX ADMIN — Medication 400 MILLIGRAM(S): at 07:55

## 2020-08-30 RX ADMIN — HEPARIN SODIUM 5000 UNIT(S): 5000 INJECTION INTRAVENOUS; SUBCUTANEOUS at 05:09

## 2020-08-30 RX ADMIN — DEXTROSE MONOHYDRATE, SODIUM CHLORIDE, AND POTASSIUM CHLORIDE 75 MILLILITER(S): 50; .745; 4.5 INJECTION, SOLUTION INTRAVENOUS at 06:52

## 2020-08-30 RX ADMIN — DEXTROSE MONOHYDRATE, SODIUM CHLORIDE, AND POTASSIUM CHLORIDE 75 MILLILITER(S): 50; .745; 4.5 INJECTION, SOLUTION INTRAVENOUS at 21:10

## 2020-08-30 RX ADMIN — Medication 1000 MILLIGRAM(S): at 08:25

## 2020-08-30 RX ADMIN — PANTOPRAZOLE SODIUM 40 MILLIGRAM(S): 20 TABLET, DELAYED RELEASE ORAL at 13:02

## 2020-08-30 RX ADMIN — HEPARIN SODIUM 5000 UNIT(S): 5000 INJECTION INTRAVENOUS; SUBCUTANEOUS at 13:02

## 2020-08-30 RX ADMIN — HEPARIN SODIUM 5000 UNIT(S): 5000 INJECTION INTRAVENOUS; SUBCUTANEOUS at 21:10

## 2020-08-30 NOTE — PROGRESS NOTE ADULT - ASSESSMENT
Assessment:   89M w/ extensive cardiac history and recurrent SBO p/w abdominal pain, found to have SBO with TP at enteroenteral anastomosis in RLQ. Clinically stable on floors, passing flatus but still having high NGT OP.     Plan:   - NPO/IVF  - NGT to LCWS  - Continue to monitor NGT OP  - Serial abdominal exam  - No standing pain medication  - Physical exam prior to pain medication   - Consult Adv. GI for possible stricture   - f/u venous lactate      ATP  [id number]

## 2020-08-30 NOTE — CHART NOTE - NSCHARTNOTEFT_GEN_A_CORE
Patient seen at bedside this AM for serial abdominal exam. Reports that he is not feeling great. He is having a lot of throat pain. Denies nausea, vomiting, chest pain, and SOB. Has been passing flatus and has not had bowel movements today.     On exam, abdomen is soft, mildly distended, and non-tender to palpation without rebound tenderness or guarding. NGT in place, functioning well on LCWS with bilious OP.     ASSESSMENT:   89M w/ extensive cardiac history and recurrent SBO p/w abdominal pain, found to have SBO with TP at enteroenteral anastomosis in RLQ. Clinically stable on floors, passing flatus but still having OP on NGT. Continues to report that his NGT is very irritating to his throat and would like pain medication.     PLAN:   - NPO/NGT/IVF  - OOB/Ambulate  - DVT ppx  - C/w serial abd exams  - IV Tylenol for throat pain      Mere Ryan, PGY-1  ATP  [id number]

## 2020-08-30 NOTE — PROGRESS NOTE ADULT - ATTENDING COMMENTS
Pt seen and examined.  Chart reviewed.  Resident note confirmed.  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 NGT repositioned.  Continue NPO/NGT  Stricture is in distal ileum; Adv GI consult to evaluate for dilation  Replace lytes  Continue supportive care.

## 2020-08-30 NOTE — CHART NOTE - NSCHARTNOTEFT_GEN_A_CORE
Patient examined after NGT reported to be out by Nurse. Patient denies any complaints at this time. Pt reexamined by Intern noted to be soft, minimally distended, non tender.     GEN: resting in bed comfortably in NAD  RESP: no increased WOB  ABD: soft, minimally distended, non tender to palpation around umbilicus without rebound tenderness or guarding  EXTR: warm, well-perfused without gross deformities; spontaneous movement in b/l U/L extrem  NEURO: AAOx4      Plan to observe without NGT tube. If patient becomes nauseous/vomiting plan to replace NGT.

## 2020-08-30 NOTE — PROVIDER CONTACT NOTE (OTHER) - ASSESSMENT
Pt right IV infiltrated. Redness and swelling present at site. Pt reports no pain. All VSS. No other symptoms at site.
NGT discomfort

## 2020-08-31 LAB
ANION GAP SERPL CALC-SCNC: 10 MMOL/L — SIGNIFICANT CHANGE UP (ref 5–17)
APTT BLD: 32 SEC — SIGNIFICANT CHANGE UP (ref 27.5–35.5)
BUN SERPL-MCNC: 8 MG/DL — SIGNIFICANT CHANGE UP (ref 7–23)
CALCIUM SERPL-MCNC: 8.4 MG/DL — SIGNIFICANT CHANGE UP (ref 8.4–10.5)
CHLORIDE SERPL-SCNC: 105 MMOL/L — SIGNIFICANT CHANGE UP (ref 96–108)
CO2 SERPL-SCNC: 21 MMOL/L — LOW (ref 22–31)
CREAT SERPL-MCNC: 1.03 MG/DL — SIGNIFICANT CHANGE UP (ref 0.5–1.3)
GLUCOSE SERPL-MCNC: 108 MG/DL — HIGH (ref 70–99)
HCT VFR BLD CALC: 33.7 % — LOW (ref 39–50)
HGB BLD-MCNC: 10.6 G/DL — LOW (ref 13–17)
INR BLD: 1.12 RATIO — SIGNIFICANT CHANGE UP (ref 0.88–1.16)
MAGNESIUM SERPL-MCNC: 2.1 MG/DL — SIGNIFICANT CHANGE UP (ref 1.6–2.6)
MCHC RBC-ENTMCNC: 31.1 PG — SIGNIFICANT CHANGE UP (ref 27–34)
MCHC RBC-ENTMCNC: 31.5 GM/DL — LOW (ref 32–36)
MCV RBC AUTO: 98.8 FL — SIGNIFICANT CHANGE UP (ref 80–100)
NRBC # BLD: 0 /100 WBCS — SIGNIFICANT CHANGE UP (ref 0–0)
PHOSPHATE SERPL-MCNC: 2.6 MG/DL — SIGNIFICANT CHANGE UP (ref 2.5–4.5)
PLATELET # BLD AUTO: 208 K/UL — SIGNIFICANT CHANGE UP (ref 150–400)
POTASSIUM SERPL-MCNC: 4.2 MMOL/L — SIGNIFICANT CHANGE UP (ref 3.5–5.3)
POTASSIUM SERPL-SCNC: 4.2 MMOL/L — SIGNIFICANT CHANGE UP (ref 3.5–5.3)
PROTHROM AB SERPL-ACNC: 13.2 SEC — SIGNIFICANT CHANGE UP (ref 10.6–13.6)
RBC # BLD: 3.41 M/UL — LOW (ref 4.2–5.8)
RBC # FLD: 13.4 % — SIGNIFICANT CHANGE UP (ref 10.3–14.5)
SODIUM SERPL-SCNC: 136 MMOL/L — SIGNIFICANT CHANGE UP (ref 135–145)
WBC # BLD: 5.42 K/UL — SIGNIFICANT CHANGE UP (ref 3.8–10.5)
WBC # FLD AUTO: 5.42 K/UL — SIGNIFICANT CHANGE UP (ref 3.8–10.5)

## 2020-08-31 PROCEDURE — 99222 1ST HOSP IP/OBS MODERATE 55: CPT | Mod: GC

## 2020-08-31 PROCEDURE — 99222 1ST HOSP IP/OBS MODERATE 55: CPT

## 2020-08-31 PROCEDURE — 99232 SBSQ HOSP IP/OBS MODERATE 35: CPT

## 2020-08-31 RX ORDER — ENOXAPARIN SODIUM 100 MG/ML
40 INJECTION SUBCUTANEOUS DAILY
Refills: 0 | Status: DISCONTINUED | OUTPATIENT
Start: 2020-09-01 | End: 2020-09-01

## 2020-08-31 RX ORDER — ATORVASTATIN CALCIUM 80 MG/1
10 TABLET, FILM COATED ORAL AT BEDTIME
Refills: 0 | Status: DISCONTINUED | OUTPATIENT
Start: 2020-08-31 | End: 2020-09-01

## 2020-08-31 RX ORDER — ENOXAPARIN SODIUM 100 MG/ML
40 INJECTION SUBCUTANEOUS DAILY
Refills: 0 | Status: DISCONTINUED | OUTPATIENT
Start: 2020-08-31 | End: 2020-08-31

## 2020-08-31 RX ORDER — PANTOPRAZOLE SODIUM 20 MG/1
40 TABLET, DELAYED RELEASE ORAL
Refills: 0 | Status: DISCONTINUED | OUTPATIENT
Start: 2020-08-31 | End: 2020-09-01

## 2020-08-31 RX ORDER — RANOLAZINE 500 MG/1
500 TABLET, FILM COATED, EXTENDED RELEASE ORAL
Refills: 0 | Status: DISCONTINUED | OUTPATIENT
Start: 2020-08-31 | End: 2020-09-01

## 2020-08-31 RX ORDER — FUROSEMIDE 40 MG
10 TABLET ORAL
Refills: 0 | Status: DISCONTINUED | OUTPATIENT
Start: 2020-08-31 | End: 2020-09-01

## 2020-08-31 RX ORDER — FINASTERIDE 5 MG/1
5 TABLET, FILM COATED ORAL DAILY
Refills: 0 | Status: DISCONTINUED | OUTPATIENT
Start: 2020-08-31 | End: 2020-09-01

## 2020-08-31 RX ORDER — LANOLIN ALCOHOL/MO/W.PET/CERES
3 CREAM (GRAM) TOPICAL AT BEDTIME
Refills: 0 | Status: DISCONTINUED | OUTPATIENT
Start: 2020-08-31 | End: 2020-09-01

## 2020-08-31 RX ORDER — TAMSULOSIN HYDROCHLORIDE 0.4 MG/1
0.4 CAPSULE ORAL AT BEDTIME
Refills: 0 | Status: DISCONTINUED | OUTPATIENT
Start: 2020-08-31 | End: 2020-09-01

## 2020-08-31 RX ADMIN — HEPARIN SODIUM 5000 UNIT(S): 5000 INJECTION INTRAVENOUS; SUBCUTANEOUS at 13:08

## 2020-08-31 RX ADMIN — HEPARIN SODIUM 5000 UNIT(S): 5000 INJECTION INTRAVENOUS; SUBCUTANEOUS at 06:05

## 2020-08-31 RX ADMIN — BENZOCAINE AND MENTHOL 1 LOZENGE: 5; 1 LIQUID ORAL at 13:33

## 2020-08-31 RX ADMIN — ATORVASTATIN CALCIUM 10 MILLIGRAM(S): 80 TABLET, FILM COATED ORAL at 22:14

## 2020-08-31 RX ADMIN — Medication 3 MILLIGRAM(S): at 23:06

## 2020-08-31 RX ADMIN — Medication 85 MILLIMOLE(S): at 13:39

## 2020-08-31 RX ADMIN — PANTOPRAZOLE SODIUM 40 MILLIGRAM(S): 20 TABLET, DELAYED RELEASE ORAL at 13:00

## 2020-08-31 RX ADMIN — FINASTERIDE 5 MILLIGRAM(S): 5 TABLET, FILM COATED ORAL at 16:17

## 2020-08-31 RX ADMIN — RANOLAZINE 500 MILLIGRAM(S): 500 TABLET, FILM COATED, EXTENDED RELEASE ORAL at 18:09

## 2020-08-31 RX ADMIN — TAMSULOSIN HYDROCHLORIDE 0.4 MILLIGRAM(S): 0.4 CAPSULE ORAL at 22:14

## 2020-08-31 NOTE — CONSULT NOTE ADULT - ASSESSMENT
Impression:  # Small Bowel Obstruction: Likely secondary to stricture vs Bezoar  # CAD s/p CABG    Recommendation:  - Will review imaging with radiology in AM to assess location of lesion  - NPO  - Will discuss timing of EGD with attending in AM  - Supportive care per primary team    Laverne Ospina MD  Gastroenterology Fellow  [phone]  Available on Microsoft Teams  Please page on call fellow on weekends and after 5pm on weekdays: [phone] Impression:  # Small Bowel Obstruction: Likely secondary to stricture vs Bezoar  # CAD s/p CABG    Recommendation:  - Will review imaging with radiology in AM to assess location of lesion  - NPO  - Will discuss timing of EGD with attending in AM  - Supportive care per primary team    Addendum (8/31, 10AM)  Imaging reviewed with radiology. No clear evidence of stricture and likely food bezoar is roughly 20 cm from the IC valve. Patient currently tolerating clear liquid diet with NG tube. No plans for endoscopic intervention at this time.  Outpatient follow up in GI clinic unless clinical status changes: [phone]    Laverne Ospina MD  Gastroenterology Fellow  [phone]  Available on Microsoft Teams  Please page on call fellow on weekends and after 5pm on weekdays: [phone]

## 2020-08-31 NOTE — CONSULT NOTE ADULT - ATTENDING COMMENTS
As above.    Pt with history of recurrent SBO, s/p prior small bowel resections.  Admit with abd pain and n/v.  All symptoms resolved.  Had BM.  Tolerating clear liquids.  Imaging reviewed, Distal ileal anastomosis with localized dilation and fecalization.    Recommendations:    Likely food obstruction of anastomosis.  Chronic dilation proximal to anastomosis is not uncommon.  Recommend upper GI series/small bowel followthrough to further characterize anatomy.  Low likelihood of colonoscopy/ileoscopy.

## 2020-08-31 NOTE — PROGRESS NOTE ADULT - PROBLEM SELECTOR PLAN 6
1. Lovenox for DVT PPx    If tolerating PO diet well overnight, no objection to discharge home tomorrow with close outpatient f/u.

## 2020-08-31 NOTE — DIETITIAN INITIAL EVALUATION ADULT. - REASON INDICATOR FOR ASSESSMENT
Patient seen for inadequate diet order x4 days.  Source: Pt and medical record  Pt is a 89 y/o M w/ hx of CABG, CAD, recurrent SBO s/p SBR for bezoar, subsequent SBR for adhesive SBO (10+ years ago at [facility]), b/l inguinal hernia repair, presents with abdominal pain and distention x 1 day. Abdominal pain in periumbilical, epigastric area, similar to previous SBO. Found to have SBO with TP at enteroenteral anastomosis in RLQ, resolving

## 2020-08-31 NOTE — CONSULT NOTE ADULT - SUBJECTIVE AND OBJECTIVE BOX
Chief Complaint:  Patient is a 89y old  Male who presents with a chief complaint of SBO (30 Aug 2020 14:50)    HPI:  89 year old man with hx of CAD s/p CABG, GERD, Hypertension, multiple small bowel obstructions in the past secondary to Bezoar and/or adhesions presents with one day of abdominal distention and pain associated with nausea. Patient describes the pain as mid-abdominal and nonradiating. No reports of fevers, chills, chest pain, shortness of breath, melena, hematochezia, hematemesis and cough.    CT notes "transition point in the distal small bowel loop in the right lower quadrant enteroenteric anastomosis with upstream small bowel dilatation. Heterogeneous structure containing foci of air in the dilated small bowel lumen at the anastomosis"    Allergies:  Cipro (Rash)  penicillins (Unknown)    Home Medications:  Reviewed    Hospital Medications:  benzocaine 15 mG/menthol 3.6 mG (Sugar-Free) Lozenge 1 Lozenge Oral daily  dextrose 5% + sodium chloride 0.45% with potassium chloride 20 mEq/L 1000 milliLiter(s) IV Continuous <Continuous>  heparin   Injectable 5000 Unit(s) SubCutaneous every 8 hours  pantoprazole  Injectable 40 milliGRAM(s) IV Push daily  tetracaine/benzocaine/butamben Spray 1 Spray(s) Topical every 4 hours PRN    PMHX/PSHX:  Gastric motility disorder  Small bowel obstruction due to adhesions  Angina of effort  Hyperlipidemia, unspecified hyperlipidemia type  GERD (gastroesophageal reflux disease)  S/P small bowel resection  Acid reflux  Hx of benign prostatic hypertrophy  Essential hypertension, benign  CAD (coronary artery disease) of bypass graft  CAD S/P percutaneous coronary angioplasty  S/P CABG x 2  S/P small bowel resection  Acid reflux    Family history:  FH: heart disease  No pertinent family history in first degree relatives    Social History:     ROS:   General:  No fevers, chills or night sweats.  ENT:  No sore throat or dysphagia  CV:  No pain or palpitations  Resp:  No dyspnea, cough, wheezing  GI:  + pain, + nausea, No vomiting, No diarrhea, No constipation, No weight loss, No pruritis, No rectal bleeding, No tarry stools  Skin:  No rash or edema      PHYSICAL EXAM:   GENERAL:  NAD, Appears stated age  HEENT:  NC/AT,  conjunctivae clear and pink, sclera -anicteric  CHEST:  CTA B/L, Normal effort  HEART:  RRR S1/S2, No murmurs  ABDOMEN:  Soft, non-tender, non-distended, BS+  EXTEREMITIES:  No cyanosis or Edema  SKIN:  Warm & Dry. No rash or erythema  NEURO:  Alert, oriented    Vital Signs:  Vital Signs Last 24 Hrs  T(C): 37.1 (31 Aug 2020 00:51), Max: 37.1 (30 Aug 2020 21:24)  T(F): 98.8 (31 Aug 2020 00:51), Max: 98.8 (31 Aug 2020 00:51)  HR: 69 (31 Aug 2020 00:51) (61 - 80)  BP: 102/61 (31 Aug 2020 00:51) (102/61 - 147/70)  BP(mean): --  RR: 18 (31 Aug 2020 00:51) (17 - 18)  SpO2: 95% (31 Aug 2020 00:51) (95% - 98%)  Daily     Daily     LABS:                        11.4   7.44  )-----------( 210      ( 30 Aug 2020 07:17 )             35.8     Mean Cell Volume: 98.6 fl (08-30-20 @ 07:17)    08-30    140  |  104  |  9   ----------------------------<  110<H>  4.6   |  24  |  1.14    Ca    8.9      30 Aug 2020 07:17  Phos  3.0     08-30  Mg     2.3     08-30        PT/INR - ( 30 Aug 2020 09:36 )   PT: 12.8 sec;   INR: 1.09 ratio         PTT - ( 30 Aug 2020 09:36 )  PTT:31.8 sec                            11.4   7.44  )-----------( 210      ( 30 Aug 2020 07:17 )             35.8                         8.1    4.49  )-----------( 160      ( 29 Aug 2020 07:14 )             26.9     Imaging:

## 2020-08-31 NOTE — PROGRESS NOTE ADULT - PROBLEM SELECTOR PLAN 5
1. Continue flomax/proscar and transition back to home medication regimen (dutasteride) on discharge.  2. Monitor urine output.

## 2020-08-31 NOTE — PROGRESS NOTE ADULT - ASSESSMENT
89M w/ extensive cardiac history and recurrent SBO p/w abdominal pain, found to have SBO with TP at enteroenteral anastomosis in RLQ, resolving    -advance to clear liquids  - OOB/amb  - PT dispo:  home PT  - AM labs  - monitor GI funton    Acute care surgery (ACS), pager #[phone]

## 2020-08-31 NOTE — PROGRESS NOTE ADULT - ASSESSMENT
89M w/ hx of CABG, CAD, gerd, HLD BPH, recurrent SBO s/p SBR for bezoar, subsequent SBR for adhesive SBO (10+ years ago at [facility]), b/l inguinal hernia repair, presents with abdominal pain and distention x 1 day.  Found to have SBO with TP at enteroenteral anastomosis in RLQ

## 2020-08-31 NOTE — DIETITIAN INITIAL EVALUATION ADULT. - OTHER INFO
Diet PTA: Pt was eating well with no changes in appetite. Pt was not following dietary restrictions; eats well-balanced diet, does own cooking. Confirms no known food allergies. Denies chewing or swallowing issues. Denies micronutrient or supplement use.     Dosing wt: 125 lbs. Reports UBW of 125 lbs, believes he may have had some wt loss in-house in setting of inadequate energy intake. However, unable to quantify, no new wts to trend.     Pt is tolerating clear liquids well. Denies recent N/V, diarrhea, or constipation. Last BM 8/31. Provided low-fiber nutrition therapy including importance of avoiding fiber rich foods, fresh fruits/vegetables, whole grains, and added fiber in processed foods. Discussed chewing foods well and adequate hydration. Pt verbalized understanding and accepted written handout.

## 2020-08-31 NOTE — DIETITIAN INITIAL EVALUATION ADULT. - ADD RECOMMEND
1) Recommend advance to low fiber diet as medically feasible. 2) Reinforce Low Fiber Nutrition Therapy education as able. 3) Continue to trend labs, weight, skin integrity, and intake.

## 2020-08-31 NOTE — PROGRESS NOTE ADULT - PROBLEM SELECTOR PLAN 4
Appears stable from a cardiac perspective. No anginal symptoms and no evidence for fluid overload. Noted to have mildly reduced LV function on prior TTE per outpatient notes.  1. Resume aspirin on discharge.  2. Can resume home dose of lasix (10 mg every other day) tomorrow if tolerating PO diet.  3. Resume home dose of ranexa.  4. Outpatient f/u with Dr. Lisker

## 2020-08-31 NOTE — DIETITIAN INITIAL EVALUATION ADULT. - PHYSICAL APPEARANCE
other (specify)/well nourished Ht: 66 inches  Wt: 125.4 pounds  BMI: 20.3 kG/m2  IBW: 142 pounds +/-10%  IBW%: 88.3%  Skin per nursing documentation: No pressure injuries noted.  Edema per nursing documentation: None noted  Nutrition focused physical exam conducted with pt consent with no significant findings.

## 2020-08-31 NOTE — PROGRESS NOTE ADULT - PROBLEM SELECTOR PLAN 1
Symptomatically improving without intervention. NG tube removed.  1. Advance diet as tolerated.  2. Serial abdominal exams.  3. No plan for endoscopic evaluation per GI. If symptoms continue to improve, will defer small bowel series.  4. Ambulation as tolerated.

## 2020-08-31 NOTE — PROGRESS NOTE ADULT - ATTENDING COMMENTS
Pt seen and examined.  Chart reviewed.  Resident note confirmed.  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

## 2020-09-01 ENCOUNTER — TRANSCRIPTION ENCOUNTER (OUTPATIENT)
Age: 85
End: 2020-09-01

## 2020-09-01 VITALS
DIASTOLIC BLOOD PRESSURE: 54 MMHG | OXYGEN SATURATION: 98 % | SYSTOLIC BLOOD PRESSURE: 107 MMHG | TEMPERATURE: 98 F | RESPIRATION RATE: 18 BRPM | HEART RATE: 75 BPM

## 2020-09-01 LAB
ANION GAP SERPL CALC-SCNC: 13 MMOL/L — SIGNIFICANT CHANGE UP (ref 5–17)
BUN SERPL-MCNC: 10 MG/DL — SIGNIFICANT CHANGE UP (ref 7–23)
CALCIUM SERPL-MCNC: 8.3 MG/DL — LOW (ref 8.4–10.5)
CHLORIDE SERPL-SCNC: 108 MMOL/L — SIGNIFICANT CHANGE UP (ref 96–108)
CO2 SERPL-SCNC: 18 MMOL/L — LOW (ref 22–31)
CREAT SERPL-MCNC: 1.12 MG/DL — SIGNIFICANT CHANGE UP (ref 0.5–1.3)
GLUCOSE SERPL-MCNC: 104 MG/DL — HIGH (ref 70–99)
HCT VFR BLD CALC: 30.9 % — LOW (ref 39–50)
HGB BLD-MCNC: 10 G/DL — LOW (ref 13–17)
MAGNESIUM SERPL-MCNC: 1.9 MG/DL — SIGNIFICANT CHANGE UP (ref 1.6–2.6)
MCHC RBC-ENTMCNC: 32.3 PG — SIGNIFICANT CHANGE UP (ref 27–34)
MCHC RBC-ENTMCNC: 32.4 GM/DL — SIGNIFICANT CHANGE UP (ref 32–36)
MCV RBC AUTO: 99.7 FL — SIGNIFICANT CHANGE UP (ref 80–100)
NRBC # BLD: 0 /100 WBCS — SIGNIFICANT CHANGE UP (ref 0–0)
PHOSPHATE SERPL-MCNC: 3.9 MG/DL — SIGNIFICANT CHANGE UP (ref 2.5–4.5)
PLATELET # BLD AUTO: 207 K/UL — SIGNIFICANT CHANGE UP (ref 150–400)
POTASSIUM SERPL-MCNC: 3.9 MMOL/L — SIGNIFICANT CHANGE UP (ref 3.5–5.3)
POTASSIUM SERPL-SCNC: 3.9 MMOL/L — SIGNIFICANT CHANGE UP (ref 3.5–5.3)
RBC # BLD: 3.1 M/UL — LOW (ref 4.2–5.8)
RBC # FLD: 13.5 % — SIGNIFICANT CHANGE UP (ref 10.3–14.5)
SODIUM SERPL-SCNC: 139 MMOL/L — SIGNIFICANT CHANGE UP (ref 135–145)
WBC # BLD: 4.99 K/UL — SIGNIFICANT CHANGE UP (ref 3.8–10.5)
WBC # FLD AUTO: 4.99 K/UL — SIGNIFICANT CHANGE UP (ref 3.8–10.5)

## 2020-09-01 PROCEDURE — 93005 ELECTROCARDIOGRAM TRACING: CPT | Mod: XU

## 2020-09-01 PROCEDURE — 82330 ASSAY OF CALCIUM: CPT

## 2020-09-01 PROCEDURE — 71045 X-RAY EXAM CHEST 1 VIEW: CPT

## 2020-09-01 PROCEDURE — 84295 ASSAY OF SERUM SODIUM: CPT

## 2020-09-01 PROCEDURE — 96361 HYDRATE IV INFUSION ADD-ON: CPT | Mod: XU

## 2020-09-01 PROCEDURE — 99232 SBSQ HOSP IP/OBS MODERATE 35: CPT

## 2020-09-01 PROCEDURE — 83605 ASSAY OF LACTIC ACID: CPT

## 2020-09-01 PROCEDURE — 99285 EMERGENCY DEPT VISIT HI MDM: CPT | Mod: 25

## 2020-09-01 PROCEDURE — 85014 HEMATOCRIT: CPT

## 2020-09-01 PROCEDURE — 97530 THERAPEUTIC ACTIVITIES: CPT

## 2020-09-01 PROCEDURE — 84132 ASSAY OF SERUM POTASSIUM: CPT

## 2020-09-01 PROCEDURE — 43753 TX GASTRO INTUB W/ASP: CPT

## 2020-09-01 PROCEDURE — U0003: CPT

## 2020-09-01 PROCEDURE — 96376 TX/PRO/DX INJ SAME DRUG ADON: CPT | Mod: XU

## 2020-09-01 PROCEDURE — 86900 BLOOD TYPING SEROLOGIC ABO: CPT

## 2020-09-01 PROCEDURE — 97116 GAIT TRAINING THERAPY: CPT

## 2020-09-01 PROCEDURE — 82435 ASSAY OF BLOOD CHLORIDE: CPT

## 2020-09-01 PROCEDURE — 97161 PT EVAL LOW COMPLEX 20 MIN: CPT

## 2020-09-01 PROCEDURE — 86850 RBC ANTIBODY SCREEN: CPT

## 2020-09-01 PROCEDURE — 85610 PROTHROMBIN TIME: CPT

## 2020-09-01 PROCEDURE — 74177 CT ABD & PELVIS W/CONTRAST: CPT

## 2020-09-01 PROCEDURE — 86901 BLOOD TYPING SEROLOGIC RH(D): CPT

## 2020-09-01 PROCEDURE — 85730 THROMBOPLASTIN TIME PARTIAL: CPT

## 2020-09-01 PROCEDURE — 80053 COMPREHEN METABOLIC PANEL: CPT

## 2020-09-01 PROCEDURE — 84100 ASSAY OF PHOSPHORUS: CPT

## 2020-09-01 PROCEDURE — 85027 COMPLETE CBC AUTOMATED: CPT

## 2020-09-01 PROCEDURE — 80048 BASIC METABOLIC PNL TOTAL CA: CPT

## 2020-09-01 PROCEDURE — 96374 THER/PROPH/DIAG INJ IV PUSH: CPT | Mod: XU

## 2020-09-01 PROCEDURE — 82947 ASSAY GLUCOSE BLOOD QUANT: CPT

## 2020-09-01 PROCEDURE — 82803 BLOOD GASES ANY COMBINATION: CPT

## 2020-09-01 PROCEDURE — 83735 ASSAY OF MAGNESIUM: CPT

## 2020-09-01 PROCEDURE — 96375 TX/PRO/DX INJ NEW DRUG ADDON: CPT | Mod: XU

## 2020-09-01 RX ORDER — FUROSEMIDE 40 MG
10 TABLET ORAL
Qty: 0 | Refills: 0 | DISCHARGE

## 2020-09-01 RX ORDER — ACETAMINOPHEN 500 MG
2 TABLET ORAL
Qty: 0 | Refills: 0 | DISCHARGE

## 2020-09-01 RX ADMIN — ENOXAPARIN SODIUM 40 MILLIGRAM(S): 100 INJECTION SUBCUTANEOUS at 11:50

## 2020-09-01 RX ADMIN — FINASTERIDE 5 MILLIGRAM(S): 5 TABLET, FILM COATED ORAL at 11:50

## 2020-09-01 RX ADMIN — RANOLAZINE 500 MILLIGRAM(S): 500 TABLET, FILM COATED, EXTENDED RELEASE ORAL at 05:11

## 2020-09-01 RX ADMIN — PANTOPRAZOLE SODIUM 40 MILLIGRAM(S): 20 TABLET, DELAYED RELEASE ORAL at 05:11

## 2020-09-01 RX ADMIN — Medication 10 MILLIGRAM(S): at 08:14

## 2020-09-01 NOTE — DISCHARGE NOTE NURSING/CASE MANAGEMENT/SOCIAL WORK - PATIENT PORTAL LINK FT
You can access the FollowMyHealth Patient Portal offered by [facility] by registering at the following website: [email or web address]. By joining [facility]’s FollowMyHealth portal, you will also be able to view your health information using other applications (apps) compatible with our system.

## 2020-09-01 NOTE — DISCHARGE NOTE PROVIDER - NSDCMRMEDTOKEN_GEN_ALL_CORE_FT
Aspirin Enteric Coated 81 mg oral delayed release tablet: 1 tab(s) orally once a day  atorvastatin 10 mg oral tablet: 1 tab(s) orally once a day  Avodart 0.5 mg oral capsule: 1 cap(s) orally once a day  Flomax 0.4 mg oral capsule: orally 2 times a day  gabapentin 100 mg oral tablet: orally once a day  Lasix: 10 milligram(s) orally every other day   Protonix 40 mg oral delayed release tablet: 1 tab(s) orally once a day  Ranexa 500 mg oral tablet, extended release: 1 tab(s) orally 2 times a day

## 2020-09-01 NOTE — PROGRESS NOTE ADULT - ASSESSMENT
89M w/ hx of CABG, CAD, gerd, HLD BPH, recurrent SBO s/p SBR for bezoar, subsequent SBR for adhesive SBO (10+ years ago at [facility]), b/l inguinal hernia repair, presents with abdominal pain and distention x 1 day.  Found to have SBO with TP at enteroenteral anastomosis in RLQ

## 2020-09-01 NOTE — DISCHARGE NOTE PROVIDER - CARE PROVIDER_API CALL
Lalo Juarez  GASTROENTEROLOGY  Division of Gastroenterology  [address] [place] [address]  Phone: [phone]  Fax: [phone]  Follow Up Time: 2 weeks

## 2020-09-01 NOTE — PROGRESS NOTE ADULT - PROBLEM SELECTOR PLAN 6
1. Lovenox for DVT PPx    Discharge planning to home today. Hospitalization communicated to Dr. Lisker, the patient's primary cardiologist. He is interested in establishing care with an internist as well, but will speak to Dr. Lisker first to get recommendations.

## 2020-09-01 NOTE — PROGRESS NOTE ADULT - PROBLEM SELECTOR PLAN 4
Appears stable from a cardiac perspective. No anginal symptoms and no evidence for fluid overload. Noted to have mildly reduced LV function on prior TTE per outpatient notes.  1. Resume aspirin on discharge.  2. Can resume home dose of lasix (10 mg every other day) today.  3. Continue home dose of ranexa.  4. Outpatient f/u with Dr. Lisker

## 2020-09-01 NOTE — DISCHARGE NOTE PROVIDER - HOSPITAL COURSE
89M w/ hx of CABG, CAD, recurrent SBO s/p SBR for bezoar, subsequent SBR for adhesive SBO (10+ years ago at [facility]), b/l inguinal hernia repair, presents with abdominal pain and distention x 1 day. Abdominal pain in periumbilical, epigastric area, similar to previous SBO, associated with nausea. Last BM and flatus 1 day ago. Patient denies fevers/chills, denies lightheadedness/dizziness, denies SOB/chest pain, denies nausea/vomiting        CT abd/pelv showed Small bowel obstruction with a transition point at the enteroenteric anastomosis of the right lower quadrant. Heterogeneous structure containing foci of air in the dilated small bowel lumen at the anastomosis. Bezoar is not entirely excluded, given history. Nonspecific small bowel wall thickening, mesenteric edema and free fluid, which can be seen in the setting of bowel congestion/ischemia and/or enteritis. Recommend clinical correlation.        NGT placed. NPO/IVF/NGT        GI consulted to eval dilatation- Likely food obstruction of anastomosis.    Chronic dilation proximal to anastomosis is not uncommon.    Recommend upper GI series/small bowel followthrough to further characterize anatomy.            NGT fell out. Pt no nausea/vomitng     Pt had return of Gi function started on clears advnaced to LRD. Since patient symptoms improved Gi series deferred.         Pt seen by PT no skilled needs         Pt discharged home to follow up with GI, medicine

## 2020-09-01 NOTE — PROGRESS NOTE ADULT - ASSESSMENT
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

## 2020-09-01 NOTE — DISCHARGE NOTE PROVIDER - NSDCCPCAREPLAN_GEN_ALL_CORE_FT
PRINCIPAL DISCHARGE DIAGNOSIS  Diagnosis: Small bowel obstruction  Assessment and Plan of Treatment: regular diet as toelrated  follow up with GI Dr. Juarez in 1-2 weeks   Please follow up with your regular medical doctor Dr. Lisker in 1 week, please call to schedule an appointment to discuss recent hospitalization  return to emergecny room if develop nausea, vomiting, unalbe to toelrate diet, abdominal pain distention or stop passing gas having bowel movements

## 2020-09-01 NOTE — PROGRESS NOTE ADULT - SUBJECTIVE AND OBJECTIVE BOX
ACS DAILY PROGRESS NOTE:       SUBJECTIVE/ROS: Patient reports midline abdominal pain- no flatus or bowel movement- Denies nausea, vomiting, chest pain, shortness of breath         MEDICATIONS  (STANDING):  acetaminophen  IVPB .. 1000 milliGRAM(s) IV Intermittent once  heparin   Injectable 5000 Unit(s) SubCutaneous every 8 hours  lactated ringers. 1000 milliLiter(s) (75 mL/Hr) IV Continuous <Continuous>    MEDICATIONS  (PRN):      OBJECTIVE:    Vital Signs Last 24 Hrs  T(C): 36.8 (28 Aug 2020 07:53), Max: 37.3 (28 Aug 2020 05:43)  T(F): 98.2 (28 Aug 2020 07:53), Max: 99.1 (28 Aug 2020 05:43)  HR: 90 (28 Aug 2020 07:53) (80 - 92)  BP: 128/82 (28 Aug 2020 07:53) (127/86 - 155/74)  BP(mean): 97 (28 Aug 2020 05:43) (97 - 99)  RR: 16 (28 Aug 2020 07:53) (16 - 20)  SpO2: 94% (28 Aug 2020 07:53) (94% - 100%)        I&O's Detail      Daily Height in cm: 167.64 (28 Aug 2020 07:53)    Daily Weight in k.9 (28 Aug 2020 07:53)    LABS:                        11.2   7.32  )-----------( 222      ( 28 Aug 2020 01:35 )             35.0     08-28    141  |  106  |  20  ----------------------------<  115<H>  4.1   |  24  |  1.32<H>    Ca    9.0      28 Aug 2020 01:35    TPro  5.7<L>  /  Alb  3.5  /  TBili  0.3  /  DBili  x   /  AST  19  /  ALT  14  /  AlkPhos  79  08    PT/INR - ( 28 Aug 2020 01:35 )   PT: 12.2 sec;   INR: 1.03 ratio         PTT - ( 28 Aug 2020 01:35 )  PTT:34.7 sec                Physical Exam:  Gen: NAD, resting in bed, alert and responding appropriately  Resp: Airway patent, non-labored respirations  Abd: Soft, ND, mild periumbilical TTP, no rebound or guarding. prior midline surgical scar well-healed  Ext: No edema, WWP Neuro: AAOx3, no focal deficits	  Psychiatric: oriented x 3; appropriate
[facility] Division of Hospital Medicine  Roger Hatfield MD  Pager (GLORIA-RENETTA, 8A-5P): [phone]  Other Times:  [phone]    Patient is a 89y old  Male who presents with a chief complaint of SBO (31 Aug 2020 07:51)    SUBJECTIVE / OVERNIGHT EVENTS: Feels well this afternoon. Notes that he has been passing gas and having frequent BMs, now described as diarrhea. No abdominal pain. No nausea/vomiting. No chest pain or SOB. No LE edema    MEDICATIONS  (STANDING):  finasteride 5 milliGRAM(s) Oral daily  heparin   Injectable 5000 Unit(s) SubCutaneous every 8 hours  pantoprazole    Tablet 40 milliGRAM(s) Oral before breakfast  tamsulosin 0.4 milliGRAM(s) Oral at bedtime    MEDICATIONS  (PRN):      CAPILLARY BLOOD GLUCOSE        I&O's Summary    30 Aug 2020 07:01  -  31 Aug 2020 07:00  --------------------------------------------------------  IN: 1800 mL / OUT: 1325 mL / NET: 475 mL    31 Aug 2020 07:01  -  31 Aug 2020 16:12  --------------------------------------------------------  IN: 850 mL / OUT: 100 mL / NET: 750 mL        PHYSICAL EXAM:  Vital Signs Last 24 Hrs  T(C): 36.3 (31 Aug 2020 13:06), Max: 37.1 (30 Aug 2020 21:24)  T(F): 97.4 (31 Aug 2020 13:06), Max: 98.8 (31 Aug 2020 00:51)  HR: 61 (31 Aug 2020 13:06) (60 - 80)  BP: 131/77 (31 Aug 2020 13:06) (102/61 - 144/78)  BP(mean): --  RR: 18 (31 Aug 2020 13:06) (17 - 18)  SpO2: 98% (31 Aug 2020 13:06) (95% - 98%)  CONSTITUTIONAL: NAD, well-developed, well-groomed  EYES: EOMI; conjunctiva and sclera clear  ENMT: Moist oral mucosa, no pharyngeal injection or exudates; normal dentition  NECK: Supple, no palpable masses; no thyromegaly  RESPIRATORY: Normal respiratory effort; lungs are clear to auscultation bilaterally  CARDIOVASCULAR: Regular rate and rhythm, normal S1 and S2, no murmur/rub/gallop; No lower extremity edema; Peripheral pulses are 2+ bilaterally  ABDOMEN: Nontender to palpation, hyperactive bowel sounds, no rebound/guarding; No hepatosplenomegaly. + paraumbilical hernia, soft, reducible and chronic per patient.  MUSCULOSKELETAL:  Normal gait; no clubbing or cyanosis of digits  PSYCH: A+O to person, place, and time; affect appropriate    LABS:                        10.6   5.42  )-----------( 208      ( 31 Aug 2020 06:29 )             33.7     08-31    136  |  105  |  8   ----------------------------<  108<H>  4.2   |  21<L>  |  1.03    Ca    8.4      31 Aug 2020 06:28  Phos  2.6     08-31  Mg     2.1     08-31      PT/INR - ( 31 Aug 2020 08:53 )   PT: 13.2 sec;   INR: 1.12 ratio         PTT - ( 31 Aug 2020 08:53 )  PTT:32.0 sec    RADIOLOGY & ADDITIONAL TESTS:  Results Reviewed: Labs reviewed as above  Imaging Personally Reviewed: No new studies
SURGERY  Pager: [phone]    INTERVAL EVENTS/SUBJECTIVE: No acute events overnight. Pt. seen on AM rounds denying pain, had 2 BM yesterday, passing gas. No n/v/d, chest pain, SOB, or palpitations. Pt. tolerating diet and wants to go home.     ______________________________________________  OBJECTIVE:   T(C): 36.8 (09-01-20 @ 09:53), Max: 36.9 (08-31-20 @ 21:12)  HR: 75 (09-01-20 @ 09:53) (61 - 75)  BP: 107/54 (09-01-20 @ 09:53) (101/63 - 131/77)  RR: 18 (09-01-20 @ 09:53) (18 - 18)  SpO2: 98% (09-01-20 @ 09:53) (93% - 98%)  Wt(kg): --  CAPILLARY BLOOD GLUCOSE        I&O's Detail    31 Aug 2020 07:01  -  01 Sep 2020 07:00  --------------------------------------------------------  IN:    Oral Fluid: 1090 mL    Solution: 250 mL  Total IN: 1340 mL    OUT:    Voided: 300 mL  Total OUT: 300 mL    Total NET: 1040 mL          Physical exam:  Gen: NAD, lying in bed & interactive with team   Chest: breathing comfortably on RA   Abdomen: soft, nontender, nondistended   Vascular: WWP   ______________________________________________  LABS:  CBC Full  -  ( 01 Sep 2020 07:05 )  WBC Count : 4.99 K/uL  RBC Count : 3.10 M/uL  Hemoglobin : 10.0 g/dL  Hematocrit : 30.9 %  Platelet Count - Automated : 207 K/uL  Mean Cell Volume : 99.7 fl  Mean Cell Hemoglobin : 32.3 pg  Mean Cell Hemoglobin Concentration : 32.4 gm/dL  Auto Neutrophil # : x  Auto Lymphocyte # : x  Auto Monocyte # : x  Auto Eosinophil # : x  Auto Basophil # : x  Auto Neutrophil % : x  Auto Lymphocyte % : x  Auto Monocyte % : x  Auto Eosinophil % : x  Auto Basophil % : x    09-01    139  |  108  |  10  ----------------------------<  104<H>  3.9   |  18<L>  |  1.12    Ca    8.3<L>      01 Sep 2020 07:08  Phos  3.9     09-01  Mg     1.9     09-01      _____________________________________________  RADIOLOGY:
[facility] Division of Hospital Medicine  Roger Hatfield MD  Pager (GLORIA-RENETTA, 0E-5P): [phone]  Other Times:  [phone]    Patient is a 89y old  Male who presents with a chief complaint of SBO (01 Sep 2020 10:06)    SUBJECTIVE / OVERNIGHT EVENTS: Continued to have bowel movements overnight, described as diarrhea, which is normal for him. Denies abdominal pain. No nausea/vomiting. Tolerated dinner and breakfast this morning. Eager to go home.    MEDICATIONS  (STANDING):  atorvastatin 10 milliGRAM(s) Oral at bedtime  enoxaparin Injectable 40 milliGRAM(s) SubCutaneous daily  finasteride 5 milliGRAM(s) Oral daily  furosemide    Tablet 10 milliGRAM(s) Oral <User Schedule>  melatonin 3 milliGRAM(s) Oral at bedtime  pantoprazole    Tablet 40 milliGRAM(s) Oral before breakfast  ranolazine 500 milliGRAM(s) Oral two times a day  tamsulosin 0.4 milliGRAM(s) Oral at bedtime    MEDICATIONS  (PRN):      CAPILLARY BLOOD GLUCOSE        I&O's Summary    31 Aug 2020 07:01  -  01 Sep 2020 07:00  --------------------------------------------------------  IN: 1340 mL / OUT: 300 mL / NET: 1040 mL    01 Sep 2020 07:01  -  01 Sep 2020 13:28  --------------------------------------------------------  IN: 300 mL / OUT: 3 mL / NET: 297 mL        PHYSICAL EXAM:  Vital Signs Last 24 Hrs  T(C): 36.8 (01 Sep 2020 09:53), Max: 36.9 (31 Aug 2020 21:12)  T(F): 98.2 (01 Sep 2020 09:53), Max: 98.4 (31 Aug 2020 21:12)  HR: 75 (01 Sep 2020 09:53) (63 - 75)  BP: 107/54 (01 Sep 2020 09:53) (101/63 - 122/81)  BP(mean): --  RR: 18 (01 Sep 2020 09:53) (18 - 18)  SpO2: 98% (01 Sep 2020 09:53) (93% - 98%)  CONSTITUTIONAL: NAD, well-developed, well-groomed  EYES: EOMI; conjunctiva and sclera clear  RESPIRATORY: Normal respiratory effort; lungs are clear to auscultation bilaterally  CARDIOVASCULAR: Regular rate and rhythm, normal S1 and S2, no murmur/rub/gallop; No lower extremity edema; Peripheral pulses are 2+ bilaterally  ABDOMEN: Nontender to palpation, hyperactive bowel sounds, no rebound/guarding; No hepatosplenomegaly. + paraumbilical hernia, soft, reducible and chronic per patient.  MUSCULOSKELETAL:  Normal gait; no clubbing or cyanosis of digits  PSYCH: A+O to person, place, and time; affect appropriate    LABS:                        10.0   4.99  )-----------( 207      ( 01 Sep 2020 07:05 )             30.9     09-01    139  |  108  |  10  ----------------------------<  104<H>  3.9   |  18<L>  |  1.12    Ca    8.3<L>      01 Sep 2020 07:08  Phos  3.9     09-01  Mg     1.9     09-01      PT/INR - ( 31 Aug 2020 08:53 )   PT: 13.2 sec;   INR: 1.12 ratio         PTT - ( 31 Aug 2020 08:53 )  PTT:32.0 sec      RADIOLOGY & ADDITIONAL TESTS:  Results Reviewed: Labs reviewed as above.  Imaging Personally Reviewed: No new studies.    COORDINATION OF CARE:  Care Discussed with Consultants/Other Providers [Y]: Dr. Shi regarding discharge planning.  Prior or Outpatient Records Reviewed [Y/N]:
Subjective:   Patient seen at bedside this AM. Reports feeling well, without complaints. Denies chest pain, SOB, N/V. Reports some abdominal pain but it is well-controlled when he receives pain medication. Passing flatus but has not had a bowel movement.     24h Events:   - Overnight, no acute events    Objective:  Physical Exam:  GEN: resting in bed comfortably in NAD  HEAD: NGT in place, with clear bilious OP  RESP: no increased WOB  ABD: soft, non-distended, mildly tender to palpation around umbilicus without rebound tenderness or guarding  EXTR: warm, well-perfused without gross deformities; spontaneous movement in b/l U/L extrem  NEURO: AAOx4
Subjective:   Patient seen at bedside this AM. Reports feeling well, without complaints. Denies chest pain, SOB, N/V. Reports some abdominal pain but it is well-controlled when he receives pain medication. Passing flatus but has not had a bowel movement.     24h Events:   - Overnight, no acute events    Objective:  Vital Signs  T(C): 37.3 ( @ 12:39), Max: 37.3 ( @ 12:39)  HR: 67 ( @ 12:39) (54 - 67)  BP: 118/57 ( @ 12:39) (109/69 - 127/78)  RR: 18 ( @ 12:39) (18 - 18)  SpO2: 97% ( @ 12:39) (90% - 97%)  20 @ 07:01  -  20 @ 07:00  --------------------------------------------------------  IN: 900 mL / OUT: 1100 mL / NET: -200 mL    20 @ 07:01  -  20 @ 14:10  --------------------------------------------------------  IN: 400 mL / OUT: 450 mL / NET: -50 mL      Physical Exam:  GEN: resting in bed comfortably in NAD  HEAD: NGT in place, with clear bilious OP  RESP: no increased WOB  ABD: soft, non-distended, mildly tender to palpation around umbilicus without rebound tenderness or guarding  EXTR: warm, well-perfused without gross deformities; spontaneous movement in b/l U/L extrem  NEURO: AAOx4    Labs:             8.1    4.49  )-----------( 160      ( 29 Aug 2020 07:14 )             26.9   08-29    134<L>  |  102  |  10  ----------------------------<  91  4.4   |  24  |  1.01    Ca    8.2<L>      29 Aug 2020 09:32  Phos  2.5     08-  Mg     1.7     -    TPro  5.7<L>  /  Alb  3.5  /  TBili  0.3  /  DBili  x   /  AST  19  /  ALT  14  /  AlkPhos  79        Medications:   MEDICATIONS  (STANDING):  benzocaine 15 mG/menthol 3.6 mG (Sugar-Free) Lozenge 1 Lozenge Oral daily  dextrose 5% + sodium chloride 0.45% with potassium chloride 20 mEq/L 1000 milliLiter(s) (75 mL/Hr) IV Continuous <Continuous>  heparin   Injectable 5000 Unit(s) SubCutaneous every 8 hours  pantoprazole  Injectable 40 milliGRAM(s) IV Push daily    MEDICATIONS  (PRN):  tetracaine/benzocaine/butamben Spray 1 Spray(s) Topical every 4 hours PRN sore throat      Imagin/28 CT A/P IV Contrast:   Small bowel obstruction with a transition point at the enteroenteric anastomosis of the right lower quadrant. Heterogeneous structure containing foci of air in the dilated small bowel lumen at the anastomosis. Bezoar is not entirely excluded, given history. Nonspecific small bowel wall thickening, mesenteric edema and free fluid, which can be seen in the setting of bowel congestion/ischemia and/or enteritis. Recommend clinical correlation.  Prominent bladder wall, which may be due to partial distention. Recommend clinical limited to assess urinary tract infection.     CXR:   Enteric tube within the stomach. Small right pleural effusion/pleural thickening and opacities within right lower lung are unchanged. 1.2 cm nodular opacity projecting over the anterior left 5th rib. Recommend further evaluation with chest CT.      Assessment:   89M w/ extensive cardiac history and recurrent SBO p/w abdominal pain, found to have SBO with TP at enteroenteral anastomosis in RLQ. Clinically stable on floors, passing flatus but still having high NGT OP.     Plan:   - NPO/IVF  - NGT to LCWS  - Continue to monitor NGT OP  - Serial abdominal exam  - No standing pain medication  - Physical exam prior to pain medication   - F/u radiology re: bezoar vs. stricture on CT scan        Mere Ryan, PGY-1  ATP  [id number]
GENERAL SURGERY DAILY PROGRESS NOTE:       Subjective / Overnight events:  NGT out overnight, denies N/V.  +flatus, +BM.  Pain controlled.  Feels hungry.      Objective:      MEDICATIONS  (STANDING):  benzocaine 15 mG/menthol 3.6 mG (Sugar-Free) Lozenge 1 Lozenge Oral daily  dextrose 5% + sodium chloride 0.45% with potassium chloride 20 mEq/L 1000 milliLiter(s) (75 mL/Hr) IV Continuous <Continuous>  heparin   Injectable 5000 Unit(s) SubCutaneous every 8 hours  pantoprazole  Injectable 40 milliGRAM(s) IV Push daily  sodium phosphate IVPB 30 milliMole(s) IV Intermittent once    MEDICATIONS  (PRN):  tetracaine/benzocaine/butamben Spray 1 Spray(s) Topical every 4 hours PRN sore throat      Vital Signs Last 24 Hrs  T(C): 36.9 (31 Aug 2020 06:05), Max: 37.1 (30 Aug 2020 21:24)  T(F): 98.5 (31 Aug 2020 06:05), Max: 98.8 (31 Aug 2020 00:51)  HR: 60 (31 Aug 2020 06:05) (60 - 80)  BP: 138/82 (31 Aug 2020 06:05) (102/61 - 144/78)  BP(mean): --  RR: 18 (31 Aug 2020 06:05) (17 - 18)  SpO2: 97% (31 Aug 2020 06:05) (95% - 98%)    I&O's Detail    30 Aug 2020 07:01  -  31 Aug 2020 07:00  --------------------------------------------------------  IN:    dextrose 5% + sodium chloride 0.45% with potassium chloride 20 mEq/L: 1700 mL    Solution: 100 mL  Total IN: 1800 mL    OUT:    Nasoenteral Tube: 100 mL    Voided: 1225 mL  Total OUT: 1325 mL    Total NET: 475 mL          Daily     Daily     LABS:                        10.6   5.42  )-----------( 208      ( 31 Aug 2020 06:29 )             33.7     08-31    136  |  105  |  8   ----------------------------<  108<H>  4.2   |  21<L>  |  1.03    Ca    8.4      31 Aug 2020 06:28  Phos  2.6     08-31  Mg     2.1     08-31      PT/INR - ( 30 Aug 2020 09:36 )   PT: 12.8 sec;   INR: 1.09 ratio         PTT - ( 30 Aug 2020 09:36 )  PTT:31.8 sec        PHYSICAL EXAM  --------------------------------------------------------------------------------    Physical Exam:  	Gen: NAD, well-appearing  	HEENT: PERRL, supple neck, clear oropharynx  	Abd: soft, nontender/nondistended, no rebound, no guarding          UE: Warm, FROM, no clubbing, intact strength; no edema; no asterixis  	LE: Warm, FROM, no clubbing, intact strength; no edema  	Neuro: No focal deficits, intact gait  	Psych: Normal affect and mood  	Skin: Warm, without rashes

## 2020-09-01 NOTE — PROGRESS NOTE ADULT - PROBLEM SELECTOR PLAN 1
Symptomatically improving without intervention. NG tube removed.  1. Diet as tolerated.  2. Serial abdominal exams.  3. No plan for endoscopic evaluation per GI. As symptoms continue to improve, will defer small bowel series as inpatient. Outpatient f/u with Dr. Juarez.  4. Ambulation as tolerated.

## 2020-09-11 ENCOUNTER — INPATIENT (INPATIENT)
Facility: HOSPITAL | Age: 85
LOS: 1 days | Discharge: HOME | DRG: 390 | End: 2020-09-13
Attending: SURGERY | Admitting: SURGERY
Payer: MEDICARE

## 2020-09-11 VITALS
TEMPERATURE: 98 F | HEIGHT: 66 IN | OXYGEN SATURATION: 95 % | SYSTOLIC BLOOD PRESSURE: 133 MMHG | HEART RATE: 96 BPM | RESPIRATION RATE: 16 BRPM | DIASTOLIC BLOOD PRESSURE: 79 MMHG | WEIGHT: 123.9 LBS

## 2020-09-11 DIAGNOSIS — K56.609 UNSPECIFIED INTESTINAL OBSTRUCTION, UNSPECIFIED AS TO PARTIAL VERSUS COMPLETE OBSTRUCTION: ICD-10-CM

## 2020-09-11 DIAGNOSIS — Z95.1 PRESENCE OF AORTOCORONARY BYPASS GRAFT: Chronic | ICD-10-CM

## 2020-09-11 LAB
ALBUMIN SERPL ELPH-MCNC: 3.5 G/DL — SIGNIFICANT CHANGE UP (ref 3.3–5)
ALP SERPL-CCNC: 79 U/L — SIGNIFICANT CHANGE UP (ref 40–120)
ALT FLD-CCNC: 18 U/L — SIGNIFICANT CHANGE UP (ref 10–45)
ANION GAP SERPL CALC-SCNC: 11 MMOL/L — SIGNIFICANT CHANGE UP (ref 5–17)
APPEARANCE UR: CLEAR — SIGNIFICANT CHANGE UP
AST SERPL-CCNC: 23 U/L — SIGNIFICANT CHANGE UP (ref 10–40)
BASOPHILS # BLD AUTO: 0.05 K/UL — SIGNIFICANT CHANGE UP (ref 0–0.2)
BASOPHILS NFR BLD AUTO: 0.6 % — SIGNIFICANT CHANGE UP (ref 0–2)
BILIRUB SERPL-MCNC: 0.4 MG/DL — SIGNIFICANT CHANGE UP (ref 0.2–1.2)
BILIRUB UR-MCNC: NEGATIVE — SIGNIFICANT CHANGE UP
BUN SERPL-MCNC: 17 MG/DL — SIGNIFICANT CHANGE UP (ref 7–23)
CALCIUM SERPL-MCNC: 9 MG/DL — SIGNIFICANT CHANGE UP (ref 8.4–10.5)
CHLORIDE SERPL-SCNC: 106 MMOL/L — SIGNIFICANT CHANGE UP (ref 96–108)
CO2 SERPL-SCNC: 21 MMOL/L — LOW (ref 22–31)
COLOR SPEC: SIGNIFICANT CHANGE UP
CREAT SERPL-MCNC: 1.21 MG/DL — SIGNIFICANT CHANGE UP (ref 0.5–1.3)
DIFF PNL FLD: NEGATIVE — SIGNIFICANT CHANGE UP
EOSINOPHIL # BLD AUTO: 0.23 K/UL — SIGNIFICANT CHANGE UP (ref 0–0.5)
EOSINOPHIL NFR BLD AUTO: 2.9 % — SIGNIFICANT CHANGE UP (ref 0–6)
GLUCOSE SERPL-MCNC: 102 MG/DL — HIGH (ref 70–99)
GLUCOSE UR QL: NEGATIVE — SIGNIFICANT CHANGE UP
HCT VFR BLD CALC: 33.9 % — LOW (ref 39–50)
HGB BLD-MCNC: 10.9 G/DL — LOW (ref 13–17)
IMM GRANULOCYTES NFR BLD AUTO: 0.4 % — SIGNIFICANT CHANGE UP (ref 0–1.5)
KETONES UR-MCNC: NEGATIVE — SIGNIFICANT CHANGE UP
LACTATE SERPL-SCNC: 1.8 MMOL/L — SIGNIFICANT CHANGE UP (ref 0.7–2)
LEUKOCYTE ESTERASE UR-ACNC: NEGATIVE — SIGNIFICANT CHANGE UP
LIDOCAIN IGE QN: 24 U/L — SIGNIFICANT CHANGE UP (ref 7–60)
LYMPHOCYTES # BLD AUTO: 0.98 K/UL — LOW (ref 1–3.3)
LYMPHOCYTES # BLD AUTO: 12.2 % — LOW (ref 13–44)
MCHC RBC-ENTMCNC: 31.5 PG — SIGNIFICANT CHANGE UP (ref 27–34)
MCHC RBC-ENTMCNC: 32.2 GM/DL — SIGNIFICANT CHANGE UP (ref 32–36)
MCV RBC AUTO: 98 FL — SIGNIFICANT CHANGE UP (ref 80–100)
MONOCYTES # BLD AUTO: 0.55 K/UL — SIGNIFICANT CHANGE UP (ref 0–0.9)
MONOCYTES NFR BLD AUTO: 6.8 % — SIGNIFICANT CHANGE UP (ref 2–14)
NEUTROPHILS # BLD AUTO: 6.2 K/UL — SIGNIFICANT CHANGE UP (ref 1.8–7.4)
NEUTROPHILS NFR BLD AUTO: 77.1 % — HIGH (ref 43–77)
NITRITE UR-MCNC: NEGATIVE — SIGNIFICANT CHANGE UP
NRBC # BLD: 0 /100 WBCS — SIGNIFICANT CHANGE UP (ref 0–0)
PH UR: 6.5 — SIGNIFICANT CHANGE UP (ref 5–8)
PLATELET # BLD AUTO: 253 K/UL — SIGNIFICANT CHANGE UP (ref 150–400)
POTASSIUM SERPL-MCNC: 4.2 MMOL/L — SIGNIFICANT CHANGE UP (ref 3.5–5.3)
POTASSIUM SERPL-SCNC: 4.2 MMOL/L — SIGNIFICANT CHANGE UP (ref 3.5–5.3)
PROT SERPL-MCNC: 5.7 G/DL — LOW (ref 6–8.3)
PROT UR-MCNC: SIGNIFICANT CHANGE UP
RBC # BLD: 3.46 M/UL — LOW (ref 4.2–5.8)
RBC # FLD: 13.9 % — SIGNIFICANT CHANGE UP (ref 10.3–14.5)
SARS-COV-2 RNA SPEC QL NAA+PROBE: SIGNIFICANT CHANGE UP
SODIUM SERPL-SCNC: 138 MMOL/L — SIGNIFICANT CHANGE UP (ref 135–145)
SP GR SPEC: >1.05 (ref 1.01–1.02)
UROBILINOGEN FLD QL: NEGATIVE — SIGNIFICANT CHANGE UP
WBC # BLD: 8.04 K/UL — SIGNIFICANT CHANGE UP (ref 3.8–10.5)
WBC # FLD AUTO: 8.04 K/UL — SIGNIFICANT CHANGE UP (ref 3.8–10.5)

## 2020-09-11 PROCEDURE — 99285 EMERGENCY DEPT VISIT HI MDM: CPT | Mod: CS,GC

## 2020-09-11 PROCEDURE — 74177 CT ABD & PELVIS W/CONTRAST: CPT | Mod: 26

## 2020-09-11 PROCEDURE — 71045 X-RAY EXAM CHEST 1 VIEW: CPT | Mod: 26

## 2020-09-11 RX ORDER — SODIUM CHLORIDE 9 MG/ML
1000 INJECTION, SOLUTION INTRAVENOUS
Refills: 0 | Status: DISCONTINUED | OUTPATIENT
Start: 2020-09-11 | End: 2020-09-11

## 2020-09-11 RX ORDER — SODIUM CHLORIDE 9 MG/ML
1000 INJECTION, SOLUTION INTRAVENOUS
Refills: 0 | Status: DISCONTINUED | OUTPATIENT
Start: 2020-09-11 | End: 2020-09-12

## 2020-09-11 RX ORDER — ACETAMINOPHEN 500 MG
1000 TABLET ORAL ONCE
Refills: 0 | Status: COMPLETED | OUTPATIENT
Start: 2020-09-11 | End: 2020-09-11

## 2020-09-11 RX ORDER — INFLUENZA VIRUS VACCINE 15; 15; 15; 15 UG/.5ML; UG/.5ML; UG/.5ML; UG/.5ML
0.5 SUSPENSION INTRAMUSCULAR ONCE
Refills: 0 | Status: DISCONTINUED | OUTPATIENT
Start: 2020-09-11 | End: 2020-09-13

## 2020-09-11 RX ORDER — DEXTROSE MONOHYDRATE, SODIUM CHLORIDE, AND POTASSIUM CHLORIDE 50; .745; 4.5 G/1000ML; G/1000ML; G/1000ML
1000 INJECTION, SOLUTION INTRAVENOUS
Refills: 0 | Status: DISCONTINUED | OUTPATIENT
Start: 2020-09-11 | End: 2020-09-11

## 2020-09-11 RX ORDER — TETRACAINE/BENZOCAINE/BUTAMBEN 2%-14%-2%
1 OINTMENT (GRAM) TOPICAL ONCE
Refills: 0 | Status: COMPLETED | OUTPATIENT
Start: 2020-09-11 | End: 2020-09-11

## 2020-09-11 RX ORDER — ENOXAPARIN SODIUM 100 MG/ML
40 INJECTION SUBCUTANEOUS DAILY
Refills: 0 | Status: DISCONTINUED | OUTPATIENT
Start: 2020-09-11 | End: 2020-09-13

## 2020-09-11 RX ORDER — ONDANSETRON 8 MG/1
4 TABLET, FILM COATED ORAL ONCE
Refills: 0 | Status: DISCONTINUED | OUTPATIENT
Start: 2020-09-11 | End: 2020-09-11

## 2020-09-11 RX ORDER — ONDANSETRON 8 MG/1
4 TABLET, FILM COATED ORAL ONCE
Refills: 0 | Status: COMPLETED | OUTPATIENT
Start: 2020-09-11 | End: 2020-09-11

## 2020-09-11 RX ORDER — MORPHINE SULFATE 50 MG/1
4 CAPSULE, EXTENDED RELEASE ORAL ONCE
Refills: 0 | Status: DISCONTINUED | OUTPATIENT
Start: 2020-09-11 | End: 2020-09-11

## 2020-09-11 RX ORDER — KETOROLAC TROMETHAMINE 30 MG/ML
15 SYRINGE (ML) INJECTION EVERY 6 HOURS
Refills: 0 | Status: DISCONTINUED | OUTPATIENT
Start: 2020-09-11 | End: 2020-09-12

## 2020-09-11 RX ADMIN — Medication 1000 MILLIGRAM(S): at 07:21

## 2020-09-11 RX ADMIN — MORPHINE SULFATE 4 MILLIGRAM(S): 50 CAPSULE, EXTENDED RELEASE ORAL at 09:03

## 2020-09-11 RX ADMIN — MORPHINE SULFATE 4 MILLIGRAM(S): 50 CAPSULE, EXTENDED RELEASE ORAL at 10:03

## 2020-09-11 RX ADMIN — ONDANSETRON 4 MILLIGRAM(S): 8 TABLET, FILM COATED ORAL at 05:56

## 2020-09-11 RX ADMIN — SODIUM CHLORIDE 100 MILLILITER(S): 9 INJECTION, SOLUTION INTRAVENOUS at 16:20

## 2020-09-11 RX ADMIN — ENOXAPARIN SODIUM 40 MILLIGRAM(S): 100 INJECTION SUBCUTANEOUS at 13:33

## 2020-09-11 RX ADMIN — Medication 1000 MILLIGRAM(S): at 16:50

## 2020-09-11 RX ADMIN — Medication 15 MILLIGRAM(S): at 21:19

## 2020-09-11 RX ADMIN — SODIUM CHLORIDE 100 MILLILITER(S): 9 INJECTION, SOLUTION INTRAVENOUS at 07:25

## 2020-09-11 RX ADMIN — Medication 400 MILLIGRAM(S): at 05:56

## 2020-09-11 RX ADMIN — Medication 1 SPRAY(S): at 11:15

## 2020-09-11 RX ADMIN — Medication 15 MILLIGRAM(S): at 21:57

## 2020-09-11 RX ADMIN — Medication 400 MILLIGRAM(S): at 16:20

## 2020-09-11 NOTE — ED ADULT NURSE NOTE - OBJECTIVE STATEMENT
89y male from triage complaining of abd pain since midnight, recently seen in ED for similar symptoms, pmh stents, HD, CHF, denies headache, dizziness, changes in vision, chest pain shortness of breath, abd distended and tender to palpation, moves all extremities w/ + pulses,

## 2020-09-11 NOTE — ED PROVIDER NOTE - OBJECTIVE STATEMENT
89 year old male PMH CABG, CAD, recurrent SBO s/p SBR for bezoar, subsequent SBR for adhesive SBO (10+ years ago at [facility]), b/l inguinal hernia repair, p/w abdominal pain. Described as franklin-umbilical, starting at midnight and waking him from sleep. Feels similar to prior SBOs. Recently admitted last week for SBO, medically managed. A/w nausea w/o vomiting. Unsure of last flatus. Denies f/c, cp, sob, back pain, dysuria, or hematuria.

## 2020-09-11 NOTE — H&P ADULT - ASSESSMENT
89M w/ history of recurrent SBOs, now presenting with another SBO.    PLAN:  - Admit to General Surgery under Dr. Junior.  - Given patient's history of recurrent SBOs, abdominal exam remarkable for distention, and CT scan findings revealing SBO, patient would benefit from non-operative management of SBO.  - NPO/IVFs  - NGT defered per patient, given he no longer had nausea. However, patient advised that if he develops nausea/vomiting, he should have an NGT placed.    Discussed with attending surgeon Dr. Patel Junior.    JOHANNA Al, PGY-2   [facility]   Red Team Surgery   [id number]

## 2020-09-11 NOTE — ED PROVIDER NOTE - PROGRESS NOTE DETAILS
Henrique PGY2 - Pt. signed out to me by Dr. Parrish.  Pending CTr for possible SBO. Henrique PGY2 - Surgery consulted for SBO.  Pt. states he believes he may have passed gas.  No nausea currently. Attending MD Fuentes: received in sign out, CT confirms SBO. General surgery consulted and have seen patient, awaiting final recommendations. Henrique PGY2 - Pt. will be admitted to Patel Junior.  Pt. aware and agrees w/ plan.

## 2020-09-11 NOTE — ED PROVIDER NOTE - CLINICAL SUMMARY MEDICAL DECISION MAKING FREE TEXT BOX
Wallace Parrish, PGY2: 89 year old male PMH recurrent SBO p/w acute onset abd pain since midnight. Abd distended w/ diffusely mild TTP. C/f SBO vs partial obstruction, does not appear to be perforated. Plan for ekg, labs, pain control, CT A/P, UA, reassess.

## 2020-09-11 NOTE — ED PROVIDER NOTE - ATTENDING CONTRIBUTION TO CARE
MD Francis:  patient seen and evaluated personally.   I agree with the History & Physical,  Impression & Plan other than what was detailed in my note.  MD Francis  89 y/o m hx of sbo, recently discharged for thus, presenting to ed w cc of central abd pain, started at 12 am, wke from sleep, feels like prior sbo, assoc w/ mild nausea, not sure when he last passed gas. afebrile vitals stable, non toxic, chest nt, lungs cta b/l ,no w/r/r, abd mildly distended, mild ttp throughout, no rlq ttp. no ruq ttp. plan for labs, ct abd r/o obstruction

## 2020-09-11 NOTE — H&P ADULT - NSHPLABSRESULTS_GEN_ALL_CORE
LABS:                        10.9   8.04  )-----------( 253      ( 11 Sep 2020 05:56 )             33.9     11 Sep 2020 05:56    138    |  106    |  17     ----------------------------<  102    4.2     |  21     |  1.21     Ca    9.0        11 Sep 2020 05:56    TPro  5.7    /  Alb  3.5    /  TBili  0.4    /  DBili  x      /  AST  23     /  ALT  18     /  AlkPhos  79     11 Sep 2020 05:56      CAPILLARY BLOOD GLUCOSE            LIVER FUNCTIONS - ( 11 Sep 2020 05:56 )  Alb: 3.5 g/dL / Pro: 5.7 g/dL / ALK PHOS: 79 U/L / ALT: 18 U/L / AST: 23 U/L / GGT: x               IMAGING:    < from: CT Abdomen and Pelvis w/ IV Cont (09.11.20 @ 06:59) >    FINDINGS:  LOWER CHEST: Small loculated right pleural effusion with associated subsegmental atelectasis. Round atelectasis of the left lung base. Trace pericardial fluid. Surgical clips in the mediastinum.    LIVER: Within normal limits.  BILE DUCTS: Normal caliber.  GALLBLADDER: Within normal limits.  SPLEEN: Within normal limits.  PANCREAS: Diffuse fatty atrophy.  ADRENALS: Within normal limits.  KIDNEYS/URETERS: Kidneys enhance symmetrically without hydronephrosis. Bilateral renal cysts, the largest measuring 7 cm within the right interpolar region.    BLADDER: Within normal limits.  REPRODUCTIVE ORGANS: Prostate is enlarged.    BOWEL: Small bowel obstruction, with transition point in the midline lower pelvis, at the site of a small bowel anastomosis. At the level of the anastomosis there is a large focal stool burden, and a well rounded 1 cm lesion with a high density surrounding ring. There is mural wall thickening of the small bowel proximal and distal to the small bowel anastomosis. These findings are unchanged compared to 8/28/2020.  Appendix is normal.  PERITONEUM: Mild mesenteric edema surrounding dilated small bowel loops. No ascites,pneumoperitoneum, or loculated collection.  VESSELS: Atherosclerotic calcifications of the aortoiliac tree. Normal caliber abdominal aorta.  RETROPERITONEUM/LYMPH NODES: No lymphadenopathy.  ABDOMINAL WALL: Small fat-containing ventral abdominal wallhernias, unchanged. Postsurgical changes.  BONES: Degenerative changes.    IMPRESSION:  Redemonstration of a small bowel obstruction with transition point in the midline lower pelvis, unchanged in appearance compared to 8/28/2020.    < end of copied text >

## 2020-09-11 NOTE — H&P ADULT - HISTORY OF PRESENT ILLNESS
89M w/ hx of CABG, CAD, recurrent SBO s/p SBR for bezoar, subsequent SBR for adhesive SBO (10+ years ago at [facility]), b/l inguinal hernia repair, recent admission for SBO managed non-operatively with spontaneous resolution. Patient now presents to the ED with 1 day history of abdominal pain and nausea.     In the ED, patient was afebrile, hemodynamically stable, labs unremarkable, CT imaging revealing redemonstration of a small bowel obstruction with transition point in the midline lower pelvis, unchanged in appearance compared to previous scan on 8/28/2020. Surgery consulted in light of SBO.    No fevers/chills, chest pain/shortness of breath, or dizziness/lightheadedness.

## 2020-09-11 NOTE — ED PROVIDER NOTE - PHYSICAL EXAMINATION
GENERAL: A&Ox3, non-toxic appearing, no acute distress  HEENT: NCAT, EOMI, oral mucosa moist, normal conjunctiva  RESP: CTAB, no respiratory distress, no wheezes/rhonchi/rales, speaking in full sentences  CV: RRR, no murmurs/rubs/gallops  ABDOMEN: soft, mildly tender diffusely, distended, no guarding  MSK: no visible deformities  NEURO: no focal sensory or motor deficits, MAEx4  SKIN: warm, normal color, well perfused, no rash  PSYCH: normal affect    -Wallace Parrish, PGY2

## 2020-09-11 NOTE — ED ADULT NURSE REASSESSMENT NOTE - NS ED NURSE REASSESS COMMENT FT1
vital signs within normal limits. LR initiated. CT of abd resulted. pt to received an NG tube by resident for obstruction. comfort care provided. will cont to monitor.

## 2020-09-11 NOTE — ED ADULT NURSE NOTE - NSIMPLEMENTINTERV_GEN_ALL_ED
Implemented All Fall Risk Interventions:  [place] to call system. Call bell, personal items and telephone within reach. Instruct patient to call for assistance. Room bathroom lighting operational. Non-slip footwear when patient is off stretcher. Physically safe environment: no spills, clutter or unnecessary equipment. Stretcher in lowest position, wheels locked, appropriate side rails in place. Provide visual cue, wrist band, yellow gown, etc. Monitor gait and stability. Monitor for mental status changes and reorient to person, place, and time. Review medications for side effects contributing to fall risk. Reinforce activity limits and safety measures with patient and family.

## 2020-09-11 NOTE — H&P ADULT - NSHPPHYSICALEXAM_GEN_ALL_CORE
VITAL SIGNS:  Vital Signs Last 24 Hrs  T(C): 36.4 (11 Sep 2020 12:28), Max: 36.6 (11 Sep 2020 05:05)  T(F): 97.5 (11 Sep 2020 12:28), Max: 97.9 (11 Sep 2020 05:05)  HR: 70 (11 Sep 2020 12:28) (70 - 98)  BP: 118/67 (11 Sep 2020 12:28) (118/67 - 149/71)  BP(mean): --  RR: 18 (11 Sep 2020 12:28) (16 - 18)  SpO2: 97% (11 Sep 2020 12:28) (95% - 100%)      PHYSICAL EXAM:    General: NAD, Sitting in bed comfortably  HEENT: NC/AT, EOMI  Neck: Soft, supple  Cardio: RRR, nml S1/S2  Resp: Good effort, CTA b/l  GI/Abd: Soft, distended, mildly tender in epigastric area, no rebound/guarding, no masses palpated  Musculoskeletal: All 4 extremities moving spontaneously, no limitations

## 2020-09-12 LAB
ANION GAP SERPL CALC-SCNC: 12 MMOL/L — SIGNIFICANT CHANGE UP (ref 5–17)
BUN SERPL-MCNC: 12 MG/DL — SIGNIFICANT CHANGE UP (ref 7–23)
CALCIUM SERPL-MCNC: 8.3 MG/DL — LOW (ref 8.4–10.5)
CHLORIDE SERPL-SCNC: 104 MMOL/L — SIGNIFICANT CHANGE UP (ref 96–108)
CO2 SERPL-SCNC: 21 MMOL/L — LOW (ref 22–31)
CREAT SERPL-MCNC: 1.12 MG/DL — SIGNIFICANT CHANGE UP (ref 0.5–1.3)
GLUCOSE SERPL-MCNC: 77 MG/DL — SIGNIFICANT CHANGE UP (ref 70–99)
HCT VFR BLD CALC: 31.9 % — LOW (ref 39–50)
HGB BLD-MCNC: 10.1 G/DL — LOW (ref 13–17)
MAGNESIUM SERPL-MCNC: 1.8 MG/DL — SIGNIFICANT CHANGE UP (ref 1.6–2.6)
MCHC RBC-ENTMCNC: 31.5 PG — SIGNIFICANT CHANGE UP (ref 27–34)
MCHC RBC-ENTMCNC: 31.7 GM/DL — LOW (ref 32–36)
MCV RBC AUTO: 99.4 FL — SIGNIFICANT CHANGE UP (ref 80–100)
NRBC # BLD: 0 /100 WBCS — SIGNIFICANT CHANGE UP (ref 0–0)
PHOSPHATE SERPL-MCNC: 2.8 MG/DL — SIGNIFICANT CHANGE UP (ref 2.5–4.5)
PLATELET # BLD AUTO: 193 K/UL — SIGNIFICANT CHANGE UP (ref 150–400)
POTASSIUM SERPL-MCNC: 4.3 MMOL/L — SIGNIFICANT CHANGE UP (ref 3.5–5.3)
POTASSIUM SERPL-SCNC: 4.3 MMOL/L — SIGNIFICANT CHANGE UP (ref 3.5–5.3)
RBC # BLD: 3.21 M/UL — LOW (ref 4.2–5.8)
RBC # FLD: 14 % — SIGNIFICANT CHANGE UP (ref 10.3–14.5)
SODIUM SERPL-SCNC: 137 MMOL/L — SIGNIFICANT CHANGE UP (ref 135–145)
WBC # BLD: 4.13 K/UL — SIGNIFICANT CHANGE UP (ref 3.8–10.5)
WBC # FLD AUTO: 4.13 K/UL — SIGNIFICANT CHANGE UP (ref 3.8–10.5)

## 2020-09-12 RX ORDER — SODIUM CHLORIDE 9 MG/ML
1000 INJECTION, SOLUTION INTRAVENOUS
Refills: 0 | Status: DISCONTINUED | OUTPATIENT
Start: 2020-09-12 | End: 2020-09-13

## 2020-09-12 RX ORDER — PANTOPRAZOLE SODIUM 20 MG/1
40 TABLET, DELAYED RELEASE ORAL
Refills: 0 | Status: DISCONTINUED | OUTPATIENT
Start: 2020-09-12 | End: 2020-09-13

## 2020-09-12 RX ORDER — TAMSULOSIN HYDROCHLORIDE 0.4 MG/1
0.4 CAPSULE ORAL AT BEDTIME
Refills: 0 | Status: DISCONTINUED | OUTPATIENT
Start: 2020-09-12 | End: 2020-09-13

## 2020-09-12 RX ORDER — ATORVASTATIN CALCIUM 80 MG/1
10 TABLET, FILM COATED ORAL AT BEDTIME
Refills: 0 | Status: DISCONTINUED | OUTPATIENT
Start: 2020-09-12 | End: 2020-09-13

## 2020-09-12 RX ORDER — ACETAMINOPHEN 500 MG
1000 TABLET ORAL ONCE
Refills: 0 | Status: COMPLETED | OUTPATIENT
Start: 2020-09-12 | End: 2020-09-12

## 2020-09-12 RX ORDER — MAGNESIUM SULFATE 500 MG/ML
2 VIAL (ML) INJECTION ONCE
Refills: 0 | Status: COMPLETED | OUTPATIENT
Start: 2020-09-12 | End: 2020-09-12

## 2020-09-12 RX ADMIN — Medication 400 MILLIGRAM(S): at 14:47

## 2020-09-12 RX ADMIN — ENOXAPARIN SODIUM 40 MILLIGRAM(S): 100 INJECTION SUBCUTANEOUS at 12:16

## 2020-09-12 RX ADMIN — Medication 15 MILLIGRAM(S): at 00:40

## 2020-09-12 RX ADMIN — ATORVASTATIN CALCIUM 10 MILLIGRAM(S): 80 TABLET, FILM COATED ORAL at 22:45

## 2020-09-12 RX ADMIN — Medication 15 MILLIGRAM(S): at 01:00

## 2020-09-12 RX ADMIN — Medication 50 GRAM(S): at 17:23

## 2020-09-12 RX ADMIN — TAMSULOSIN HYDROCHLORIDE 0.4 MILLIGRAM(S): 0.4 CAPSULE ORAL at 22:44

## 2020-09-12 RX ADMIN — PANTOPRAZOLE SODIUM 40 MILLIGRAM(S): 20 TABLET, DELAYED RELEASE ORAL at 22:44

## 2020-09-12 RX ADMIN — SODIUM CHLORIDE 100 MILLILITER(S): 9 INJECTION, SOLUTION INTRAVENOUS at 00:40

## 2020-09-12 RX ADMIN — Medication 1000 MILLIGRAM(S): at 14:47

## 2020-09-12 NOTE — PROGRESS NOTE ADULT - ASSESSMENT
89M w/ history of recurrent SBOs, now presenting with another SBO.    PLAN:  - non-operative management of SBO.  - NPO/IVFs  - NGT clamp trial.   - IV protonix  - Dvt ppx    Red team surgery  [id number]

## 2020-09-12 NOTE — PROGRESS NOTE ADULT - SUBJECTIVE AND OBJECTIVE BOX
No acute events reported over the past 24hrs.  Patient seen and examined at bedside. Pleasant and cooperative    Physical Exam  General: NAD  Respiratory: Patent airways.  Abdominal: soft, non distended, no tenderness  Extremities: No CINDI    Vital Signs Last 24 Hrs  T(C): 37.1 (12 Sep 2020 10:49), Max: 37.1 (12 Sep 2020 10:49)  T(F): 98.8 (12 Sep 2020 10:49), Max: 98.8 (12 Sep 2020 10:49)  HR: 69 (12 Sep 2020 10:49) (69 - 77)  BP: 153/66 (12 Sep 2020 10:49) (129/74 - 153/66)  BP(mean): --  RR: 18 (12 Sep 2020 10:49) (18 - 18)  SpO2: 98% (12 Sep 2020 10:49) (96% - 98%)    I&O's Detail    11 Sep 2020 07:01  -  12 Sep 2020 07:00  --------------------------------------------------------  IN:    IV PiggyBack: 100 mL    Lactated Ringers: 1900 mL  Total IN: 2000 mL    OUT:    Nasogastric/Oral tube (mL): 75 mL    Oral Fluid: 0 mL    Voided (mL): 775 mL  Total OUT: 850 mL    Total NET: 1150 mL      12 Sep 2020 07:01  -  12 Sep 2020 13:05  --------------------------------------------------------  IN:  Total IN: 0 mL    OUT:    Oral Fluid: 0 mL  Total OUT: 0 mL    Total NET: 0 mL        09-12    137  |  104  |  12  ----------------------------<  77  4.3   |  21<L>  |  1.12    Ca    8.3<L>      12 Sep 2020 07:10  Phos  2.8     09-12  Mg     1.8     09-12    TPro  5.7<L>  /  Alb  3.5  /  TBili  0.4  /  DBili  x   /  AST  23  /  ALT  18  /  AlkPhos  79  09-11                            10.1   4.13  )-----------( 193      ( 12 Sep 2020 07:13 )             31.9           MEDICATIONS  (STANDING):  enoxaparin Injectable 40 milliGRAM(s) SubCutaneous daily  influenza   Vaccine 0.5 milliLiter(s) IntraMuscular once  lactated ringers. 1000 milliLiter(s) (100 mL/Hr) IV Continuous <Continuous>    MEDICATIONS  (PRN):

## 2020-09-12 NOTE — CHART NOTE - NSCHARTNOTEFT_GEN_A_CORE
Pt passed NGT clamp trial with 0 residue.  Reports that he is passing gas.  Abdomen is soft.  NGT is off.  Started CLD, and was advised to limit the amount of fluids intake.  Case discussed with Dr. BUSCH PGY-5    Dr. VALLEJO PGY-1

## 2020-09-13 ENCOUNTER — TRANSCRIPTION ENCOUNTER (OUTPATIENT)
Age: 85
End: 2020-09-13

## 2020-09-13 VITALS
HEART RATE: 84 BPM | TEMPERATURE: 98 F | RESPIRATION RATE: 18 BRPM | OXYGEN SATURATION: 97 % | SYSTOLIC BLOOD PRESSURE: 129 MMHG | DIASTOLIC BLOOD PRESSURE: 76 MMHG

## 2020-09-13 LAB
ANION GAP SERPL CALC-SCNC: 9 MMOL/L — SIGNIFICANT CHANGE UP (ref 5–17)
BUN SERPL-MCNC: 11 MG/DL — SIGNIFICANT CHANGE UP (ref 7–23)
CALCIUM SERPL-MCNC: 8.4 MG/DL — SIGNIFICANT CHANGE UP (ref 8.4–10.5)
CHLORIDE SERPL-SCNC: 105 MMOL/L — SIGNIFICANT CHANGE UP (ref 96–108)
CO2 SERPL-SCNC: 24 MMOL/L — SIGNIFICANT CHANGE UP (ref 22–31)
CREAT SERPL-MCNC: 1.26 MG/DL — SIGNIFICANT CHANGE UP (ref 0.5–1.3)
GLUCOSE SERPL-MCNC: 103 MG/DL — HIGH (ref 70–99)
HCT VFR BLD CALC: 30.3 % — LOW (ref 39–50)
HGB BLD-MCNC: 9.6 G/DL — LOW (ref 13–17)
MAGNESIUM SERPL-MCNC: 2.4 MG/DL — SIGNIFICANT CHANGE UP (ref 1.6–2.6)
MCHC RBC-ENTMCNC: 31.3 PG — SIGNIFICANT CHANGE UP (ref 27–34)
MCHC RBC-ENTMCNC: 31.7 GM/DL — LOW (ref 32–36)
MCV RBC AUTO: 98.7 FL — SIGNIFICANT CHANGE UP (ref 80–100)
NRBC # BLD: 0 /100 WBCS — SIGNIFICANT CHANGE UP (ref 0–0)
PHOSPHATE SERPL-MCNC: 2.7 MG/DL — SIGNIFICANT CHANGE UP (ref 2.5–4.5)
PLATELET # BLD AUTO: 206 K/UL — SIGNIFICANT CHANGE UP (ref 150–400)
POTASSIUM SERPL-MCNC: 4.1 MMOL/L — SIGNIFICANT CHANGE UP (ref 3.5–5.3)
POTASSIUM SERPL-SCNC: 4.1 MMOL/L — SIGNIFICANT CHANGE UP (ref 3.5–5.3)
RBC # BLD: 3.07 M/UL — LOW (ref 4.2–5.8)
RBC # FLD: 13.7 % — SIGNIFICANT CHANGE UP (ref 10.3–14.5)
SODIUM SERPL-SCNC: 138 MMOL/L — SIGNIFICANT CHANGE UP (ref 135–145)
WBC # BLD: 4.07 K/UL — SIGNIFICANT CHANGE UP (ref 3.8–10.5)
WBC # FLD AUTO: 4.07 K/UL — SIGNIFICANT CHANGE UP (ref 3.8–10.5)

## 2020-09-13 PROCEDURE — 74177 CT ABD & PELVIS W/CONTRAST: CPT

## 2020-09-13 PROCEDURE — 80053 COMPREHEN METABOLIC PANEL: CPT

## 2020-09-13 PROCEDURE — 83605 ASSAY OF LACTIC ACID: CPT

## 2020-09-13 PROCEDURE — 85025 COMPLETE CBC W/AUTO DIFF WBC: CPT

## 2020-09-13 PROCEDURE — 84100 ASSAY OF PHOSPHORUS: CPT

## 2020-09-13 PROCEDURE — U0003: CPT

## 2020-09-13 PROCEDURE — 83690 ASSAY OF LIPASE: CPT

## 2020-09-13 PROCEDURE — 83735 ASSAY OF MAGNESIUM: CPT

## 2020-09-13 PROCEDURE — 80048 BASIC METABOLIC PNL TOTAL CA: CPT

## 2020-09-13 PROCEDURE — 81003 URINALYSIS AUTO W/O SCOPE: CPT

## 2020-09-13 PROCEDURE — 85027 COMPLETE CBC AUTOMATED: CPT

## 2020-09-13 PROCEDURE — 71045 X-RAY EXAM CHEST 1 VIEW: CPT

## 2020-09-13 RX ADMIN — ENOXAPARIN SODIUM 40 MILLIGRAM(S): 100 INJECTION SUBCUTANEOUS at 11:33

## 2020-09-13 RX ADMIN — PANTOPRAZOLE SODIUM 40 MILLIGRAM(S): 20 TABLET, DELAYED RELEASE ORAL at 05:12

## 2020-09-13 NOTE — PROGRESS NOTE ADULT - SUBJECTIVE AND OBJECTIVE BOX
Interval Events: NGT removed yesterday    S: Patient doing well, states he is tolerating CLD and feels better today. Endorses passing flatus but no BM yet.  Denies fevers, chills, nausea, emesis, chest pain, SOB.    O: Vital Signs  T(C): 36.7 (09-13 @ 05:10), Max: 37.1 (09-12 @ 10:49)  HR: 80 (09-13 @ 05:10) (60 - 88)  BP: 122/56 (09-13 @ 05:10) (122/56 - 153/66)  RR: 18 (09-13 @ 05:10) (18 - 18)  SpO2: 96% (09-13 @ 05:10) (95% - 98%)  09-12-20 @ 07:01  -  09-13-20 @ 07:00  --------------------------------------------------------  IN: 1200 mL / OUT: 1205 mL / NET: -5 mL      General: alert and oriented, NAD  Resp: airway patent, respirations unlabored  CVS: appears well perfused  Abdomen: soft, nontender, mildly distended  Extremities: no edema  Skin: warm, dry, appropriate color                          9.6    4.07  )-----------( 206      ( 13 Sep 2020 07:18 )             30.3   09-13    138  |  105  |  11  ----------------------------<  103<H>  4.1   |  24  |  1.26    Ca    8.4      13 Sep 2020 07:15  Phos  2.7     09-13  Mg     2.4     09-13

## 2020-09-13 NOTE — DISCHARGE NOTE PROVIDER - NSDCCPCAREPLAN_GEN_ALL_CORE_FT
PRINCIPAL DISCHARGE DIAGNOSIS  Diagnosis: SBO (small bowel obstruction)  Assessment and Plan of Treatment: - Please schedule your follow up visit with Dr Junior at his outpatient office in 1-2 weeks. [phone]  - Please contact Dr Junior's office with any questions/concerns or if you experience any increasing pain, nausea, vomiting, fevers or chills.

## 2020-09-13 NOTE — DISCHARGE NOTE PROVIDER - NSDCFUSCHEDAPPT_GEN_ALL_CORE_FT
CHER GEORGE ; 09/15/2020 ; NPP Cardio [address] [facility]  CHER GEORGE ; 10/29/2020 ; NPP Derm [address]

## 2020-09-13 NOTE — PROGRESS NOTE ADULT - ASSESSMENT
89yo M w/ history of recurrent SBOs, now presenting with another SBO.    PLAN:  - Non-operative management of SBO  - Advance to low fiber diet  - IV protonix  - Dvt ppx  - Monitor GI fxn  - OOB/ambulatory    RED TEAM SURGERY [id number]

## 2020-09-13 NOTE — PROGRESS NOTE ADULT - ASSESSMENT
89 year old man with recurrent SBO, resolving    - advance to LRD  - OOB/amb  - monitor GIF  - Pt d/w Dr. Junior 89 year old man with recurrent SBO, resolving    - advance to LRD  - OOB/amb  - monitor GIF  - dc planning  - Pt d/w Dr. Junior

## 2020-09-13 NOTE — DISCHARGE NOTE PROVIDER - HOSPITAL COURSE
89yo M w/ hx of CABG, CAD, recurrent SBO s/p SBR for bezoar, subsequent SBR for adhesive SBO (10+ years ago at [facility]), b/l inguinal hernia repair, recent admission for SBO managed non-operatively with spontaneous resolution. Patient p/w 1 day history of abdominal pain and nausea. In the ED, patient was afebrile, hemodynamically stable, labs unremarkable, CT imaging revealing redemonstration of a small bowel obstruction with transition point in the midline lower pelvis, unchanged in appearance compared to previous scan on 8/28/2020.  9/11: Pt admitted for SBO  9/12: NGT clamp trial/DC'd  9/13: Advanced to regular diet; DC home today

## 2020-09-13 NOTE — DISCHARGE NOTE NURSING/CASE MANAGEMENT/SOCIAL WORK - PATIENT PORTAL LINK FT
You can access the FollowMyHealth Patient Portal offered by [facility] by registering at the following website: [email or web address]. By joining [email or web address]’s FollowMyHealth portal, you will also be able to view your health information using other applications (apps) compatible with our system.

## 2020-09-13 NOTE — DISCHARGE NOTE PROVIDER - CARE PROVIDER_API CALL
Patel Junior)  Surgery  [address], [address]  [place] [address]  Phone: [phone]  Fax: [phone]  Follow Up Time: 2 weeks

## 2020-09-13 NOTE — PROGRESS NOTE ADULT - SUBJECTIVE AND OBJECTIVE BOX
INTERVAL HPI/OVERNIGHT EVENTS: Pt seen and examined. Doing well. Tolerating CLD, denies N/V. Denies abd pain. Passing flatus, denies BM    MEDICATIONS  (STANDING):  atorvastatin 10 milliGRAM(s) Oral at bedtime  dextrose 5% + sodium chloride 0.45%. 1000 milliLiter(s) (50 mL/Hr) IV Continuous <Continuous>  enoxaparin Injectable 40 milliGRAM(s) SubCutaneous daily  influenza   Vaccine 0.5 milliLiter(s) IntraMuscular once  pantoprazole    Tablet 40 milliGRAM(s) Oral before breakfast  tamsulosin 0.4 milliGRAM(s) Oral at bedtime    Vital Signs Last 24 Hrs  T(C): 36.7 (13 Sep 2020 05:10), Max: 37.1 (12 Sep 2020 10:49)  T(F): 98 (13 Sep 2020 05:10), Max: 98.8 (12 Sep 2020 10:49)  HR: 80 (13 Sep 2020 05:10) (60 - 88)  BP: 122/56 (13 Sep 2020 05:10) (122/56 - 153/66)  BP(mean): --  RR: 18 (13 Sep 2020 05:10) (18 - 18)  SpO2: 96% (13 Sep 2020 05:10) (95% - 98%)    PHYSICAL EXAM:  Constitutional: NAD  Gastrointestinal: Abd softly distended, nontender      I&O's Detail    12 Sep 2020 07:01  -  13 Sep 2020 07:00  --------------------------------------------------------  IN:    dextrose 5% + sodium chloride 0.45%: 150 mL    IV PiggyBack: 100 mL    IV PiggyBack: 50 mL    Lactated Ringers: 900 mL  Total IN: 1200 mL    OUT:    Nasogastric/Oral tube (mL): 5 mL    Oral Fluid: 0 mL    Voided (mL): 1200 mL  Total OUT: 1205 mL    Total NET: -5 mL          LABS:                        9.6    4.07  )-----------( 206      ( 13 Sep 2020 07:18 )             30.3     09-13    138  |  105  |  11  ----------------------------<  103<H>  4.1   |  24  |  1.26    Ca    8.4      13 Sep 2020 07:15  Phos  2.7     09-13  Mg     2.4     09-13        Urinalysis Basic - ( 11 Sep 2020 12:32 )    Color: Light Yellow / Appearance: Clear / SG: >1.050 / pH: x  Gluc: x / Ketone: Negative  / Bili: Negative / Urobili: Negative   Blood: x / Protein: Trace / Nitrite: Negative   Leuk Esterase: Negative / RBC: x / WBC x   Sq Epi: x / Non Sq Epi: x / Bacteria: x        RADIOLOGY & ADDITIONAL STUDIES:

## 2020-09-15 ENCOUNTER — APPOINTMENT (OUTPATIENT)
Dept: CARDIOLOGY | Facility: CLINIC | Age: 85
End: 2020-09-15
Payer: MEDICARE

## 2020-09-15 ENCOUNTER — NON-APPOINTMENT (OUTPATIENT)
Age: 85
End: 2020-09-15

## 2020-09-15 VITALS
TEMPERATURE: 97.8 F | HEART RATE: 72 BPM | HEIGHT: 65 IN | SYSTOLIC BLOOD PRESSURE: 120 MMHG | WEIGHT: 129 LBS | DIASTOLIC BLOOD PRESSURE: 60 MMHG | OXYGEN SATURATION: 95 % | BODY MASS INDEX: 21.49 KG/M2

## 2020-09-15 PROCEDURE — 99214 OFFICE O/P EST MOD 30 MIN: CPT

## 2020-09-15 PROCEDURE — 93000 ELECTROCARDIOGRAM COMPLETE: CPT

## 2020-09-15 RX ORDER — FUROSEMIDE 20 MG/1
20 TABLET ORAL DAILY
Qty: 30 | Refills: 3 | Status: DISCONTINUED | COMMUNITY
Start: 2020-05-08 | End: 2020-09-15

## 2020-09-15 NOTE — HISTORY OF PRESENT ILLNESS
[FreeTextEntry1] : He has been taking ranexa 1/2 tab bid\par Had episodes of lightheadedness with some associated orthostasis.\par Recently hospitalized for SBO.\par Worsening exertional dyspnea upstairs. \par \par Flomax bid make him dizzy.\par \par

## 2020-09-15 NOTE — DISCUSSION/SUMMARY
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 No angina. No ECG changes. Nuclear stress test was normal.\par Symptoms are likely related to deconditioning.\par Continue off Lasix.\par Continue ASA and statin.\par No changes to his anginal regimen.\par See me in 3 months.

## 2020-09-15 NOTE — REVIEW OF SYSTEMS
[see HPI] : see HPI [Dyspnea on exertion] : not dyspnea during exertion [Shortness Of Breath] : no shortness of breath [Lower Ext Edema] : no extremity edema [Palpitations] : no palpitations [Negative] : Endocrine

## 2020-09-15 NOTE — PHYSICAL EXAM
[General Appearance - Well Developed] : well developed [General Appearance - In No Acute Distress] : no acute distress [General Appearance - Well Nourished] : well nourished [No Oral Pallor] : no oral pallor [Normal Conjunctiva] : the conjunctiva exhibited no abnormalities [Normal Jugular Venous V Waves Present] : normal jugular venous V waves present [Respiration, Rhythm And Depth] : normal respiratory rhythm and effort [Heart Sounds] : normal S1 and S2 [Auscultation Breath Sounds / Voice Sounds] : lungs were clear to auscultation bilaterally [Murmurs] : no murmurs present [Arterial Pulses Normal] : the arterial pulses were normal [Edema] : no peripheral edema present [Regular] : the rhythm was regular [Bowel Sounds] : normal bowel sounds [FreeTextEntry1] : 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 [Abdomen Soft] : soft [Abnormal Walk] : normal gait [Cyanosis, Localized] : no localized cyanosis [Skin Turgor] : normal skin turgor [Oriented To Time, Place, And Person] : oriented to person, place, and time [Impaired Insight] : insight and judgment were intact [Affect] : the affect was normal

## 2020-10-02 ENCOUNTER — APPOINTMENT (OUTPATIENT)
Dept: PULMONOLOGY | Facility: CLINIC | Age: 85
End: 2020-10-02
Payer: MEDICARE

## 2020-10-02 VITALS
DIASTOLIC BLOOD PRESSURE: 84 MMHG | HEIGHT: 60 IN | TEMPERATURE: 97.3 F | SYSTOLIC BLOOD PRESSURE: 130 MMHG | HEART RATE: 88 BPM | WEIGHT: 128.31 LBS | RESPIRATION RATE: 16 BRPM | BODY MASS INDEX: 25.19 KG/M2

## 2020-10-02 DIAGNOSIS — Z23 ENCOUNTER FOR IMMUNIZATION: ICD-10-CM

## 2020-10-02 PROCEDURE — G0008: CPT

## 2020-10-02 PROCEDURE — 90662 IIV NO PRSV INCREASED AG IM: CPT

## 2020-10-02 PROCEDURE — 90732 PPSV23 VACC 2 YRS+ SUBQ/IM: CPT

## 2020-10-02 PROCEDURE — G0009: CPT

## 2020-10-04 ENCOUNTER — EMERGENCY (EMERGENCY)
Facility: HOSPITAL | Age: 85
LOS: 1 days | Discharge: HOME | End: 2020-10-04
Attending: EMERGENCY MEDICINE
Payer: MEDICARE

## 2020-10-04 VITALS
SYSTOLIC BLOOD PRESSURE: 118 MMHG | HEART RATE: 74 BPM | RESPIRATION RATE: 18 BRPM | DIASTOLIC BLOOD PRESSURE: 60 MMHG | TEMPERATURE: 98 F | OXYGEN SATURATION: 99 %

## 2020-10-04 VITALS
WEIGHT: 123.9 LBS | RESPIRATION RATE: 16 BRPM | SYSTOLIC BLOOD PRESSURE: 118 MMHG | HEIGHT: 66 IN | DIASTOLIC BLOOD PRESSURE: 69 MMHG | TEMPERATURE: 98 F | OXYGEN SATURATION: 98 % | HEART RATE: 102 BPM

## 2020-10-04 DIAGNOSIS — Z95.1 PRESENCE OF AORTOCORONARY BYPASS GRAFT: Chronic | ICD-10-CM

## 2020-10-04 LAB
ALBUMIN SERPL ELPH-MCNC: 3.5 G/DL — SIGNIFICANT CHANGE UP (ref 3.3–5)
ALP SERPL-CCNC: 72 U/L — SIGNIFICANT CHANGE UP (ref 40–120)
ALT FLD-CCNC: 12 U/L — SIGNIFICANT CHANGE UP (ref 10–45)
ANION GAP SERPL CALC-SCNC: 11 MMOL/L — SIGNIFICANT CHANGE UP (ref 5–17)
APTT BLD: 30.7 SEC — SIGNIFICANT CHANGE UP (ref 27.5–35.5)
AST SERPL-CCNC: 16 U/L — SIGNIFICANT CHANGE UP (ref 10–40)
BASOPHILS # BLD AUTO: 0.03 K/UL — SIGNIFICANT CHANGE UP (ref 0–0.2)
BASOPHILS NFR BLD AUTO: 0.6 % — SIGNIFICANT CHANGE UP (ref 0–2)
BILIRUB SERPL-MCNC: 0.6 MG/DL — SIGNIFICANT CHANGE UP (ref 0.2–1.2)
BUN SERPL-MCNC: 16 MG/DL — SIGNIFICANT CHANGE UP (ref 7–23)
CALCIUM SERPL-MCNC: 8.8 MG/DL — SIGNIFICANT CHANGE UP (ref 8.4–10.5)
CHLORIDE SERPL-SCNC: 107 MMOL/L — SIGNIFICANT CHANGE UP (ref 96–108)
CO2 SERPL-SCNC: 21 MMOL/L — LOW (ref 22–31)
CREAT SERPL-MCNC: 1.22 MG/DL — SIGNIFICANT CHANGE UP (ref 0.5–1.3)
EOSINOPHIL # BLD AUTO: 0.12 K/UL — SIGNIFICANT CHANGE UP (ref 0–0.5)
EOSINOPHIL NFR BLD AUTO: 2.3 % — SIGNIFICANT CHANGE UP (ref 0–6)
GLUCOSE SERPL-MCNC: 90 MG/DL — SIGNIFICANT CHANGE UP (ref 70–99)
HCT VFR BLD CALC: 34.7 % — LOW (ref 39–50)
HGB BLD-MCNC: 11.5 G/DL — LOW (ref 13–17)
IMM GRANULOCYTES NFR BLD AUTO: 0.4 % — SIGNIFICANT CHANGE UP (ref 0–1.5)
INR BLD: 1.09 RATIO — SIGNIFICANT CHANGE UP (ref 0.88–1.16)
LIDOCAIN IGE QN: 12 U/L — SIGNIFICANT CHANGE UP (ref 7–60)
LYMPHOCYTES # BLD AUTO: 0.97 K/UL — LOW (ref 1–3.3)
LYMPHOCYTES # BLD AUTO: 18.6 % — SIGNIFICANT CHANGE UP (ref 13–44)
MCHC RBC-ENTMCNC: 32.4 PG — SIGNIFICANT CHANGE UP (ref 27–34)
MCHC RBC-ENTMCNC: 33.1 GM/DL — SIGNIFICANT CHANGE UP (ref 32–36)
MCV RBC AUTO: 97.7 FL — SIGNIFICANT CHANGE UP (ref 80–100)
MONOCYTES # BLD AUTO: 0.68 K/UL — SIGNIFICANT CHANGE UP (ref 0–0.9)
MONOCYTES NFR BLD AUTO: 13.1 % — SIGNIFICANT CHANGE UP (ref 2–14)
NEUTROPHILS # BLD AUTO: 3.39 K/UL — SIGNIFICANT CHANGE UP (ref 1.8–7.4)
NEUTROPHILS NFR BLD AUTO: 65 % — SIGNIFICANT CHANGE UP (ref 43–77)
NRBC # BLD: 0 /100 WBCS — SIGNIFICANT CHANGE UP (ref 0–0)
PLATELET # BLD AUTO: 216 K/UL — SIGNIFICANT CHANGE UP (ref 150–400)
POTASSIUM SERPL-MCNC: 4 MMOL/L — SIGNIFICANT CHANGE UP (ref 3.5–5.3)
POTASSIUM SERPL-SCNC: 4 MMOL/L — SIGNIFICANT CHANGE UP (ref 3.5–5.3)
PROT SERPL-MCNC: 5.5 G/DL — LOW (ref 6–8.3)
PROTHROM AB SERPL-ACNC: 13 SEC — SIGNIFICANT CHANGE UP (ref 10.6–13.6)
RBC # BLD: 3.55 M/UL — LOW (ref 4.2–5.8)
RBC # FLD: 14.2 % — SIGNIFICANT CHANGE UP (ref 10.3–14.5)
SODIUM SERPL-SCNC: 139 MMOL/L — SIGNIFICANT CHANGE UP (ref 135–145)
WBC # BLD: 5.21 K/UL — SIGNIFICANT CHANGE UP (ref 3.8–10.5)
WBC # FLD AUTO: 5.21 K/UL — SIGNIFICANT CHANGE UP (ref 3.8–10.5)

## 2020-10-04 PROCEDURE — 99284 EMERGENCY DEPT VISIT MOD MDM: CPT

## 2020-10-04 PROCEDURE — 83690 ASSAY OF LIPASE: CPT

## 2020-10-04 PROCEDURE — 80053 COMPREHEN METABOLIC PANEL: CPT

## 2020-10-04 PROCEDURE — 85025 COMPLETE CBC W/AUTO DIFF WBC: CPT

## 2020-10-04 PROCEDURE — 74177 CT ABD & PELVIS W/CONTRAST: CPT | Mod: 26

## 2020-10-04 PROCEDURE — 74177 CT ABD & PELVIS W/CONTRAST: CPT

## 2020-10-04 PROCEDURE — 85730 THROMBOPLASTIN TIME PARTIAL: CPT

## 2020-10-04 PROCEDURE — 99284 EMERGENCY DEPT VISIT MOD MDM: CPT | Mod: 25

## 2020-10-04 PROCEDURE — 85610 PROTHROMBIN TIME: CPT

## 2020-10-04 RX ORDER — SODIUM CHLORIDE 9 MG/ML
500 INJECTION INTRAMUSCULAR; INTRAVENOUS; SUBCUTANEOUS ONCE
Refills: 0 | Status: COMPLETED | OUTPATIENT
Start: 2020-10-04 | End: 2020-10-04

## 2020-10-04 RX ADMIN — SODIUM CHLORIDE 500 MILLILITER(S): 9 INJECTION INTRAMUSCULAR; INTRAVENOUS; SUBCUTANEOUS at 09:52

## 2020-10-04 NOTE — CONSULT NOTE ADULT - SUBJECTIVE AND OBJECTIVE BOX
ACS Surgery Consult Note    HPI:  89M w/ hx of CABG, CAD, recurrent SBO s/p SBR for bezoar, subsequent SBR for adhesive SBO (10+ years ago at [facility]), b/l inguinal hernia repair (>10years ago), recent admission for SBO managed non-operatively (9/11-9/13) with spontaneous resolution. Patient now presents to the ED w/ 1 day history of abdominal pain.  Pt reports that last meal was 6AM on Saturday and he presented to the ED at 3Am as he was continuously having pain.      In the ED, patient was afebrile, hemodynamically stable, labs unremarkable, CT imaging revealing redemonstration of a low graed small bowel obstruction w/ chronic ileitis. Since coming to the ED the patient has had 4-5 wattery BMs and says he feels less distended and is no longer havingpain.       Pt denies any nausea or vomiting chest pain/shortness of breath, or dizziness/lightheadedness      PMH  Gastric motility disorder    Small bowel obstruction due to adhesions    Angina of effort    Hyperlipidemia, unspecified hyperlipidemia type    GERD (gastroesophageal reflux disease)    S/P small bowel resection    Acid reflux    Hx of benign prostatic hypertrophy    Essential hypertension, benign    CAD (coronary artery disease) of bypass graft    CAD S/P percutaneous coronary angioplasty      PSH  S/P CABG x 2    S/P small bowel resection    Acid reflux      MEDS    Home Medications:  Aspirin Enteric Coated 81 mg oral delayed release tablet: 1 tab(s) orally once a day (28 Aug 2020 06:21)  atorvastatin 10 mg oral tablet: 1 tab(s) orally once a day (28 Aug 2020 06:21)  Avodart 0.5 mg oral capsule: 1 cap(s) orally once a day (28 Aug 2020 06:21)  Flomax 0.4 mg oral capsule: orally 2 times a day (28 Aug 2020 06:21)  gabapentin 100 mg oral tablet: orally once a day (28 Aug 2020 06:21)  Lasix: 10 milligram(s) orally every other day  (01 Sep 2020 09:52)  Ranexa 500 mg oral tablet, extended release: 1 tab(s) orally 2 times a day (28 Aug 2020 06:21)    Allergies    Cipro (Rash)  penicillins (Unknown)    Physical Exam  T(C): 36.7 (10-04-20 @ 08:41), Max: 36.7 (10-04-20 @ 08:41)  HR: 84 (10-04-20 @ 08:41) (84 - 102)  BP: 144/70 (10-04-20 @ 08:41) (118/69 - 151/70)  RR: 17 (10-04-20 @ 08:41) (16 - 17)  SpO2: 98% (10-04-20 @ 08:41) (98% - 98%)  Wt(kg): --  Tmax: T(C): , Max: 36.7 (10-04-20 @ 08:41)  Wt(kg): --    Gen: NAD  HEENT: normocephalic, atraumatic, no scleral icterus  CV: S1, S2, RRR  Pulm: CTA B/L  Abd: Soft, mildly distended non tender, rebound, no guarding, no palpable organomegaly/masses  Ext: warm, no edema, palp dp/pt        Labs:                        11.5   5.21  )-----------( 216      ( 04 Oct 2020 06:13 )             34.7     10-04    139  |  107  |  16  ----------------------------<  90  4.0   |  21<L>  |  1.22    Ca    8.8      04 Oct 2020 06:13    TPro  5.5<L>  /  Alb  3.5  /  TBili  0.6  /  DBili  x   /  AST  16  /  ALT  12  /  AlkPhos  72  10-04    PT/INR - ( 04 Oct 2020 06:13 )   PT: 13.0 sec;   INR: 1.09 ratio         PTT - ( 04 Oct 2020 06:13 )  PTT:30.7 sec          Imaging  EXAM:  CT ABDOMEN AND PELVIS IC                            PROCEDURE DATE:  10/04/2020            INTERPRETATION:  CLINICAL INFORMATION: History of recurrent bowel obstructions. Abdominal pain. Evaluate for bowel obstruction.    COMPARISON: CT abdomen and pelvis 9/11/2020.    PROCEDURE:  CT of the Abdomen and Pelvis was performed with intravenous contrast.  Intravenous contrast: 90 ml Omnipaque 350. 10 ml discarded.  Oral contrast: None.  Sagittal and coronal reformats were performed.    FINDINGS:  LOWER CHEST: Small right pleural effusion with pleural thickening, unchanged. Bibasilar round atelectasis, unchanged. Redemonstration of tree-in-bud opacities, which may be related to mucoid impaction. Median sternotomy.    LIVER: Within normal limits.  BILE DUCTS: Normal caliber.  GALLBLADDER: Within normal limits.  SPLEEN: Within normal limits.  PANCREAS: Diffuse fatty infiltration.  ADRENALS: Within normal limits.  KIDNEYS/URETERS: Bilateral renal cysts.    BLADDER: Within normal limits.  REPRODUCTIVE ORGANS: Prostate is enlarged.    BOWEL: Chronic mural thickening of the terminal/distal ileum proximal and distal to the small bowel anastomosis in the anterior pelvis with mild distention of a few small bowel loops proximally. Small calcified focus at the dilated anastomotic pouch, likely a fecalith. Appendix is normal.  PERITONEUM: No ascites or free air.  VESSELS: Within normal limits.  RETROPERITONEUM/LYMPH NODES: No lymphadenopathy.  ABDOMINAL WALL: Fat-containing ventral hernias.  BONES: Degenerative changes.    IMPRESSION:  Chronic ileitis with proximal low-grade small bowel obstruction, similar to prior exam.

## 2020-10-04 NOTE — CONSULT NOTE ADULT - ASSESSMENT
89M w/ hx of CABG, CAD, recurrent SBO s/p SBR for bezoar, subsequent SBR for adhesive SBO (10+ years ago at [facility]), b/l inguinal hernia repair (>10years ago),and multiple admissions for SBO's presents with a SBO who is know having bowel movements and feels less distended    - Would recommend PO challenge  - No surgical intervention required  - Will continue to follow    ACS Surgery  [id number]

## 2020-10-04 NOTE — ED PROVIDER NOTE - PHYSICAL EXAMINATION
Gen: well appearing elderly male   HEENT: NCAT, EOMI, no nasal discharge, mucous membranes moist  CV: RRR, +S1/S2, no M/R/G  Resp: CTAB, no W/R/R  GI: Abdomen soft +distended, +mildly TTP b/l lower quadrants no rebound or rigidity   MSK: No open wounds, no bruising, no LE edema  Neuro: A&Ox4, following commands, moving all four extremities spontaneously  Psych: appropriate mood Gen: well appearing elderly male   HEENT: NCAT, EOMI, no nasal discharge, mucous membranes moist  CV: RRR, +S1/S2, no M/R/G  Resp: CTAB, no W/R/R  GI: Abdomen soft +distended, +mildly TTP b/l lower quadrants no rebound or rigidity   MSK: No open wounds, no bruising, no LE edema  Neuro: A&Ox4, following commands, moving all four extremities spontaneously  Psych: appropriate mood  **ATTENDING ADDENDUM (Dr. Ruben Dent): I have reviewed and substantially contributed to the elements of the PE as documented above. I have directly performed an examination of this patient in conjunction with the other members (EM resident/PA/NP) of the patient care team. I have personally reviewed the patient's vital signs at the time of the patient's initial presentation to the ED and repeatedly throughout the ED course.

## 2020-10-04 NOTE — ED PROVIDER NOTE - CARE PLAN
Principal Discharge DX:	Abdominal pain   Principal Discharge DX:	Abdominal pain  Goal:	**ATTENDING ADDENDUM (Dr. Ruben Dent): Goals of care include resolution of emergent/urgent symptoms and concerns, and restoration to baseline level of homeostasis.

## 2020-10-04 NOTE — ED PROVIDER NOTE - CONTEXT
unknown/**ATTENDING ADDENDUM (Dr. Ruben Dent): POSITIVE prior history of bezoar; POSITIVE prior history of small bowel obstruction

## 2020-10-04 NOTE — ED PROVIDER NOTE - SKIN, MLM
Skin normal color for race, warm, dry and intact. No evidence of rash. **ATTENDING ADDENDUM (Dr. Ruben Dent): NO rashes, lesions, ulcers, vesicles, erythema, streaking, lymphangitic spread, crepitus, cellulitis, petechiae, purpurae, track marks or ecchymoses.

## 2020-10-04 NOTE — ED PROVIDER NOTE - NS ED ROS FT
**ATTENDING ADDENDUM (Dr. Ruben Dent): During my interview with the patient, I have personally obtained and/or have directly verified the elements in the past medical/surgical history and other histories as noted earlier in the EMR, in conjunction with the other members (EM resident/PA/NP) of the patient care team. I have also personally obtained and/or have directly verified/reviewed the review of systems as documented below, in conjunction with the other members (EM resident/PA/NP) of the patient care team.

## 2020-10-04 NOTE — ED PROVIDER NOTE - AGGRAVATING FACTORS
**ATTENDING ADDENDUM (Dr. Ruben Dent): NO movement-associated, pleuritic, reproducible, positional, or exertional component to the symptoms.

## 2020-10-04 NOTE — ED ADULT NURSE REASSESSMENT NOTE - NS ED NURSE REASSESS COMMENT FT1
Pt received from night coverage RN [place]. Pt A&Ox4, JORDI, endorses feeling better than on arrival, VSS. Safety maintained, will continue to monitor.

## 2020-10-04 NOTE — ED PROVIDER NOTE - ATTENDING CONTRIBUTION TO CARE
**ATTENDING ADDENDUM (Dr. Ruben Dent): I attest that I have directly examined this patient and reviewed and formulated the diagnostic and therapeutic management plan in collaboration with the EM resident. Please see MDM note and remainder of EMR for findings from CC, HPI, ROS, and PE. (Ford)

## 2020-10-04 NOTE — ED PROVIDER NOTE - CLINICAL SUMMARY MEDICAL DECISION MAKING FREE TEXT BOX
89y/o M PMH CAD s/p CABG, recurrent SBO s/p SBR (10+ years ago at [facility]), b/l inguinal hernia repair, recent admission for SBO managed non-operatively with spontaneous resolution discharged 2 weeks ago, presents with lower abdominal pain x1 day and constipation, had 2 loose BMs and passing flatus in ED but still +TTP b/l lower quadrants and distended. ddx bowel obstruction, also considered colitis, less suspicious ileus, pancreatitis. plan labs CTAP 89y/o M PMH CAD s/p CABG, recurrent SBO s/p SBR (10+ years ago at [facility]), b/l inguinal hernia repair, recent admission for SBO managed non-operatively with spontaneous resolution discharged 2 weeks ago, presents with lower abdominal pain x1 day and constipation, had 2 loose BMs and passing flatus in ED but still +TTP b/l lower quadrants and distended. ddx bowel obstruction, also considered colitis, less suspicious ileus, pancreatitis. plan labs CTAP  **ATTENDING MEDICAL DECISION MAKING/SYNTHESIS (Dr. Ruben Dent): I have reviewed the Chief Concern(s), the HPI, the ROS, and have directly performed and confirmed the findings on the Physical Examination. I have reviewed the medical decision making with all providers, as applicable. The PROBLEM REPRESENTATION at this time is: 89-year-old man with a past medical/surgical history of Coronary Artery Disease s/p CABG, recurrent small bowel obstruction s/p small bowel resectio (secondary to bezoar) and bilateral inguinal hernia repair, s/p recent admission for small bowel obstruction (managed non-operatively with spontaneous resolution) presents with lower abdominal pain and mild constipation over the past day. The MOST LIKELY DIAGNOSIS, and the LIST OF DIFFERENTIAL DIAGNOSES, includes (but is not limited to) the following: small bowel obstruction, large bowel obstruction, other surgical abdominal process e.g. acute biliary disease (e.g. cholelithiasis, cholecystitis, or cholangitis), acute appendicitis, abscess, diverticulitis/colitis, serious bacterial infection or sepsis/severe sepsis e.g. urinary tract infection, pyelonephritis, or equivalent, perforation, peritonitis, dehydration, electrolyte-metabolic-endocrine derangements, urologic cause e.g. obstructing ureteral stone, vascular cause e.g. AAA, dissection or equivalent, gastritis, gastroenteritis, musculoskeletal pain. The likelihood of each of these diagnoses has been appropriately considered in the context of this patient's presentation and my evaluation. PLAN: as described in EMR, including diagnostics, therapeutics and consultation as clinically warranted. I will continue to reevaluate the patient, including the results of all testing, and monitor response to therapy throughout the patient's course in the ED. The care of this patient was in support of the [place] countermeasures to COVID-19.

## 2020-10-04 NOTE — ED PROVIDER NOTE - PATIENT PORTAL LINK FT
You can access the FollowMyHealth Patient Portal offered by [facility] by registering at the following website: [email or web address]. By joining "[employer]’s FollowMyHealth portal, you will also be able to view your health information using other applications (apps) compatible with our system.

## 2020-10-04 NOTE — ED PROVIDER NOTE - RESPIRATORY, MLM
Breath sounds clear and equal bilaterally. **ATTENDING ADDENDUM (Dr. Ruben Dent): NO wheezing, rales, rhonchi, crackles, stridor, drooling, retractions, nasal flaring, or tripoding.

## 2020-10-04 NOTE — ED ADULT NURSE NOTE - OBJECTIVE STATEMENT
Patient presented to ED ambulatory with daughter from triage c/o severe abdominal pain since yesterday morning, [facility] after being admitted 3 days for a bowel obstruction resolved with NGT insertion. A weeks prior to that admission [facility] and diagnosed with bowel obstruction resolved also by NGT. Patient had 2 abdominal surgeries 20 years ago as well as OHS and stent placement, also about 20 years ago. Patient went to the bathroom before ED MD and RN saw patient. After returning from the bathroom patient stated pain is gone and he passed gas and had a loose stool. Patient stating "feeling much better". Complaining also along with the pain at home of "a little nausea", denies vomiting. Abdomen still solfty distended and tender to palpation. After spoken with Md resident King patient agreed to have a CT scan.

## 2020-10-04 NOTE — ED PROVIDER NOTE - CARDIOVASCULAR NEGATIVE STATEMENT, MLM
no chest pain and no edema. no chest pain and no edema. **ATTENDING ADDENDUM (Dr. Ruben Dent): POSITIVE history of Hypertension, hyperlipidemia, and Coronary Artery Disease.

## 2020-10-04 NOTE — ED PROVIDER NOTE - PLAN OF CARE
**ATTENDING ADDENDUM (Dr. Ruben Dent): Goals of care include resolution of emergent/urgent symptoms and concerns, and restoration to baseline level of homeostasis.

## 2020-10-04 NOTE — ED PROVIDER NOTE - PROGRESS NOTE DETAILS
[facility] PGY-3:  Signout from [facility] PGY2:  89yo M s/p two bowel obstructions here w/ distended abdomen, states did not need pain meds, passed flatus here. [phone] (tee, daughter). Awaiting CT if negative can d/c  Admitted to [facility] PGY-3:  Radiology states attg currently reading CT scan, awaiting radiology report Eber PGY-3:  Pt feeling better, tolerating large amount of food (eggs, multiple packs of crackers, liquids), surgery states to d/c w/ F/U. D/W pt return precautions if sx reoccur, states will come back if they, will d/c

## 2020-10-04 NOTE — ED PROVIDER NOTE - OBJECTIVE STATEMENT
89y/o M PMH CAD s/p CABG, recurrent SBO s/p SBR (10+ years ago at [facility]), b/l inguinal hernia repair, recent admission for SBO managed non-operatively with spontaneous resolution presents with abdominal pain, nausea and diarrhea. 89y/o M PMH CAD s/p CABG, recurrent SBO s/p SBR (10+ years ago at [facility]), b/l inguinal hernia repair, recent admission for SBO managed non-operatively with spontaneous resolution discharged 2 weeks ago, presents with abdominal pain x1 day and constipation. Denies n/v, f/c, cough, dysuria, hematuria. Last PO >24 hours ago. At time of history taking, pt has had 2 loose BMs and passed flatus, states he feels better but still c/o abdominal distension. 89y/o M PMH CAD s/p CABG, recurrent SBO s/p SBR (10+ years ago at [facility]), b/l inguinal hernia repair, recent admission for SBO managed non-operatively with spontaneous resolution discharged 2 weeks ago, presents with abdominal pain x1 day and constipation. Denies n/v, f/c, cough, dysuria, hematuria. Last PO >24 hours ago. At time of history taking, pt has had 2 loose BMs and passed flatus, states he feels better but still c/o abdominal distension.  **ATTENDING ADDENDUM (Dr. Ruben Dnet): I attest that I have directly and personally interviewed and examined this patient and elicited a comparable history of present illness and review of systems as documented, along with my EM resident. I attest that I have made significant contributions to the documentation where necessary and as noted in the EMR.

## 2020-10-04 NOTE — ED PROVIDER NOTE - ABDOMINAL EXAM
soft/**ATTENDING ADDENDUM (Dr. Ruben Dent): NO guarding, rebound, or rigidity. NO pulsatile or non-pulsatile masses. NO hernias. NO obvious hepatosplenomegaly. ./nontender.../DISTENDED

## 2020-10-04 NOTE — ED PROVIDER NOTE - MUSCULOSKELETAL, MLM
Spine appears normal, range of motion is not limited, no muscle or joint tenderness **ATTENDING ADDENDUM (Dr. Ruben Dent): Symmetrically equal strength, 5/5, in the bilateral upper and lower extremities. NO cords or calf tenderness.

## 2020-10-04 NOTE — ED PROVIDER NOTE - EYES, MLM
Clear bilaterally, pupils equal, round and reactive to light. **ATTENDING ADDENDUM (Dr. Ruben Dent): Extraocular muscle movements intact. Clear corneas bilaterally, pupils equal and round. NO scleral icterus bilaterally.

## 2020-10-04 NOTE — ED ADULT TRIAGE NOTE - CHIEF COMPLAINT QUOTE
lower abdominal pain since yesterday morning. Nausea and diarrhea.  Recently hospitalized for a SBO.

## 2020-10-04 NOTE — ED PROVIDER NOTE - GASTROINTESTINAL [+], MLM
ABDOMINAL PAIN/NAUSEA **ATTENDING ADDENDUM (Dr. Ruben Dent): POSITIVE history of small bowel obstruction/NAUSEA/ABDOMINAL PAIN

## 2020-10-06 ENCOUNTER — APPOINTMENT (OUTPATIENT)
Dept: GASTROENTEROLOGY | Facility: CLINIC | Age: 85
End: 2020-10-06
Payer: MEDICARE

## 2020-10-06 PROCEDURE — 99442: CPT | Mod: 95

## 2020-10-07 ENCOUNTER — APPOINTMENT (OUTPATIENT)
Dept: GASTROENTEROLOGY | Facility: CLINIC | Age: 85
End: 2020-10-07

## 2020-10-10 ENCOUNTER — INPATIENT (INPATIENT)
Facility: HOSPITAL | Age: 85
LOS: 3 days | Discharge: HOME | DRG: 389 | End: 2020-10-14
Attending: SURGERY | Admitting: SURGERY
Payer: MEDICARE

## 2020-10-10 VITALS
DIASTOLIC BLOOD PRESSURE: 70 MMHG | TEMPERATURE: 98 F | HEART RATE: 75 BPM | RESPIRATION RATE: 18 BRPM | WEIGHT: 117.95 LBS | HEIGHT: 66 IN | OXYGEN SATURATION: 95 % | SYSTOLIC BLOOD PRESSURE: 123 MMHG

## 2020-10-10 DIAGNOSIS — Z95.1 PRESENCE OF AORTOCORONARY BYPASS GRAFT: Chronic | ICD-10-CM

## 2020-10-10 DIAGNOSIS — K56.609 UNSPECIFIED INTESTINAL OBSTRUCTION, UNSPECIFIED AS TO PARTIAL VERSUS COMPLETE OBSTRUCTION: ICD-10-CM

## 2020-10-10 LAB
ALBUMIN SERPL ELPH-MCNC: 3.7 G/DL — SIGNIFICANT CHANGE UP (ref 3.3–5)
ALP SERPL-CCNC: 84 U/L — SIGNIFICANT CHANGE UP (ref 40–120)
ALT FLD-CCNC: 10 U/L — SIGNIFICANT CHANGE UP (ref 10–45)
ANION GAP SERPL CALC-SCNC: 11 MMOL/L — SIGNIFICANT CHANGE UP (ref 5–17)
APTT BLD: 32.7 SEC — SIGNIFICANT CHANGE UP (ref 27.5–35.5)
AST SERPL-CCNC: 13 U/L — SIGNIFICANT CHANGE UP (ref 10–40)
BASOPHILS # BLD AUTO: 0.05 K/UL — SIGNIFICANT CHANGE UP (ref 0–0.2)
BASOPHILS NFR BLD AUTO: 0.6 % — SIGNIFICANT CHANGE UP (ref 0–2)
BILIRUB SERPL-MCNC: 0.6 MG/DL — SIGNIFICANT CHANGE UP (ref 0.2–1.2)
BUN SERPL-MCNC: 14 MG/DL — SIGNIFICANT CHANGE UP (ref 7–23)
CALCIUM SERPL-MCNC: 9.4 MG/DL — SIGNIFICANT CHANGE UP (ref 8.4–10.5)
CHLORIDE SERPL-SCNC: 109 MMOL/L — HIGH (ref 96–108)
CO2 SERPL-SCNC: 22 MMOL/L — SIGNIFICANT CHANGE UP (ref 22–31)
CREAT SERPL-MCNC: 1.13 MG/DL — SIGNIFICANT CHANGE UP (ref 0.5–1.3)
EOSINOPHIL # BLD AUTO: 0.15 K/UL — SIGNIFICANT CHANGE UP (ref 0–0.5)
EOSINOPHIL NFR BLD AUTO: 1.8 % — SIGNIFICANT CHANGE UP (ref 0–6)
GAS PNL BLDV: SIGNIFICANT CHANGE UP
GLUCOSE SERPL-MCNC: 110 MG/DL — HIGH (ref 70–99)
HCT VFR BLD CALC: 39 % — SIGNIFICANT CHANGE UP (ref 39–50)
HGB BLD-MCNC: 12.6 G/DL — LOW (ref 13–17)
IMM GRANULOCYTES NFR BLD AUTO: 0.4 % — SIGNIFICANT CHANGE UP (ref 0–1.5)
INR BLD: 1.07 RATIO — SIGNIFICANT CHANGE UP (ref 0.88–1.16)
LIDOCAIN IGE QN: 15 U/L — SIGNIFICANT CHANGE UP (ref 7–60)
LYMPHOCYTES # BLD AUTO: 1.51 K/UL — SIGNIFICANT CHANGE UP (ref 1–3.3)
LYMPHOCYTES # BLD AUTO: 17.7 % — SIGNIFICANT CHANGE UP (ref 13–44)
MCHC RBC-ENTMCNC: 31.5 PG — SIGNIFICANT CHANGE UP (ref 27–34)
MCHC RBC-ENTMCNC: 32.3 GM/DL — SIGNIFICANT CHANGE UP (ref 32–36)
MCV RBC AUTO: 97.5 FL — SIGNIFICANT CHANGE UP (ref 80–100)
MONOCYTES # BLD AUTO: 0.69 K/UL — SIGNIFICANT CHANGE UP (ref 0–0.9)
MONOCYTES NFR BLD AUTO: 8.1 % — SIGNIFICANT CHANGE UP (ref 2–14)
NEUTROPHILS # BLD AUTO: 6.1 K/UL — SIGNIFICANT CHANGE UP (ref 1.8–7.4)
NEUTROPHILS NFR BLD AUTO: 71.4 % — SIGNIFICANT CHANGE UP (ref 43–77)
NRBC # BLD: 0 /100 WBCS — SIGNIFICANT CHANGE UP (ref 0–0)
PLATELET # BLD AUTO: 276 K/UL — SIGNIFICANT CHANGE UP (ref 150–400)
POTASSIUM SERPL-MCNC: 3.8 MMOL/L — SIGNIFICANT CHANGE UP (ref 3.5–5.3)
POTASSIUM SERPL-SCNC: 3.8 MMOL/L — SIGNIFICANT CHANGE UP (ref 3.5–5.3)
PROT SERPL-MCNC: 6.2 G/DL — SIGNIFICANT CHANGE UP (ref 6–8.3)
PROTHROM AB SERPL-ACNC: 12.8 SEC — SIGNIFICANT CHANGE UP (ref 10.6–13.6)
RBC # BLD: 4 M/UL — LOW (ref 4.2–5.8)
RBC # FLD: 14.2 % — SIGNIFICANT CHANGE UP (ref 10.3–14.5)
SARS-COV-2 RNA SPEC QL NAA+PROBE: SIGNIFICANT CHANGE UP
SODIUM SERPL-SCNC: 142 MMOL/L — SIGNIFICANT CHANGE UP (ref 135–145)
TROPONIN T, HIGH SENSITIVITY RESULT: 27 NG/L — SIGNIFICANT CHANGE UP (ref 0–51)
WBC # BLD: 8.53 K/UL — SIGNIFICANT CHANGE UP (ref 3.8–10.5)
WBC # FLD AUTO: 8.53 K/UL — SIGNIFICANT CHANGE UP (ref 3.8–10.5)

## 2020-10-10 PROCEDURE — 99222 1ST HOSP IP/OBS MODERATE 55: CPT

## 2020-10-10 PROCEDURE — 71045 X-RAY EXAM CHEST 1 VIEW: CPT | Mod: 26

## 2020-10-10 PROCEDURE — 74177 CT ABD & PELVIS W/CONTRAST: CPT | Mod: 26

## 2020-10-10 PROCEDURE — 99284 EMERGENCY DEPT VISIT MOD MDM: CPT | Mod: CS

## 2020-10-10 RX ORDER — PANTOPRAZOLE SODIUM 20 MG/1
40 TABLET, DELAYED RELEASE ORAL
Refills: 0 | Status: DISCONTINUED | OUTPATIENT
Start: 2020-10-10 | End: 2020-10-14

## 2020-10-10 RX ORDER — SODIUM CHLORIDE 9 MG/ML
1000 INJECTION, SOLUTION INTRAVENOUS
Refills: 0 | Status: DISCONTINUED | OUTPATIENT
Start: 2020-10-10 | End: 2020-10-12

## 2020-10-10 RX ORDER — ATORVASTATIN CALCIUM 80 MG/1
10 TABLET, FILM COATED ORAL AT BEDTIME
Refills: 0 | Status: DISCONTINUED | OUTPATIENT
Start: 2020-10-10 | End: 2020-10-14

## 2020-10-10 RX ORDER — MORPHINE SULFATE 50 MG/1
4 CAPSULE, EXTENDED RELEASE ORAL ONCE
Refills: 0 | Status: DISCONTINUED | OUTPATIENT
Start: 2020-10-10 | End: 2020-10-10

## 2020-10-10 RX ORDER — ASPIRIN/CALCIUM CARB/MAGNESIUM 324 MG
81 TABLET ORAL DAILY
Refills: 0 | Status: DISCONTINUED | OUTPATIENT
Start: 2020-10-10 | End: 2020-10-14

## 2020-10-10 RX ORDER — SODIUM CHLORIDE 9 MG/ML
1000 INJECTION, SOLUTION INTRAVENOUS
Refills: 0 | Status: DISCONTINUED | OUTPATIENT
Start: 2020-10-10 | End: 2020-10-10

## 2020-10-10 RX ORDER — GABAPENTIN 400 MG/1
300 CAPSULE ORAL DAILY
Refills: 0 | Status: DISCONTINUED | OUTPATIENT
Start: 2020-10-10 | End: 2020-10-14

## 2020-10-10 RX ORDER — RANOLAZINE 500 MG/1
500 TABLET, FILM COATED, EXTENDED RELEASE ORAL
Refills: 0 | Status: DISCONTINUED | OUTPATIENT
Start: 2020-10-10 | End: 2020-10-14

## 2020-10-10 RX ORDER — ENOXAPARIN SODIUM 100 MG/ML
40 INJECTION SUBCUTANEOUS DAILY
Refills: 0 | Status: DISCONTINUED | OUTPATIENT
Start: 2020-10-10 | End: 2020-10-14

## 2020-10-10 RX ORDER — TAMSULOSIN HYDROCHLORIDE 0.4 MG/1
0.4 CAPSULE ORAL AT BEDTIME
Refills: 0 | Status: DISCONTINUED | OUTPATIENT
Start: 2020-10-10 | End: 2020-10-14

## 2020-10-10 RX ORDER — FINASTERIDE 5 MG/1
5 TABLET, FILM COATED ORAL DAILY
Refills: 0 | Status: DISCONTINUED | OUTPATIENT
Start: 2020-10-10 | End: 2020-10-14

## 2020-10-10 RX ADMIN — RANOLAZINE 500 MILLIGRAM(S): 500 TABLET, FILM COATED, EXTENDED RELEASE ORAL at 20:25

## 2020-10-10 RX ADMIN — Medication 81 MILLIGRAM(S): at 17:59

## 2020-10-10 RX ADMIN — FINASTERIDE 5 MILLIGRAM(S): 5 TABLET, FILM COATED ORAL at 17:59

## 2020-10-10 RX ADMIN — TAMSULOSIN HYDROCHLORIDE 0.4 MILLIGRAM(S): 0.4 CAPSULE ORAL at 22:07

## 2020-10-10 RX ADMIN — PANTOPRAZOLE SODIUM 40 MILLIGRAM(S): 20 TABLET, DELAYED RELEASE ORAL at 20:25

## 2020-10-10 RX ADMIN — ENOXAPARIN SODIUM 40 MILLIGRAM(S): 100 INJECTION SUBCUTANEOUS at 17:59

## 2020-10-10 RX ADMIN — SODIUM CHLORIDE 75 MILLILITER(S): 9 INJECTION, SOLUTION INTRAVENOUS at 22:08

## 2020-10-10 RX ADMIN — ATORVASTATIN CALCIUM 10 MILLIGRAM(S): 80 TABLET, FILM COATED ORAL at 22:07

## 2020-10-10 RX ADMIN — SODIUM CHLORIDE 100 MILLILITER(S): 9 INJECTION, SOLUTION INTRAVENOUS at 11:30

## 2020-10-10 NOTE — ED ADULT NURSE NOTE - OBJECTIVE STATEMENT
89M to ED c/o abd pain for 1 day. Daughter at bedside states patient has decreased oral intake, and has had history of bowel obstructions. Patient states that the pain is in the center of his lower abdomen. Patient states that he is having one loose stool per day. Patient denies nausea/vomiting, fever, SOB, chest pain.    Patient has 20 gauge IV to L forearm. Patient is given call bell, and verbalizes understanding of its use. Patient is given a blanket for warmth. Patient is alert and oriented x4, calm/cooperative, NAD, VSS. Patient has diaper cleaned and changed at this time.

## 2020-10-10 NOTE — ED ADULT NURSE NOTE - NSIMPLEMENTINTERV_GEN_ALL_ED
Implemented All Fall with Harm Risk Interventions:  [place] to call system. Call bell, personal items and telephone within reach. Instruct patient to call for assistance. Room bathroom lighting operational. Non-slip footwear when patient is off stretcher. Physically safe environment: no spills, clutter or unnecessary equipment. Stretcher in lowest position, wheels locked, appropriate side rails in place. Provide visual cue, wrist band, yellow gown, etc. Monitor gait and stability. Monitor for mental status changes and reorient to person, place, and time. Review medications for side effects contributing to fall risk. Reinforce activity limits and safety measures with patient and family. Provide visual clues: red socks.

## 2020-10-10 NOTE — ED PROVIDER NOTE - OBJECTIVE STATEMENT
89y/o M PMH CAD s/p CABG, recurrent SBO s/p SBR (10+ years ago at [facility]), b/l inguinal hernia repair, recent admission for SBO managed non-operatively with spontaneous resolution discharged 4 weeks ago, presents with abdominal pain x1 day, abdominal distention and unable to pass flatus.  Denies n/v, f/c, cough, dysuria, hematuria, chest pain or lower leg swelling. NO recent travel or sick contacts. 89y/o M PMH CAD s/p CABG, recurrent SBO s/p SBR (10+ years ago at [facility]), b/l inguinal hernia repair, recent admission for SBO managed non-operatively with spontaneous resolution discharged 4 weeks ago, presents with abdominal pain x1 day, pain 9/10, abdominal distention and unable to pass flatus.  States this has occurred for years but he has always declined surgical intervention but is agreeable to possible surgical intervention given his symptoms.  Denies n/v, f/c, cough, dysuria, hematuria, chest pain or lower leg swelling. NO recent travel or sick contacts. 89y/o M PMH CAD s/p CABG, recurrent SBO s/p SBR (10+ years ago at [facility]), b/l inguinal hernia repair, recent admission for SBO managed non-operatively with spontaneous resolution discharged 4 weeks ago, presents with abdominal pain x1 day, pain 9/10, abdominal distention and unable to pass flatus.  States this has occurred for years but he has always declined surgical intervention but is agreeable to possible surgical intervention given his symptoms.  Denies n/v, f/c, cough, dysuria, hematuria, chest pain or lower leg swelling. NO recent travel or sick contacts.    Followed by Dr. Junior

## 2020-10-10 NOTE — PROVIDER CONTACT NOTE (MEDICATION) - ACTION/TREATMENT ORDERED:
Patient states she was diagnosed with a mini stroke on Wednesday, went to be last night around 11-12, and woke up around 9 today with symptoms of blurred vision, confusion, and dizziness.      Lay Kilgore RN  02/11/17 [id number]     Pt stated that does not need med

## 2020-10-10 NOTE — H&P ADULT - NSHPLABSRESULTS_GEN_ALL_CORE
LABS:                        12.6   8.53  )-----------( 276      ( 10 Oct 2020 11:41 )             39.0     10 Oct 2020 11:41    142    |  109    |  14     ----------------------------<  110    3.8     |  22     |  1.13     Ca    9.4        10 Oct 2020 11:41    TPro  6.2    /  Alb  3.7    /  TBili  0.6    /  DBili  x      /  AST  13     /  ALT  10     /  AlkPhos  84     10 Oct 2020 11:41    PT/INR - ( 10 Oct 2020 11:41 )   PT: 12.8 sec;   INR: 1.07 ratio         PTT - ( 10 Oct 2020 11:41 )  PTT:32.7 sec    CT   COMPARISON: Abdominal CT dated 10/04/2020    PROCEDURE:  CT of the Abdomen and Pelvis was performed with intravenous contrast.  Intravenous contrast: 90 ml Omnipaque 350. 10 ml discarded.  Oral contrast: positive contrast was administered.  Sagittal and coronal reformats were performed.    FINDINGS:  LOWER CHEST: Bibasilar atelectasis. Trace right pleural effusion, unchanged.    LIVER: Within normal limits.  BILE DUCTS: Normal caliber.  GALLBLADDER: Within normal limits.  SPLEEN: Within normal limits.  PANCREAS: Diffuse mild fatty infiltration of the pancreas.  ADRENALS: Within normal limits.  KIDNEYS/URETERS: The kidneys are normal size without hydronephrosis. Again noted is 8 cm simple appearing cyst exophytic cyst in the lower pole of the right kidney and 3 cm simple appearing cyst in upper pole of the left kidney.    BLADDER: Within normal limits.  REPRODUCTIVE ORGANS: Enlarged prostate gland.    BOWEL: Again noted is chronic  thickening of the distal ileum proximal and distal to the small bowel anastomosis with associated mucosal hyperemia. There is focal dilatation of the small bowel at the level of the anastomosis measuring approximately 5 cm and contain 1 cm hypoechoic. The thickened bowel segment distal to the anastomosis measures approximately 15 cm in length and the thickened segment of small bowel proximal to anastomosis measures approximately 15 cm in length. There is minimal distention of the proximal small bowel measuring up to 3 cm, likely related to low-grade small bowel obstruction. Distention has improved as compared with the prior CT of 10/04/2020.    PERITONEUM: No ascites.  VESSELS: Within normal limits.  RETROPERITONEUM/LYMPH NODES: No lymphadenopathy.  ABDOMINAL WALL: Small fat-containing ventral hernias.  BONES: Degenerative changes.    IMPRESSION:  Chronic ileitis as described above with proximal low-grade small bowel obstruction. The small bowel distention improved as compared with the prior study.

## 2020-10-10 NOTE — ED PROVIDER NOTE - ATTENDING CONTRIBUTION TO CARE
Attending MD Fuentes:   I personally have seen and examined this patient.  Physician assistant note reviewed and agree on plan of care and except where noted.  See HPI, PE, and MDM for details.

## 2020-10-10 NOTE — H&P ADULT - ASSESSMENT
89 year old man with recurrent SBO    Plan:  -NPO except meds, IVF @75, hold Lasix until patient tolerating PO  -serial abdominal exams  -no standing pain medication or anti-emetics  -continue to monitor for ongoing bowel function    -d/w Dr. Shi  ACS Surgery #[id number]

## 2020-10-10 NOTE — H&P ADULT - NSHPPHYSICALEXAM_GEN_ALL_CORE
General: alert and oriented, NAD  Resp: airway patent, respirations unlabored  CVS: regular rate and rhythm  Abdomen: soft, mildly distended and tender in the LLQ, no rebound, guarding or rigidity  Extremities: no edema  Skin: warm, dry, appropriate color

## 2020-10-10 NOTE — H&P ADULT - ATTENDING COMMENTS
Looks well  abdomen not distended  reporting return of bowel function  admitted to surgical team for SBO

## 2020-10-10 NOTE — ED PROVIDER NOTE - CLINICAL SUMMARY MEDICAL DECISION MAKING FREE TEXT BOX
Attending MD Fuentes: 89M with ho adhesive SBO's in the past presenting with mid abdominal pain consistent with SBOs. Pain though did resolve upon my evaluation, abdominal exam with mild mid ttp, soft, moderate distention. Concern for recurrent SBO, pSBO. Plan for labs, CT, IV hydration

## 2020-10-10 NOTE — ED ADULT TRIAGE NOTE - CHIEF COMPLAINT QUOTE
abdominal pain x1 day  hx bowel obstruction  denies n/v/d and unable to pass gas HPI: 34y   Last Menstrual Period 7/10 who comes to the ED from the [facility] clinic for r/o ectopic workup. Pt was last seen in the ER on  and was discharged to followup with a provider for pregnancy of unknown location. Since then, pt reports having vaginal spotting which did not improve with time. Pt had a appointment with [facility] 8/10 and was told to come to the ED for further workup. Pt denies any fevers, chills, n/v, d/c, cp, sob.       PMHX; no reported past medical history  PSHX; CS x 2  POBHX; prior ectopic pregnancy  Allergies: No Known Allergies    MEDS: none reported by patient    Vital Signs Last 24 Hrs  T(C): 36.5 (11 Aug 2018 00:16), Max: 36.8 (10 Aug 2018 19:47)  T(F): 97.7 (11 Aug 2018 00:16), Max: 98.2 (10 Aug 2018 19:47)  HR: 71 (11 Aug 2018 00:16) (61 - 71)  BP: 134/91 (11 Aug 2018 00:16) (129/85 - 134/91)  BP(mean): --  RR: 19 (11 Aug 2018 00:16) (18 - 19)  SpO2: 96% (11 Aug 2018 00:16) (96% - 100%)     PHYSICAL EXAM:  CHEST/LUNG: Clear to percussion bilaterally; No rales, rhonchi, wheezing, or rubs  HEART: Regular rate and rhythm; No murmurs, rubs, or gallops  ABDOMEN: Soft, Nontender, Nondistended; Bowel sounds present  EXTREMITIES:  2+ Peripheral Pulses, No clubbing, cyanosis, or edema  PELVIC:        EXTERNAL GENITALIA: no rashes or lesions        VAGINA: minimal old blood in vagina vault         CERVIX: closed no active bleeding        UTERUS: small, firm, mobile        ADNEXA: no adnexal mass appreciated    LABS:                        12.1   9.8   )-----------( 365      ( 10 Aug 2018 20:47 )             35.8     08-10    138  |  99  |  12.0  ----------------------------<  87  3.9   |  23.0  |  0.58    Ca    9.7      10 Aug 2018 20:47    TPro  8.3  /  Alb  4.8  /  TBili  0.2<L>  /  DBili  x   /  AST  22  /  ALT  23  /  AlkPhos  68  08-10    Urinalysis Basic - ( 10 Aug 2018 21:10 )    Color: Yellow / Appearance: Clear / S.010 / pH: x  Gluc: x / Ketone: Negative  / Bili: Negative / Urobili: Negative mg/dL   Blood: x / Protein: Negative mg/dL / Nitrite: Negative   Leuk Esterase: Negative / RBC: 0-2 /HPF / WBC 0-2   Sq Epi: x / Non Sq Epi: Few / Bacteria: x    HCG Quantitative, Serum (08.10.18 @ 20:47)    HCG Quantitative, Serum: 5846: HCG-Quantitative test interpretations: This test is intended only for the  detection & monitoring of pregnancy. For oncology studies order the tumor  marker test “HCG-TM” (hCG-Tumor Marker).  For pregnancy evaluation the reference values are as follows:  Negative:  <=4 mIU/mL  Indeterminate:  5 - 25 mIU/mL (suggest repeat testing in 72 hours)  Positive:  > 25 mIU/mL  Reference Values during Pregnancy:  Weeks after LMP* REFERENCE INTERVAL mIU/mL     3      6 - 71     4      10 - 750     5      220 - 7,100     6      160 - 32,000     7      3,700 - 164,000     8      32,000 - 150,000     9      64,000 - 151,000     10      47,000 - 187,000     12      28,000 - 211,000     14      14,000 - 63,000     15      12,000 - 71,000     16 - 18  8,000 - 58,000  * LMP:  Last Menstrual Period  HCG results of false positive and false negative for pregnancy are rare,  but can occur with this, and other, hCG tests. Nancy- and post-menopausal  females may have mildly elevated hCG concentrations usually less than 14  mIU/mL that are constant over time.  NOTE:  New Reference Ranges and Interpretive Comments effective  2018. mIU/mL      RADIOLOGY STUDIES:  < from: US Echo Transvaginal, OB (18 @ 01:14) >  EXAM:  US OB LES THAN 14 WKS 1ST GEST                         EXAM:  US OB TRANSVAGINAL                          PROCEDURE DATE:  2018          INTERPRETATION:  CLINICAL INFORMATION: First trimester vaginal bleeding    LMP: 7/10/2018  Beta-HC (previously 3417 on 2018)  Menstrual age: 4 weeks 3 days    COMPARISON: Pelvic ultrasound 2018.    Endovaginal and transabdominal pelvic sonogram. Color and Spectral   Doppler was performed.    FINDINGS:    Uterus: Anteverted uterusmeasures 7.4 cm. Cervical calcifications are   present. No intrauterine gestational sac is seen. Debris in the   endometrial canal is compatible with blood products.    Left ovary: The left ovary sits in the right adnexa. 7.0 x 6.8 x 5.0 cm.   Withinthe left ovary there are 2 cysts, a simple cyst which is increased   in size, measuring 6.5 cm and a hemorrhagic cyst which has decreased in   size, measuring 2.5 x 0.6 cm.    Right ovary: 2.8 x 1.4 x 2.1 cm. medial to the right ovary there is a   complex lesion measuring 2.1 x 1.9 cm compatible with a tubal ectopic   pregnancy.    Fluid: Small volume of a right adnexal hemorrhagic ascites. No fluid in   Morison's pouch.    IMPRESSION:    Right tubal ectopic pregnancy.    Ectopic location of the left ovary which contains 2 cysts; a simple cyst   which is increased in size and a hemorrhagic cyst which is decreased in   size. Although flow is demonstrated to the ovarian parenchyma   intermittent ovarian torsion is not excluded.    Findings discussed with Ruba Donahue by Dr. Lino on 2018 at 3:20   AM  with read back.      < end of copied text >

## 2020-10-10 NOTE — H&P ADULT - HISTORY OF PRESENT ILLNESS
89M w/ hx of CABG, CAD, recurrent SBO s/p SBR for bezoar, subsequent SBR for adhesive SBO (10+ years ago at [facility]), b/l inguinal hernia repair (>10years ago), recent admission for SBO managed non-operatively (9/11-9/13) with spontaneous resolution. Recent presentation to the ED 10/4 with SBO and discharged from ED following spontaneous resolution. He currently complains of decreased PO intake and intermittent abdominal pain for the last week and crampy 9/10 abdominal pain that started last night. He does not complain of nausea or vomiting. He does not have fevers or chills. No chest pain or SOB. He complains of feeling very bloated and being unable to pass gas.    In the ED, patient was afebrile, hemodynamically stable, labs unremarkable, CT imaging revealing redemonstration of a low grade small bowel obstruction w/ ileitis. Since coming to the ED the patient had a small BM and flatus. He states his abdominal pain has since improved but not gone away completely.

## 2020-10-11 LAB
ANION GAP SERPL CALC-SCNC: 11 MMOL/L — SIGNIFICANT CHANGE UP (ref 5–17)
BUN SERPL-MCNC: 12 MG/DL — SIGNIFICANT CHANGE UP (ref 7–23)
CALCIUM SERPL-MCNC: 8.6 MG/DL — SIGNIFICANT CHANGE UP (ref 8.4–10.5)
CHLORIDE SERPL-SCNC: 109 MMOL/L — HIGH (ref 96–108)
CO2 SERPL-SCNC: 17 MMOL/L — LOW (ref 22–31)
CREAT SERPL-MCNC: 0.94 MG/DL — SIGNIFICANT CHANGE UP (ref 0.5–1.3)
GLUCOSE SERPL-MCNC: 74 MG/DL — SIGNIFICANT CHANGE UP (ref 70–99)
HCT VFR BLD CALC: 32.1 % — LOW (ref 39–50)
HGB BLD-MCNC: 10.2 G/DL — LOW (ref 13–17)
MAGNESIUM SERPL-MCNC: 1.7 MG/DL — SIGNIFICANT CHANGE UP (ref 1.6–2.6)
MCHC RBC-ENTMCNC: 31.6 PG — SIGNIFICANT CHANGE UP (ref 27–34)
MCHC RBC-ENTMCNC: 31.8 GM/DL — LOW (ref 32–36)
MCV RBC AUTO: 99.4 FL — SIGNIFICANT CHANGE UP (ref 80–100)
NRBC # BLD: 0 /100 WBCS — SIGNIFICANT CHANGE UP (ref 0–0)
PHOSPHATE SERPL-MCNC: 2.4 MG/DL — LOW (ref 2.5–4.5)
PLATELET # BLD AUTO: 194 K/UL — SIGNIFICANT CHANGE UP (ref 150–400)
POTASSIUM SERPL-MCNC: 3.7 MMOL/L — SIGNIFICANT CHANGE UP (ref 3.5–5.3)
POTASSIUM SERPL-SCNC: 3.7 MMOL/L — SIGNIFICANT CHANGE UP (ref 3.5–5.3)
RBC # BLD: 3.23 M/UL — LOW (ref 4.2–5.8)
RBC # FLD: 14.1 % — SIGNIFICANT CHANGE UP (ref 10.3–14.5)
SODIUM SERPL-SCNC: 137 MMOL/L — SIGNIFICANT CHANGE UP (ref 135–145)
WBC # BLD: 3.89 K/UL — SIGNIFICANT CHANGE UP (ref 3.8–10.5)
WBC # FLD AUTO: 3.89 K/UL — SIGNIFICANT CHANGE UP (ref 3.8–10.5)

## 2020-10-11 PROCEDURE — 99233 SBSQ HOSP IP/OBS HIGH 50: CPT

## 2020-10-11 RX ORDER — MAGNESIUM SULFATE 500 MG/ML
2 VIAL (ML) INJECTION ONCE
Refills: 0 | Status: COMPLETED | OUTPATIENT
Start: 2020-10-11 | End: 2020-10-11

## 2020-10-11 RX ORDER — POTASSIUM CHLORIDE 20 MEQ
40 PACKET (EA) ORAL ONCE
Refills: 0 | Status: COMPLETED | OUTPATIENT
Start: 2020-10-11 | End: 2020-10-11

## 2020-10-11 RX ORDER — POTASSIUM PHOSPHATE, MONOBASIC POTASSIUM PHOSPHATE, DIBASIC 236; 224 MG/ML; MG/ML
15 INJECTION, SOLUTION INTRAVENOUS ONCE
Refills: 0 | Status: COMPLETED | OUTPATIENT
Start: 2020-10-11 | End: 2020-10-11

## 2020-10-11 RX ADMIN — FINASTERIDE 5 MILLIGRAM(S): 5 TABLET, FILM COATED ORAL at 11:13

## 2020-10-11 RX ADMIN — Medication 1 DROP(S): at 11:13

## 2020-10-11 RX ADMIN — ENOXAPARIN SODIUM 40 MILLIGRAM(S): 100 INJECTION SUBCUTANEOUS at 11:06

## 2020-10-11 RX ADMIN — PANTOPRAZOLE SODIUM 40 MILLIGRAM(S): 20 TABLET, DELAYED RELEASE ORAL at 05:20

## 2020-10-11 RX ADMIN — Medication 40 MILLIEQUIVALENT(S): at 17:19

## 2020-10-11 RX ADMIN — RANOLAZINE 500 MILLIGRAM(S): 500 TABLET, FILM COATED, EXTENDED RELEASE ORAL at 05:20

## 2020-10-11 RX ADMIN — ATORVASTATIN CALCIUM 10 MILLIGRAM(S): 80 TABLET, FILM COATED ORAL at 21:37

## 2020-10-11 RX ADMIN — POTASSIUM PHOSPHATE, MONOBASIC POTASSIUM PHOSPHATE, DIBASIC 62.5 MILLIMOLE(S): 236; 224 INJECTION, SOLUTION INTRAVENOUS at 11:13

## 2020-10-11 RX ADMIN — SODIUM CHLORIDE 75 MILLILITER(S): 9 INJECTION, SOLUTION INTRAVENOUS at 11:05

## 2020-10-11 RX ADMIN — Medication 50 GRAM(S): at 11:14

## 2020-10-11 RX ADMIN — Medication 81 MILLIGRAM(S): at 11:06

## 2020-10-11 RX ADMIN — TAMSULOSIN HYDROCHLORIDE 0.4 MILLIGRAM(S): 0.4 CAPSULE ORAL at 21:37

## 2020-10-11 NOTE — PROGRESS NOTE ADULT - ASSESSMENT
89 year old man with recurrent SBO    Plan:  - CLD, advance as tolerated  - serial abdominal exams  - no standing pain medication or anti-emetics  - monitor bowel function

## 2020-10-11 NOTE — PROGRESS NOTE ADULT - SUBJECTIVE AND OBJECTIVE BOX
SUBJECTIVE:   Pt seen and examined at bedside. No acute events overnight. Pt laying in bed comfortably. No nausea, vomiting, fever, chills. +flatus        Vital Signs Last 24 Hrs  T(C): 36.7 (11 Oct 2020 08:58), Max: 36.9 (10 Oct 2020 11:25)  T(F): 98.1 (11 Oct 2020 08:58), Max: 98.5 (10 Oct 2020 11:25)  HR: 82 (11 Oct 2020 08:58) (60 - 82)  BP: 139/74 (11 Oct 2020 08:58) (112/67 - 162/147)  BP(mean): --  RR: 18 (11 Oct 2020 08:58) (16 - 18)  SpO2: 94% (11 Oct 2020 08:58) (94% - 100%)      PHYSICAL EXAM:  Constitutional: NAD, laying in bed  Neuro: AAOx3  Respiratory: breathing comfortably  Gastrointestinal: Abdomen soft, non distended, nontender        I&O's Summary    10 Oct 2020 07:01  -  11 Oct 2020 07:00  --------------------------------------------------------  IN: 1125 mL / OUT: 900 mL / NET: 225 mL      I&O's Detail    10 Oct 2020 07:01  -  11 Oct 2020 07:00  --------------------------------------------------------  IN:    Lactated Ringers: 1125 mL  Total IN: 1125 mL    OUT:    Oral Fluid: 0 mL    Voided (mL): 900 mL  Total OUT: 900 mL    Total NET: 225 mL          MEDICATIONS  (STANDING):  artificial  tears Solution 1 Drop(s) Both EYES daily  aspirin enteric coated 81 milliGRAM(s) Oral daily  atorvastatin 10 milliGRAM(s) Oral at bedtime  enoxaparin Injectable 40 milliGRAM(s) SubCutaneous daily  finasteride 5 milliGRAM(s) Oral daily  gabapentin 300 milliGRAM(s) Oral daily  lactated ringers. 1000 milliLiter(s) (75 mL/Hr) IV Continuous <Continuous>  pantoprazole    Tablet 40 milliGRAM(s) Oral before breakfast  ranolazine 500 milliGRAM(s) Oral two times a day  tamsulosin 0.4 milliGRAM(s) Oral at bedtime    MEDICATIONS  (PRN):      LABS:                        10.2   3.89  )-----------( 194      ( 11 Oct 2020 07:17 )             32.1     10-11    137  |  109<H>  |  12  ----------------------------<  74  3.7   |  17<L>  |  0.94    Ca    8.6      11 Oct 2020 07:15  Phos  2.4     10-11  Mg     1.7     10-11    TPro  6.2  /  Alb  3.7  /  TBili  0.6  /  DBili  x   /  AST  13  /  ALT  10  /  AlkPhos  84  10-10    PT/INR - ( 10 Oct 2020 11:41 )   PT: 12.8 sec;   INR: 1.07 ratio         PTT - ( 10 Oct 2020 11:41 )  PTT:32.7 sec      RADIOLOGY & ADDITIONAL STUDIES:

## 2020-10-11 NOTE — PROGRESS NOTE ADULT - ATTENDING COMMENTS
seen and examined 10-11-20 @ 1017    ? - small bowel resection for bezoar  ? - ex-lap / JESSE for SBO @ [facility]  ? - bilateral inguinal hernia repair  8/27 - 8/29/2013 - admitted to [facility] for SBO  7/23 - 7/25/2018 - admitted to [facility] for SBO  11/18 - 11/19/2018 - admitted to [facility] for SBO  8/28 - 9/1/2020 - admitted to [facility] for SBO  9/11 - 9/13/2020 - admitted to [facility] for SBO  10/4/2020 - seen in [facility] ER for SBO    his last colonoscopy was 2-3 years ago by Dr Tim Linton    tolerating clears w/o nausea or vomiting  +flatus / no BM    soft / NT / mildly distended  midline laparotomy scar    SBO resolving  -clear liquid diet until abdominal distention resolves seen and examined 10-11-20 @ 1017    ? - small bowel resection for bezoar  ? - ex-lap / JESSE for SBO @ [facility]  ? 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)  11/18 - 11/19/2018 - admitted to [facility] for SBO  8/28 - 9/1/2020 - admitted to [facility] for SBO  9/11 - 9/13/2020 - admitted to [facility] for SBO  10/4/2020 - seen in [facility] ER for SBO      tolerating clears w/o nausea or vomiting  +flatus / no BM    soft / NT / mildly distended  midline laparotomy scar    SBO resolving  -clear liquid diet until abdominal distention resolves    terminal ileum stricture  chronic ileitis on all CT scans (since 5/20/2005)  -will refer to GI for repeat colonoscopy, but Crohn's seems unlikely given his age and prior biopsy results  -Given almost monthly recurrences of these same obstructive symptoms which appear to be secondary to the inflammation around the anastomotic stricture in the terminal ileum on CT scan, he might benefit from ileocecostomy, but there is some risk to this surgery because of advanced age, chronic lung disease (bronchiectasis from LULU), and reported extensive small bowel adhesions.

## 2020-10-12 LAB
ANION GAP SERPL CALC-SCNC: 6 MMOL/L — SIGNIFICANT CHANGE UP (ref 5–17)
BUN SERPL-MCNC: 9 MG/DL — SIGNIFICANT CHANGE UP (ref 7–23)
CALCIUM SERPL-MCNC: 8 MG/DL — LOW (ref 8.4–10.5)
CHLORIDE SERPL-SCNC: 108 MMOL/L — SIGNIFICANT CHANGE UP (ref 96–108)
CO2 SERPL-SCNC: 22 MMOL/L — SIGNIFICANT CHANGE UP (ref 22–31)
CREAT SERPL-MCNC: 0.93 MG/DL — SIGNIFICANT CHANGE UP (ref 0.5–1.3)
GLUCOSE SERPL-MCNC: 93 MG/DL — SIGNIFICANT CHANGE UP (ref 70–99)
HCT VFR BLD CALC: 30.7 % — LOW (ref 39–50)
HGB BLD-MCNC: 9.8 G/DL — LOW (ref 13–17)
MAGNESIUM SERPL-MCNC: 2 MG/DL — SIGNIFICANT CHANGE UP (ref 1.6–2.6)
MCHC RBC-ENTMCNC: 31.5 PG — SIGNIFICANT CHANGE UP (ref 27–34)
MCHC RBC-ENTMCNC: 31.9 GM/DL — LOW (ref 32–36)
MCV RBC AUTO: 98.7 FL — SIGNIFICANT CHANGE UP (ref 80–100)
NRBC # BLD: 0 /100 WBCS — SIGNIFICANT CHANGE UP (ref 0–0)
PHOSPHATE SERPL-MCNC: 2.3 MG/DL — LOW (ref 2.5–4.5)
PLATELET # BLD AUTO: 193 K/UL — SIGNIFICANT CHANGE UP (ref 150–400)
POTASSIUM SERPL-MCNC: 4.4 MMOL/L — SIGNIFICANT CHANGE UP (ref 3.5–5.3)
POTASSIUM SERPL-SCNC: 4.4 MMOL/L — SIGNIFICANT CHANGE UP (ref 3.5–5.3)
RBC # BLD: 3.11 M/UL — LOW (ref 4.2–5.8)
RBC # FLD: 13.8 % — SIGNIFICANT CHANGE UP (ref 10.3–14.5)
SODIUM SERPL-SCNC: 136 MMOL/L — SIGNIFICANT CHANGE UP (ref 135–145)
WBC # BLD: 3.69 K/UL — LOW (ref 3.8–10.5)
WBC # FLD AUTO: 3.69 K/UL — LOW (ref 3.8–10.5)

## 2020-10-12 PROCEDURE — 99223 1ST HOSP IP/OBS HIGH 75: CPT

## 2020-10-12 PROCEDURE — 99232 SBSQ HOSP IP/OBS MODERATE 35: CPT

## 2020-10-12 PROCEDURE — 99222 1ST HOSP IP/OBS MODERATE 55: CPT | Mod: GC

## 2020-10-12 RX ORDER — SODIUM CHLORIDE 9 MG/ML
1000 INJECTION, SOLUTION INTRAVENOUS
Refills: 0 | Status: DISCONTINUED | OUTPATIENT
Start: 2020-10-12 | End: 2020-10-13

## 2020-10-12 RX ORDER — SOD SULF/SODIUM/NAHCO3/KCL/PEG
1000 SOLUTION, RECONSTITUTED, ORAL ORAL EVERY 4 HOURS
Refills: 0 | Status: COMPLETED | OUTPATIENT
Start: 2020-10-12 | End: 2020-10-12

## 2020-10-12 RX ORDER — ACETAMINOPHEN 500 MG
1000 TABLET ORAL ONCE
Refills: 0 | Status: COMPLETED | OUTPATIENT
Start: 2020-10-12 | End: 2020-10-12

## 2020-10-12 RX ORDER — FUROSEMIDE 40 MG
10 TABLET ORAL
Refills: 0 | Status: DISCONTINUED | OUTPATIENT
Start: 2020-10-12 | End: 2020-10-13

## 2020-10-12 RX ORDER — ONDANSETRON 8 MG/1
4 TABLET, FILM COATED ORAL ONCE
Refills: 0 | Status: COMPLETED | OUTPATIENT
Start: 2020-10-12 | End: 2020-10-13

## 2020-10-12 RX ADMIN — TAMSULOSIN HYDROCHLORIDE 0.4 MILLIGRAM(S): 0.4 CAPSULE ORAL at 21:07

## 2020-10-12 RX ADMIN — Medication 1 DROP(S): at 15:03

## 2020-10-12 RX ADMIN — PANTOPRAZOLE SODIUM 40 MILLIGRAM(S): 20 TABLET, DELAYED RELEASE ORAL at 05:25

## 2020-10-12 RX ADMIN — GABAPENTIN 300 MILLIGRAM(S): 400 CAPSULE ORAL at 15:02

## 2020-10-12 RX ADMIN — Medication 400 MILLIGRAM(S): at 10:41

## 2020-10-12 RX ADMIN — ENOXAPARIN SODIUM 40 MILLIGRAM(S): 100 INJECTION SUBCUTANEOUS at 15:03

## 2020-10-12 RX ADMIN — Medication 1000 MILLIGRAM(S): at 11:10

## 2020-10-12 RX ADMIN — RANOLAZINE 500 MILLIGRAM(S): 500 TABLET, FILM COATED, EXTENDED RELEASE ORAL at 05:25

## 2020-10-12 RX ADMIN — Medication 1000 MILLILITER(S): at 19:00

## 2020-10-12 RX ADMIN — FINASTERIDE 5 MILLIGRAM(S): 5 TABLET, FILM COATED ORAL at 15:02

## 2020-10-12 RX ADMIN — Medication 1000 MILLILITER(S): at 21:08

## 2020-10-12 RX ADMIN — SODIUM CHLORIDE 75 MILLILITER(S): 9 INJECTION, SOLUTION INTRAVENOUS at 01:14

## 2020-10-12 RX ADMIN — ATORVASTATIN CALCIUM 10 MILLIGRAM(S): 80 TABLET, FILM COATED ORAL at 21:07

## 2020-10-12 RX ADMIN — RANOLAZINE 500 MILLIGRAM(S): 500 TABLET, FILM COATED, EXTENDED RELEASE ORAL at 17:32

## 2020-10-12 RX ADMIN — Medication 81 MILLIGRAM(S): at 15:02

## 2020-10-12 RX ADMIN — Medication 62.5 MILLIMOLE(S): at 10:45

## 2020-10-12 RX ADMIN — Medication 10 MILLIGRAM(S): at 21:08

## 2020-10-12 NOTE — CONSULT NOTE ADULT - PROBLEM SELECTOR RECOMMENDATION 4
stable from a cardiac perspective.   No anginal symptoms and no evidence for fluid overload.   aspirin 81mg  Can resume home dose of lasix (10 mg every other day)  Continue home dose of ranexa 500mg bid  Outpatient f/u with Dr. Lisker.

## 2020-10-12 NOTE — PHYSICAL THERAPY INITIAL EVALUATION ADULT - ACTIVE RANGE OF MOTION EXAMINATION, REHAB EVAL
viet. upper extremity Active ROM was WNL (within normal limits)/bilateral lower extremity Active ROM was WNL (within normal limits)

## 2020-10-12 NOTE — CONSULT NOTE ADULT - ASSESSMENT
89M w/ hx of CABG, CAD, recurrent SBO s/p SBR for bezoar, subsequent SBR for adhesive SBO (10+ years ago at [facility]), b/l inguinal hernia repair (>10years ago), recent admission for SBO managed non-operatively (9/11-9/13) with spontaneous resolution. Recent presentation to the ED 10/4 with SBO and discharged from ED following spontaneous resolution. Admitted for recurrent SBO/ileitis

## 2020-10-12 NOTE — PHYSICAL THERAPY INITIAL EVALUATION ADULT - PERTINENT HX OF CURRENT PROBLEM, REHAB EVAL
89yo M PMHx of CABG, CAD, recurrent SBO s/p SBR for bezoar, subsequent SBR for adhesive SBO (10+ years ago at [facility]), B/L inguinal hernia repair (>10years ago), recent admission for SBO managed non-operatively (9/11-9/13) with spontaneous resolution, pw abdominal pain. CXR 10/10: Clear left lung. Small right pleural effusion with associated right basilar atelectasis. CT Abdomen 10/10: Chronic ileitis as described above with proximal low-grade SBO.

## 2020-10-12 NOTE — PROGRESS NOTE ADULT - ATTENDING COMMENTS
still has abdominal discomfort  will not advance past clears at this time  some bowel function  will get G.I. evaluation for terminal ileitis on CT scan

## 2020-10-12 NOTE — CONSULT NOTE ADULT - ATTENDING COMMENTS
Ileitis  Rule out Crohn's disease  Risks benefits alternatives to colonoscopy reviewed  patient agrees

## 2020-10-12 NOTE — PROGRESS NOTE ADULT - SUBJECTIVE AND OBJECTIVE BOX
SUBJECTIVE:   Pt seen and examined at bedside. No acute events overnight. Pt laying in bed comfortably. No nausea, vomiting, chest pain, SOB, fever, chills. +/+. Pt feels distended w/ some pain after diet        Vital Signs Last 24 Hrs  T(C): 36.4 (12 Oct 2020 05:24), Max: 36.6 (11 Oct 2020 13:52)  T(F): 97.5 (12 Oct 2020 05:24), Max: 97.9 (11 Oct 2020 13:52)  HR: 59 (12 Oct 2020 05:24) (56 - 64)  BP: 120/63 (12 Oct 2020 05:24) (113/60 - 120/63)  BP(mean): --  RR: 18 (12 Oct 2020 05:24) (18 - 18)  SpO2: 97% (12 Oct 2020 00:56) (97% - 98%)      PHYSICAL EXAM:  Constitutional: Patient well nourished, well developed  Neuro: AAOx3  Respiratory: breathing comfortably  Gastrointestinal: Abdomen soft, mildly distended, mildly tender        I&O's Summary    11 Oct 2020 07:01  -  12 Oct 2020 07:00  --------------------------------------------------------  IN: 3015 mL / OUT: 1050 mL / NET: 1965 mL      I&O's Detail    11 Oct 2020 07:01  -  12 Oct 2020 07:00  --------------------------------------------------------  IN:    IV PiggyBack: 250 mL    IV PiggyBack: 50 mL    Lactated Ringers: 1800 mL    Oral Fluid: 915 mL  Total IN: 3015 mL    OUT:    Voided (mL): 1050 mL  Total OUT: 1050 mL    Total NET: 1965 mL          MEDICATIONS  (STANDING):  artificial  tears Solution 1 Drop(s) Both EYES daily  aspirin enteric coated 81 milliGRAM(s) Oral daily  atorvastatin 10 milliGRAM(s) Oral at bedtime  enoxaparin Injectable 40 milliGRAM(s) SubCutaneous daily  finasteride 5 milliGRAM(s) Oral daily  gabapentin 300 milliGRAM(s) Oral daily  pantoprazole    Tablet 40 milliGRAM(s) Oral before breakfast  ranolazine 500 milliGRAM(s) Oral two times a day  tamsulosin 0.4 milliGRAM(s) Oral at bedtime    MEDICATIONS  (PRN):      LABS:                        9.8    3.69  )-----------( 193      ( 12 Oct 2020 07:28 )             30.7     10-12    136  |  108  |  9   ----------------------------<  93  4.4   |  22  |  0.93    Ca    8.0<L>      12 Oct 2020 07:28  Phos  2.3     10-12  Mg     2.0     10-12    TPro  6.2  /  Alb  3.7  /  TBili  0.6  /  DBili  x   /  AST  13  /  ALT  10  /  AlkPhos  84  10-10    PT/INR - ( 10 Oct 2020 11:41 )   PT: 12.8 sec;   INR: 1.07 ratio         PTT - ( 10 Oct 2020 11:41 )  PTT:32.7 sec      RADIOLOGY & ADDITIONAL STUDIES:

## 2020-10-12 NOTE — PHYSICAL THERAPY INITIAL EVALUATION ADULT - MANUAL MUSCLE TESTING RESULTS, REHAB EVAL
grossly assessed due to/B UE and LE: 3+/5 t/o extremities except for R finger abduction 3/5 from previous ulnar n. sx

## 2020-10-12 NOTE — CONSULT NOTE ADULT - SUBJECTIVE AND OBJECTIVE BOX
TRAUMA/ACUTE CARE SURGERY - HOSPITAL MEDICINE CO-MANAGEMENT INITIAL VISIT NOTE    CHIEF COMPLAINT: Patient is a 89y old  Male who presents with a chief complaint of SBO (12 Oct 2020 09:47)      HPI: 89M w/ hx of CABG, CAD, recurrent SBO s/p SBR for bezoar, subsequent SBR for adhesive SBO (10+ years ago at [facility]), b/l inguinal hernia repair (>10years ago), recent admission for SBO managed non-operatively (9/11-9/13) with spontaneous resolution. Recent presentation to the ED 10/4 with SBO and discharged from ED following spontaneous resolution. He currently complains of decreased PO intake and intermittent abdominal pain for the last week and crampy 9/10 abdominal pain that started last night. He does not complain of nausea or vomiting. He does not have fevers or chills. No chest pain or SOB. He complains of feeling very bloated and being unable to pass gas.    In the ED, patient was afebrile, hemodynamically stable, labs unremarkable, CT imaging revealing redemonstration of a low grade small bowel obstruction w/ ileitis. Since coming to the ED the patient had a small BM and flatus. He states his abdominal pain has since improved but not gone away completely.  Admitted for recurrent SBO     (10 Oct 2020 15:08)      History limited due to: [ ] Dementia  [ ] Delirium  [ ] Condition    Pain Symptoms if applicable:             	                         none	   mild         moderate         severe  	                            0	    1-3	     4-6	         7-10  Pain: 5.10  Location: abdomen	  Modifying factors:	  Associated symptoms:	    Allergies    Cipro (Rash)  penicillins (Unknown)    Intolerances        HOME MEDICATIONS: [ ] Reviewed    MEDICATIONS  (STANDING):  artificial  tears Solution 1 Drop(s) Both EYES daily  aspirin enteric coated 81 milliGRAM(s) Oral daily  atorvastatin 10 milliGRAM(s) Oral at bedtime  enoxaparin Injectable 40 milliGRAM(s) SubCutaneous daily  finasteride 5 milliGRAM(s) Oral daily  gabapentin 300 milliGRAM(s) Oral daily  pantoprazole    Tablet 40 milliGRAM(s) Oral before breakfast  ranolazine 500 milliGRAM(s) Oral two times a day  tamsulosin 0.4 milliGRAM(s) Oral at bedtime    MEDICATIONS  (PRN):      PAST MEDICAL & SURGICAL HISTORY:  Gastric motility disorder    Small bowel obstruction due to adhesions    Angina of effort    Hyperlipidemia, unspecified hyperlipidemia type    GERD (gastroesophageal reflux disease)    Hx of benign prostatic hypertrophy    Essential hypertension, benign    CAD (coronary artery disease) of bypass graft    CAD S/P percutaneous coronary angioplasty    S/P CABG x 2    S/P small bowel resection  with subsequent lysis of adhesisions    [ ] Reviewed     SOCIAL HISTORY:  Residence: [ ] [facility]  [ ] SNF  [X ] Community  [ ] Substance abuse:   [ ] Tobacco:   [ ] Alcohol use:     FAMILY HISTORY:  FH: heart disease  Mother and Father    [ ] No pertinent family history in first degree relatives     REVIEW OF SYSTEMS:    CONSTITUTIONAL: No fever  EYES: No eye pain  ENMT:  No difficulty hearing,  NECK: No pain or stiffness  RESPIRATORY:  No shortness of breath  CARDIOVASCULAR: No chest pain, palpitations, dizziness, or leg swelling  GASTROINTESTINAL: abdominal pain and discomfort   GENITOURINARY: No dysuria  NEUROLOGICAL: No headaches  SKIN: No itching  ENDOCRINE: No heat or cold intolerance; No hair loss  MUSCULOSKELETAL: No muscle or back pain  PSYCHIATRIC: No depression, anxiety, mood swings, or difficulty sleeping  HEME/LYMPH: No easy bruising, or bleeding gums  ALLERGY AND IMMUNOLOGIC: No hives or eczema    [  ] All other ROS negative  [  ] Unable to obtain due to poor mental status    PHYSICAL EXAM:    Vital Signs Last 24 Hrs  T(C): 36.3 (12 Oct 2020 12:31), Max: 36.6 (11 Oct 2020 22:43)  T(F): 97.3 (12 Oct 2020 12:31), Max: 97.8 (11 Oct 2020 22:43)  HR: 56 (12 Oct 2020 12:31) (56 - 67)  BP: 117/62 (12 Oct 2020 12:31) (113/60 - 123/70)  BP(mean): --  RR: 18 (12 Oct 2020 12:31) (18 - 18)  SpO2: 96% (12 Oct 2020 12:31) (96% - 98%)    GENERAL: NAD, well-groomed,   HEAD:  Atraumatic, Normocephalic  EYES: conjunctiva and sclera clear  NECK: Supple, No JVD  RESPIRATORY: Clear to auscultation bilaterally; No rales, rhonchi, wheezing, or rubs  CARDIOVASCULAR: Regular rate and rhythm; S1S2  GASTROINTESTINAL: Soft, some llq ttp , Nondistended; Bowel sounds present  EXTREMITIES:  2+ Peripheral Pulses,  NERVOUS SYSTEM:  Alert & Oriented X3; Moving all 4 extremities; No gross sensory deficits      LABS:                        9.8    3.69  )-----------( 193      ( 12 Oct 2020 07:28 )             30.7     Hemoglobin: 9.8 g/dL (10-12 @ 07:28)  Hemoglobin: 10.2 g/dL (10-11 @ 07:17)  Hemoglobin: 12.6 g/dL (10-10 @ 11:41)    10-12    136  |  108  |  9   ----------------------------<  93  4.4   |  22  |  0.93    Ca    8.0<L>      12 Oct 2020 07:28  Phos  2.3     10-12  Mg     2.0     10-12          CAPILLARY BLOOD GLUCOSE        Microbiology     RADIOLOGY & ADDITIONAL STUDIES:    EKG:   Personally Reviewed:  [ ] YES     Imaging:   Personally Reviewed:  [ ] YES               [ ] Consultant(s) Notes Reviewed  [x] Care Discussed with Consultants/Other Providers: Trauma Team    Functional Assessment: [ ] Independent  [ ] Assistance  [ ] Total care  [ ] Non-ambulatory    [ ] Fall risks identified:    [ ] High risk medications identified:    [ ] Probable osteoporosis    [ ] Possible osteomalacia    [ ] Increased delirium risk    [X ] Delirium and other risks can be reduced by:          -early ambulation          -minimizing "tethers" - IV, oxygen, catheters, etc          -avoiding hypnotics and sedatives          -maintaining hydration/nutrition          -avoid anticholinergics - diphenhydramine, etc          -pain control          -supportive environment    Advanced Directives: [ ] DNR  [ ] No feeding tube  [ ] MOLST in chart  [ ] MOLST completed today  [ ] Unknown

## 2020-10-12 NOTE — PHYSICAL THERAPY INITIAL EVALUATION ADULT - ADDITIONAL COMMENTS
Pt lives alone in an apt, no DAYA, elevator access. Previously (I) with all ADLs, ambulates without an assistive device and drives (I). Has a walk-in shower with built in shower bench, +grab bars. Owns a cane and RW. Wears glasses for vision.

## 2020-10-12 NOTE — CONSULT NOTE ADULT - ASSESSMENT
Impression:  89 yo M w/ PMHx SBO s/p SBR (~20 years ago, etiology bezoar?) w/ many subsequent presentations for SBO management non-operatively, CAD s/p CABG, presenting with acute on chronic abdominal pain, CT showing chronic terminal ileitis and low-grade bowel obstruction    # ileitis - etiology unclear, ddx includes infectious, inflammatory (Crohn's disease); unlikely ischemic given clinical stability. Given chronicity, and report of IC stricture, will perform inpatient colonoscopy for biopsies    # bowel obstruction - likely 2/2 adhesions from prior surgery, being managed non-operatively    Recommendations:  - liquid diet today, bowel preparation tonight (to be ordered by GI fellow) and NPO at midnight for colonoscopy tomorrow  - check 2am labs, correct all electrolytes  - if worsening abdominal exam with bowel preparation, would stop prep, stat imaging, and consider NGT for worsening bowel obstruction  - rest of care per primary team    Thank you for this interesting consult. GI will continue to follow.     Booker Dodson, PGY4  Gastroenterology Fellow  Available on [employer] Teams  [phone] ([employer] Short Range Pager)  [phone] (Long Range Pager)    After 5pm, please contact the on-call GI fellow. [phone]

## 2020-10-12 NOTE — PROGRESS NOTE ADULT - ASSESSMENT
Pt is a 89 yo M with recurrent SBO. Pt w/ hx of abd surgeries in the past (SBR, ex lap/JESSE, inguinal hernia repair)    Plan:  - CLD, advance as tolerated  - serial abdominal exams  - monitor bowel function

## 2020-10-12 NOTE — CONSULT NOTE ADULT - SUBJECTIVE AND OBJECTIVE BOX
Chief Complaint:  Patient is a 89y old  Male who presents with a chief complaint of SBO (12 Oct 2020 15:06)      HPI: 89 yo M w/ PMHx SBO s/p SBR (~20 years ago, etiology bezoar?) w/ many subsequent presentations for SBO management non-operatively, CAD s/p CABG, presenting with acute on chronic abdominal pain. Patient states for past 3 weeks having worsening post-prandial abdominal pain, lasting 1-2 hours, epigastric, resolves w/ flatus or bowel movement. No identifiable food triggers. No n/v. Pain has progressed, so patient called outpatient GI (Dr. Tim Linton) and was told to come to ED. States ~10 lb weight loss during this time. No sick contacts. Having 3-4 small bowel movements each day with feelings of incomplete evacuation.     Last colonoscopy 3 years ago, was tole unable to reach TI because of IC-valve stricture.     Patient presented to ED 10/4/20 for symptoms, CT w/ ileitis and low grade bowel obstruction, and was discharged home. Patient then represented 10/10 for recurrent symptoms, with repeat CT showing same ileitis and low grade bowel obstruction. Seen by surgery and admitted for further maangement.     Allergies:  Cipro (Rash)  penicillins (Unknown)      Home Medications:    Hospital Medications:  artificial  tears Solution 1 Drop(s) Both EYES daily  aspirin enteric coated 81 milliGRAM(s) Oral daily  atorvastatin 10 milliGRAM(s) Oral at bedtime  enoxaparin Injectable 40 milliGRAM(s) SubCutaneous daily  finasteride 5 milliGRAM(s) Oral daily  furosemide    Tablet 10 milliGRAM(s) Oral <User Schedule>  gabapentin 300 milliGRAM(s) Oral daily  pantoprazole    Tablet 40 milliGRAM(s) Oral before breakfast  ranolazine 500 milliGRAM(s) Oral two times a day  tamsulosin 0.4 milliGRAM(s) Oral at bedtime      PMHX/PSHX:  Gastric motility disorder    Small bowel obstruction due to adhesions    Angina of effort    Hyperlipidemia, unspecified hyperlipidemia type    GERD (gastroesophageal reflux disease)    S/P small bowel resection    Acid reflux    Hx of benign prostatic hypertrophy    Essential hypertension, benign    CAD (coronary artery disease) of bypass graft    CAD S/P percutaneous coronary angioplasty    S/P CABG x 2    S/P small bowel resection    Acid reflux        Family history:  FH: heart disease    No pertinent family history in first degree relatives        Denies family history of colon cancer/polyps, stomach cancer/polyps, pancreatic cancer/masses, liver cancer/disease, ovarian cancer and endometrial cancer.    Social History:   Tob: Denies  EtOH: Denies  Illicit Drugs: Denies    ROS:     General:  No wt loss, fevers, chills, night sweats, fatigue  Eyes:  Good vision, no reported pain  ENT:  No sore throat, pain, runny nose, dysphagia  CV:  No pain, palpitations, hypo/hypertension  Pulm:  No dyspnea, cough, tachypnea, wheezing  GI:  No pain, No nausea, No vomiting, No diarrhea, No constipation, No weight loss, No fever, No pruritis, No rectal bleeding, No tarry stools, No dysphagia,  :  No pain, bleeding, incontinence, nocturia  Muscle:  No pain, weakness  Neuro:  No weakness, tingling, memory problems  Psych:  No fatigue, insomnia, mood problems, depression  Endocrine:  No polyuria, polydipsia, cold/heat intolerance  Heme:  No petechiae, ecchymosis, easy bruisability  Skin:  No rash, tattoos, scars, edema    PHYSICAL EXAM:     GENERAL:  No acute distress  HEENT:  Normocephalic/atraumatic, no scleral icterus  CHEST:  Clear to auscultation bilaterally, no wheezes/rales/ronchi, no accessory muscle use  HEART:  Regular rate and rhythm, no murmurs/rubs/gallops  ABDOMEN:  Soft, non-tender, non-distended, normoactive bowel sounds,  no masses, no hepato-splenomegaly, no signs of chronic liver disease  EXTREMITIES: No cyanosis, clubbing, or edema  SKIN:  No rash/erythema/ecchymoses/petechiae/wounds/abscess/warm/dry  NEURO:  Alert and oriented x 3, no asterixis    Vital Signs:  Vital Signs Last 24 Hrs  T(C): 36.3 (12 Oct 2020 17:38), Max: 36.7 (12 Oct 2020 09:30)  T(F): 97.3 (12 Oct 2020 17:38), Max: 98 (12 Oct 2020 09:30)  HR: 55 (12 Oct 2020 17:38) (55 - 67)  BP: 125/61 (12 Oct 2020 17:38) (113/60 - 125/61)  BP(mean): --  RR: 18 (12 Oct 2020 17:38) (18 - 18)  SpO2: 99% (12 Oct 2020 17:38) (96% - 99%)  Daily     Daily     LABS:                        9.8    3.69  )-----------( 193      ( 12 Oct 2020 07:28 )             30.7     Mean Cell Volume: 98.7 fl (10-12-20 @ 07:28)    10-12    136  |  108  |  9   ----------------------------<  93  4.4   |  22  |  0.93    Ca    8.0<L>      12 Oct 2020 07:28  Phos  2.3     10-12  Mg     2.0     10-12                                    9.8    3.69  )-----------( 193      ( 12 Oct 2020 07:28 )             30.7                         10.2   3.89  )-----------( 194      ( 11 Oct 2020 07:17 )             32.1                         12.6   8.53  )-----------( 276      ( 10 Oct 2020 11:41 )             39.0       Imaging:  CT A/P 10/10/20  LOWER CHEST: Bibasilar atelectasis. Trace right pleural effusion, unchanged.    LIVER: Within normal limits.  BILE DUCTS: Normal caliber.  GALLBLADDER: Within normal limits.  SPLEEN: Within normal limits.  PANCREAS: Diffuse mild fatty infiltration of the pancreas.  ADRENALS: Within normal limits.  KIDNEYS/URETERS: The kidneys are normal size without hydronephrosis. Again noted is 8 cm simple appearing cyst exophytic cyst in the lower pole of the right kidney and 3 cmsimple appearing cyst in upper pole of the left kidney.    BLADDER: Within normal limits.  REPRODUCTIVE ORGANS: Enlarged prostate gland.    BOWEL: Again noted is chronic  thickening of the distal ileum proximal and distal to the small bowel anastomosis with associated mucosal hyperemia. There is focal dilatation of the small bowel at the level of the anastomosis measuring approximately 5 cm and contain 1 cm hypoechoic. The thickened bowel segment distal to the anastomosis measures approximately 15 cm in length and the thickened segment of small bowel proximal to anastomosis measures approximately 15 cm in length. There is minimal distention of the proximal small bowel measuring up to 3 cm, likely related to low-grade small bowel obstruction. Distention has improved as compared with the prior CT of 10/04/2020.    PERITONEUM: No ascites.  VESSELS: Within normal limits.  RETROPERITONEUM/LYMPH NODES: No lymphadenopathy.  ABDOMINAL WALL: Small fat-containing ventral hernias.  BONES: Degenerative changes.    IMPRESSION:  Chronic ileitis as described above with proximal low-grade small bowel obstruction. The small bowel distention improved as compared with the prior study.

## 2020-10-13 ENCOUNTER — RESULT REVIEW (OUTPATIENT)
Age: 85
End: 2020-10-13

## 2020-10-13 ENCOUNTER — APPOINTMENT (OUTPATIENT)
Dept: GASTROENTEROLOGY | Facility: CLINIC | Age: 85
End: 2020-10-13

## 2020-10-13 DIAGNOSIS — K52.9 NONINFECTIVE GASTROENTERITIS AND COLITIS, UNSPECIFIED: ICD-10-CM

## 2020-10-13 LAB
ANION GAP SERPL CALC-SCNC: 9 MMOL/L — SIGNIFICANT CHANGE UP (ref 5–17)
BUN SERPL-MCNC: 8 MG/DL — SIGNIFICANT CHANGE UP (ref 7–23)
CALCIUM SERPL-MCNC: 8.6 MG/DL — SIGNIFICANT CHANGE UP (ref 8.4–10.5)
CHLORIDE SERPL-SCNC: 108 MMOL/L — SIGNIFICANT CHANGE UP (ref 96–108)
CO2 SERPL-SCNC: 23 MMOL/L — SIGNIFICANT CHANGE UP (ref 22–31)
CREAT SERPL-MCNC: 1.15 MG/DL — SIGNIFICANT CHANGE UP (ref 0.5–1.3)
GLUCOSE SERPL-MCNC: 107 MG/DL — HIGH (ref 70–99)
HCT VFR BLD CALC: 34.2 % — LOW (ref 39–50)
HGB BLD-MCNC: 11.1 G/DL — LOW (ref 13–17)
MAGNESIUM SERPL-MCNC: 2.1 MG/DL — SIGNIFICANT CHANGE UP (ref 1.6–2.6)
MCHC RBC-ENTMCNC: 31.9 PG — SIGNIFICANT CHANGE UP (ref 27–34)
MCHC RBC-ENTMCNC: 32.5 GM/DL — SIGNIFICANT CHANGE UP (ref 32–36)
MCV RBC AUTO: 98.3 FL — SIGNIFICANT CHANGE UP (ref 80–100)
NRBC # BLD: 0 /100 WBCS — SIGNIFICANT CHANGE UP (ref 0–0)
PHOSPHATE SERPL-MCNC: 3.5 MG/DL — SIGNIFICANT CHANGE UP (ref 2.5–4.5)
PLATELET # BLD AUTO: 195 K/UL — SIGNIFICANT CHANGE UP (ref 150–400)
POTASSIUM SERPL-MCNC: 4.1 MMOL/L — SIGNIFICANT CHANGE UP (ref 3.5–5.3)
POTASSIUM SERPL-SCNC: 4.1 MMOL/L — SIGNIFICANT CHANGE UP (ref 3.5–5.3)
RBC # BLD: 3.48 M/UL — LOW (ref 4.2–5.8)
RBC # FLD: 13.8 % — SIGNIFICANT CHANGE UP (ref 10.3–14.5)
SODIUM SERPL-SCNC: 140 MMOL/L — SIGNIFICANT CHANGE UP (ref 135–145)
WBC # BLD: 6.29 K/UL — SIGNIFICANT CHANGE UP (ref 3.8–10.5)
WBC # FLD AUTO: 6.29 K/UL — SIGNIFICANT CHANGE UP (ref 3.8–10.5)

## 2020-10-13 PROCEDURE — 45380 COLONOSCOPY AND BIOPSY: CPT | Mod: GC

## 2020-10-13 PROCEDURE — 88305 TISSUE EXAM BY PATHOLOGIST: CPT | Mod: 26

## 2020-10-13 PROCEDURE — 99232 SBSQ HOSP IP/OBS MODERATE 35: CPT

## 2020-10-13 PROCEDURE — 88312 SPECIAL STAINS GROUP 1: CPT | Mod: 26

## 2020-10-13 PROCEDURE — 99233 SBSQ HOSP IP/OBS HIGH 50: CPT

## 2020-10-13 RX ORDER — ACETAMINOPHEN 500 MG
650 TABLET ORAL EVERY 6 HOURS
Refills: 0 | Status: DISCONTINUED | OUTPATIENT
Start: 2020-10-13 | End: 2020-10-14

## 2020-10-13 RX ORDER — BUDESONIDE, MICRONIZED 100 %
9 POWDER (GRAM) MISCELLANEOUS DAILY
Refills: 0 | Status: DISCONTINUED | OUTPATIENT
Start: 2020-10-13 | End: 2020-10-14

## 2020-10-13 RX ORDER — SODIUM CHLORIDE 9 MG/ML
1000 INJECTION INTRAMUSCULAR; INTRAVENOUS; SUBCUTANEOUS
Refills: 0 | Status: DISCONTINUED | OUTPATIENT
Start: 2020-10-13 | End: 2020-10-13

## 2020-10-13 RX ORDER — ONDANSETRON 8 MG/1
4 TABLET, FILM COATED ORAL ONCE
Refills: 0 | Status: COMPLETED | OUTPATIENT
Start: 2020-10-13 | End: 2020-10-13

## 2020-10-13 RX ORDER — SODIUM CHLORIDE 9 MG/ML
1000 INJECTION, SOLUTION INTRAVENOUS
Refills: 0 | Status: DISCONTINUED | OUTPATIENT
Start: 2020-10-13 | End: 2020-10-14

## 2020-10-13 RX ADMIN — ONDANSETRON 4 MILLIGRAM(S): 8 TABLET, FILM COATED ORAL at 00:54

## 2020-10-13 RX ADMIN — FINASTERIDE 5 MILLIGRAM(S): 5 TABLET, FILM COATED ORAL at 16:33

## 2020-10-13 RX ADMIN — Medication 9 MILLIGRAM(S): at 18:19

## 2020-10-13 RX ADMIN — ENOXAPARIN SODIUM 40 MILLIGRAM(S): 100 INJECTION SUBCUTANEOUS at 16:34

## 2020-10-13 RX ADMIN — Medication 81 MILLIGRAM(S): at 16:33

## 2020-10-13 RX ADMIN — PANTOPRAZOLE SODIUM 40 MILLIGRAM(S): 20 TABLET, DELAYED RELEASE ORAL at 05:23

## 2020-10-13 RX ADMIN — TAMSULOSIN HYDROCHLORIDE 0.4 MILLIGRAM(S): 0.4 CAPSULE ORAL at 21:20

## 2020-10-13 RX ADMIN — RANOLAZINE 500 MILLIGRAM(S): 500 TABLET, FILM COATED, EXTENDED RELEASE ORAL at 16:35

## 2020-10-13 RX ADMIN — SODIUM CHLORIDE 50 MILLILITER(S): 9 INJECTION, SOLUTION INTRAVENOUS at 16:25

## 2020-10-13 RX ADMIN — SODIUM CHLORIDE 30 MILLILITER(S): 9 INJECTION INTRAMUSCULAR; INTRAVENOUS; SUBCUTANEOUS at 12:34

## 2020-10-13 RX ADMIN — RANOLAZINE 500 MILLIGRAM(S): 500 TABLET, FILM COATED, EXTENDED RELEASE ORAL at 05:22

## 2020-10-13 RX ADMIN — ONDANSETRON 4 MILLIGRAM(S): 8 TABLET, FILM COATED ORAL at 08:00

## 2020-10-13 RX ADMIN — ATORVASTATIN CALCIUM 10 MILLIGRAM(S): 80 TABLET, FILM COATED ORAL at 21:20

## 2020-10-13 NOTE — PROGRESS NOTE ADULT - ATTENDING COMMENTS
complains of abdominal discomfort  some bowel function but has not normalized  tolerates clears  for colonoscopy today to evaluate terminal ileitis

## 2020-10-13 NOTE — PROGRESS NOTE ADULT - PROBLEM SELECTOR PLAN 1
Recurrent SBO likely in the setting of adhesions with ileitis   c/w clears   advance diet as tolerated per sx

## 2020-10-13 NOTE — PRE PROCEDURE NOTE - PRE PROCEDURE EVALUATION
Attending Physician:                            Procedure: Colonoscopy     Indication for Procedure: ileitis / Abnormal imaging   ________________________________________________________  PAST MEDICAL & SURGICAL HISTORY:  Gastric motility disorder    Small bowel obstruction due to adhesions    Angina of effort    Hyperlipidemia, unspecified hyperlipidemia type    GERD (gastroesophageal reflux disease)    Hx of benign prostatic hypertrophy    Essential hypertension, benign    CAD (coronary artery disease) of bypass graft    CAD S/P percutaneous coronary angioplasty    S/P CABG x 2    S/P small bowel resection  with subsequent lysis of adhesisions      ALLERGIES:  Cipro (Rash)  penicillins (Unknown)    HOME MEDICATIONS:  Aspirin Enteric Coated 81 mg oral delayed release tablet: 1 tab(s) orally once a day  atorvastatin 10 mg oral tablet: 1 tab(s) orally once a day  Avodart 0.5 mg oral capsule: 1 cap(s) orally once a day  Flomax 0.4 mg oral capsule: orally 2 times a day  gabapentin 100 mg oral tablet: orally once a day  Lasix: 10 milligram(s) orally every other day   Ranexa 500 mg oral tablet, extended release: 1 tab(s) orally 2 times a day    AICD/PPM: [ ] yes   [ ] no    PERTINENT LAB DATA:                        11.1   6.29  )-----------( 195      ( 13 Oct 2020 02:02 )             34.2     10-13    140  |  108  |  8   ----------------------------<  107<H>  4.1   |  23  |  1.15    Ca    8.6      13 Oct 2020 02:02  Phos  3.5     10-13  Mg     2.1     10-13                  PHYSICAL EXAMINATION:    T(C): 36.3  HR: 79  BP: 123/73  RR: 18  SpO2: 96%    Constitutional: NAD  HEENT: PERRLA, EOMI,    Neck:  No JVD  Respiratory: CTAB/L  Cardiovascular: S1 and S2  Gastrointestinal: BS+, soft, NT/ND  Extremities: No peripheral edema  Neurological: A/O x 3, no focal deficits  Psychiatric: Normal mood, normal affect  Skin: No rashes    ASA Class: I [ ]  II [ ]  III [ ]  IV [ ]    COMMENTS:    The patient is a suitable candidate for the planned procedure unless box checked [ ]  No, explain:

## 2020-10-13 NOTE — PROGRESS NOTE ADULT - SUBJECTIVE AND OBJECTIVE BOX
ACS PROGRESS NOTE    SUBJECTIVE:   Pt seen and examined at bedside. No acute events overnight. Pt laying in bed comfortably. Reports nausea over night d/t colonscopy contrast however no vomiting, chest pain, SOB, fever, chills. +/+.    Vital Signs Last 24 Hrs  T(C): 36.4 (12 Oct 2020 05:24), Max: 36.6 (11 Oct 2020 13:52)  T(F): 97.5 (12 Oct 2020 05:24), Max: 97.9 (11 Oct 2020 13:52)  HR: 59 (12 Oct 2020 05:24) (56 - 64)  BP: 120/63 (12 Oct 2020 05:24) (113/60 - 120/63)  BP(mean): --  RR: 18 (12 Oct 2020 05:24) (18 - 18)  SpO2: 97% (12 Oct 2020 00:56) (97% - 98%)      PHYSICAL EXAM:  Constitutional: Patient well nourished, well developed  Neuro: AAOx3  Respiratory: breathing comfortably  Gastrointestinal: Abdomen soft, mildly distended, mildly tender      I&O's Summary    11 Oct 2020 07:01  -  12 Oct 2020 07:00  --------------------------------------------------------  IN: 3015 mL / OUT: 1050 mL / NET: 1965 mL      I&O's Detail    11 Oct 2020 07:01  -  12 Oct 2020 07:00  --------------------------------------------------------  IN:    IV PiggyBack: 250 mL    IV PiggyBack: 50 mL    Lactated Ringers: 1800 mL    Oral Fluid: 915 mL  Total IN: 3015 mL    OUT:    Voided (mL): 1050 mL  Total OUT: 1050 mL    Total NET: 1965 mL          MEDICATIONS  (STANDING):  artificial  tears Solution 1 Drop(s) Both EYES daily  aspirin enteric coated 81 milliGRAM(s) Oral daily  atorvastatin 10 milliGRAM(s) Oral at bedtime  enoxaparin Injectable 40 milliGRAM(s) SubCutaneous daily  finasteride 5 milliGRAM(s) Oral daily  gabapentin 300 milliGRAM(s) Oral daily  pantoprazole    Tablet 40 milliGRAM(s) Oral before breakfast  ranolazine 500 milliGRAM(s) Oral two times a day  tamsulosin 0.4 milliGRAM(s) Oral at bedtime    MEDICATIONS  (PRN):      LABS:                        9.8    3.69  )-----------( 193      ( 12 Oct 2020 07:28 )             30.7     10-12    136  |  108  |  9   ----------------------------<  93  4.4   |  22  |  0.93    Ca    8.0<L>      12 Oct 2020 07:28  Phos  2.3     10-12  Mg     2.0     10-12    TPro  6.2  /  Alb  3.7  /  TBili  0.6  /  DBili  x   /  AST  13  /  ALT  10  /  AlkPhos  84  10-10    PT/INR - ( 10 Oct 2020 11:41 )   PT: 12.8 sec;   INR: 1.07 ratio         PTT - ( 10 Oct 2020 11:41 )  PTT:32.7 sec      RADIOLOGY & ADDITIONAL STUDIES:

## 2020-10-13 NOTE — PROVIDER CONTACT NOTE (OTHER) - ASSESSMENT
Pt vomited about 150 clear liquid. reports feeling nausea. Pt was doing bowel prep this shift for colonoscopy this am

## 2020-10-13 NOTE — PROVIDER CONTACT NOTE (OTHER) - ACTION/TREATMENT ORDERED:
Zofran ordered and given. Cont to monitor. Pt kept NPO after midnight with IVF infusing while NPO. Pt stating he felt better abd continues to remain distended. Pt having multiple Clear B.Ms.

## 2020-10-13 NOTE — PROGRESS NOTE ADULT - SUBJECTIVE AND OBJECTIVE BOX
Patient is a 89y old  Male who presents with a chief complaint of SBO (13 Oct 2020 10:16)      SUBJECTIVE / OVERNIGHT EVENTS: feels better, no cp, sob, passing some flatus, abdominal pain is better     MEDICATIONS  (STANDING):  artificial  tears Solution 1 Drop(s) Both EYES daily  aspirin enteric coated 81 milliGRAM(s) Oral daily  atorvastatin 10 milliGRAM(s) Oral at bedtime  enoxaparin Injectable 40 milliGRAM(s) SubCutaneous daily  finasteride 5 milliGRAM(s) Oral daily  gabapentin 300 milliGRAM(s) Oral daily  lactated ringers. 1000 milliLiter(s) (75 mL/Hr) IV Continuous <Continuous>  pantoprazole    Tablet 40 milliGRAM(s) Oral before breakfast  ranolazine 500 milliGRAM(s) Oral two times a day  sodium chloride 0.9%. 1000 milliLiter(s) (30 mL/Hr) IV Continuous <Continuous>  tamsulosin 0.4 milliGRAM(s) Oral at bedtime    MEDICATIONS  (PRN):        CAPILLARY BLOOD GLUCOSE        I&O's Summary    12 Oct 2020 07:01  -  13 Oct 2020 07:00  --------------------------------------------------------  IN: 2065 mL / OUT: 850 mL / NET: 1215 mL    13 Oct 2020 07:01  -  13 Oct 2020 13:51  --------------------------------------------------------  IN: 0 mL / OUT: 200 mL / NET: -200 mL        PHYSICAL EXAM:  GENERAL: NAD, well-developed  HEAD:  Atraumatic, Normocephalic  EYES: conjunctiva and sclera clear  NECK: Supple, No JVD  CHEST/LUNG: Clear to auscultation bilaterally; No wheeze  HEART: Regular rate and rhythm; No murmurs, rubs, or gallops  ABDOMEN: Soft, Nontender, Nondistended; Bowel sounds present  EXTREMITIES:  2+ Peripheral Pulses,  PSYCH: AAOx3      LABS:                        11.1   6.29  )-----------( 195      ( 13 Oct 2020 02:02 )             34.2     10-13    140  |  108  |  8   ----------------------------<  107<H>  4.1   |  23  |  1.15    Ca    8.6      13 Oct 2020 02:02  Phos  3.5     10-13  Mg     2.1     10-13                RADIOLOGY & ADDITIONAL TESTS:    Imaging Personally Reviewed:    Consultant(s) Notes Reviewed:  sx    Care Discussed with Consultants/Other Providers: sx

## 2020-10-13 NOTE — PRE-ANESTHESIA EVALUATION ADULT - NSANTHOSAYNRD_GEN_A_CORE
No. SINCERE screening performed.  STOP BANG Legend: 0-2 = LOW Risk; 3-4 = INTERMEDIATE Risk; 5-8 = HIGH Risk

## 2020-10-13 NOTE — PROGRESS NOTE ADULT - ASSESSMENT
89M w/ hx of CABG, CAD, recurrent SBO s/p SBR for bezoar, subsequent SBR for adhesive SBO (10+ years ago at [facility]), b/l inguinal hernia repair (>10years ago), recent admission for SBO managed non-operatively (9/11-9/13) with spontaneous resolution. Recent presentation to the ED 10/4 with SBO and discharged from ED following spontaneous resolution. Admitted for recurrent SBO/ileitis

## 2020-10-13 NOTE — PROGRESS NOTE ADULT - ASSESSMENT
Pt is a 89 yo M with recurrent SBO. Pt w/ hx of abd surgeries in the past (SBR, ex lap/JESSE, inguinal hernia repair)    Plan:  - *Colonoscopy scheduled for today*  - CLD, advance as tolerated  - serial abdominal exams  - monitor bowel function    [id number]

## 2020-10-14 ENCOUNTER — TRANSCRIPTION ENCOUNTER (OUTPATIENT)
Age: 85
End: 2020-10-14

## 2020-10-14 VITALS
TEMPERATURE: 98 F | HEART RATE: 74 BPM | OXYGEN SATURATION: 94 % | RESPIRATION RATE: 18 BRPM | DIASTOLIC BLOOD PRESSURE: 68 MMHG | SYSTOLIC BLOOD PRESSURE: 108 MMHG

## 2020-10-14 LAB
ANION GAP SERPL CALC-SCNC: 8 MMOL/L — SIGNIFICANT CHANGE UP (ref 5–17)
BUN SERPL-MCNC: 8 MG/DL — SIGNIFICANT CHANGE UP (ref 7–23)
CALCIUM SERPL-MCNC: 8.4 MG/DL — SIGNIFICANT CHANGE UP (ref 8.4–10.5)
CHLORIDE SERPL-SCNC: 105 MMOL/L — SIGNIFICANT CHANGE UP (ref 96–108)
CO2 SERPL-SCNC: 25 MMOL/L — SIGNIFICANT CHANGE UP (ref 22–31)
CREAT SERPL-MCNC: 0.99 MG/DL — SIGNIFICANT CHANGE UP (ref 0.5–1.3)
ERYTHROCYTE [SEDIMENTATION RATE] IN BLOOD: 20 MM/HR — SIGNIFICANT CHANGE UP (ref 0–20)
GLUCOSE SERPL-MCNC: 124 MG/DL — HIGH (ref 70–99)
HCT VFR BLD CALC: 31.5 % — LOW (ref 39–50)
HGB BLD-MCNC: 10.2 G/DL — LOW (ref 13–17)
MAGNESIUM SERPL-MCNC: 1.8 MG/DL — SIGNIFICANT CHANGE UP (ref 1.6–2.6)
MCHC RBC-ENTMCNC: 32 PG — SIGNIFICANT CHANGE UP (ref 27–34)
MCHC RBC-ENTMCNC: 32.4 GM/DL — SIGNIFICANT CHANGE UP (ref 32–36)
MCV RBC AUTO: 98.7 FL — SIGNIFICANT CHANGE UP (ref 80–100)
NRBC # BLD: 0 /100 WBCS — SIGNIFICANT CHANGE UP (ref 0–0)
PHOSPHATE SERPL-MCNC: 3 MG/DL — SIGNIFICANT CHANGE UP (ref 2.5–4.5)
PLATELET # BLD AUTO: 197 K/UL — SIGNIFICANT CHANGE UP (ref 150–400)
POTASSIUM SERPL-MCNC: 4.6 MMOL/L — SIGNIFICANT CHANGE UP (ref 3.5–5.3)
POTASSIUM SERPL-SCNC: 4.6 MMOL/L — SIGNIFICANT CHANGE UP (ref 3.5–5.3)
RBC # BLD: 3.19 M/UL — LOW (ref 4.2–5.8)
RBC # FLD: 13.8 % — SIGNIFICANT CHANGE UP (ref 10.3–14.5)
SODIUM SERPL-SCNC: 138 MMOL/L — SIGNIFICANT CHANGE UP (ref 135–145)
WBC # BLD: 5.72 K/UL — SIGNIFICANT CHANGE UP (ref 3.8–10.5)
WBC # FLD AUTO: 5.72 K/UL — SIGNIFICANT CHANGE UP (ref 3.8–10.5)

## 2020-10-14 PROCEDURE — 99232 SBSQ HOSP IP/OBS MODERATE 35: CPT | Mod: GC

## 2020-10-14 PROCEDURE — 99232 SBSQ HOSP IP/OBS MODERATE 35: CPT

## 2020-10-14 PROCEDURE — 99233 SBSQ HOSP IP/OBS HIGH 50: CPT

## 2020-10-14 RX ORDER — FINASTERIDE 5 MG/1
1 TABLET, FILM COATED ORAL
Qty: 0 | Refills: 0 | DISCHARGE
Start: 2020-10-14

## 2020-10-14 RX ORDER — ACETAMINOPHEN 500 MG
2 TABLET ORAL
Qty: 0 | Refills: 0 | DISCHARGE
Start: 2020-10-14

## 2020-10-14 RX ORDER — BUDESONIDE, MICRONIZED 100 %
1 POWDER (GRAM) MISCELLANEOUS
Qty: 30 | Refills: 0
Start: 2020-10-14 | End: 2020-11-12

## 2020-10-14 RX ADMIN — Medication 9 MILLIGRAM(S): at 05:34

## 2020-10-14 RX ADMIN — Medication 81 MILLIGRAM(S): at 12:36

## 2020-10-14 RX ADMIN — ENOXAPARIN SODIUM 40 MILLIGRAM(S): 100 INJECTION SUBCUTANEOUS at 12:35

## 2020-10-14 RX ADMIN — PANTOPRAZOLE SODIUM 40 MILLIGRAM(S): 20 TABLET, DELAYED RELEASE ORAL at 05:24

## 2020-10-14 RX ADMIN — RANOLAZINE 500 MILLIGRAM(S): 500 TABLET, FILM COATED, EXTENDED RELEASE ORAL at 05:24

## 2020-10-14 RX ADMIN — FINASTERIDE 5 MILLIGRAM(S): 5 TABLET, FILM COATED ORAL at 12:36

## 2020-10-14 NOTE — PROGRESS NOTE ADULT - ASSESSMENT
Pt is a 89 yo M with recurrent SBO. Pt w/ hx of abd surgeries in the past (SBR, ex lap/JESSE, inguinal hernia repair)    Plan:  - s/p colonoscopy and recs appreciated. budesonide started   - CLD, will advance diet today to low fiber  - serial abdominal exams  - monitor bowel function    [id number]

## 2020-10-14 NOTE — DISCHARGE NOTE PROVIDER - HOSPITAL COURSE
89M w/ hx of CABG, CAD, recurrent SBO s/p SBR for bezoar, subsequent SBR for adhesive SBO (10+ years ago at [facility]), b/l inguinal hernia repair (>10years ago), recent admission for SBO managed non-operatively (9/11-9/13) with spontaneous resolution. Recent presentation to the ED 10/4 with SBO and discharged from ED following spontaneous resolution. Presented 10/10 and was complaining of decreased PO intake and intermittent abdominal pain for the last week and crampy 9/10 abdominal pain. He did not complain of nausea or vomiting. He denied fevers or chills. No chest pain or SOB. He complains of feeling very bloated and being unable to pass gas.  In the ED, patient was afebrile, hemodynamically stable, labs unremarkable, CT imaging revealing redemonstration of a low grade small bowel obstruction w/ ileitis. Patient was subsequently admitted to trauma service. GI was consulted on 10/12 with recommendations for colonoscopy which was performed on 10/13 with no complications. Patient was advanced to clear liquid diet on 10/12 and tolerated well. He went for procedure and was advanced after. Patient reported feeling well after colonoscopy and pain fully resolved. Colonoscopy showed one small polyp in the transverse colon, Stricture in the terminal ileum, A single (solitary) ulcer at the ileocecal valve and Outpouching in the terminal ileum likely diverticula vs fistula orifice. Findings suspicious for Crohn's disease- in which budesonide was started.  On day of discharge, the patient was tolerating diet, ambulating well and pain controlled.

## 2020-10-14 NOTE — PROGRESS NOTE ADULT - SUBJECTIVE AND OBJECTIVE BOX
Patient is a 89y old  Male who presents with a chief complaint of SBO (14 Oct 2020 10:14)      SUBJECTIVE / OVERNIGHT EVENTS: feels better, reports it's the best that he has felt in months, denies cp, sob,    MEDICATIONS  (STANDING):  artificial  tears Solution 1 Drop(s) Both EYES daily  aspirin enteric coated 81 milliGRAM(s) Oral daily  atorvastatin 10 milliGRAM(s) Oral at bedtime  buDESOnide    EC Capsule 9 milliGRAM(s) Oral daily  enoxaparin Injectable 40 milliGRAM(s) SubCutaneous daily  finasteride 5 milliGRAM(s) Oral daily  gabapentin 300 milliGRAM(s) Oral daily  pantoprazole    Tablet 40 milliGRAM(s) Oral before breakfast  ranolazine 500 milliGRAM(s) Oral two times a day  tamsulosin 0.4 milliGRAM(s) Oral at bedtime    MEDICATIONS  (PRN):  acetaminophen   Tablet .. 650 milliGRAM(s) Oral every 6 hours PRN Mild Pain (1 - 3)        CAPILLARY BLOOD GLUCOSE        I&O's Summary    13 Oct 2020 07:01  -  14 Oct 2020 07:00  --------------------------------------------------------  IN: 960 mL / OUT: 525 mL / NET: 435 mL    14 Oct 2020 07:01  -  14 Oct 2020 14:19  --------------------------------------------------------  IN: 780 mL / OUT: 250 mL / NET: 530 mL        PHYSICAL EXAM:  GENERAL: NAD, well-developed  HEAD:  Atraumatic, Normocephalic  EYES: conjunctiva and sclera clear  NECK:  No JVD  CHEST/LUNG: Clear to auscultation bilaterally; No wheeze  HEART: Regular rate and rhythm; S1S2  ABDOMEN: Soft, Nontender, Nondistended; Bowel sounds present  EXTREMITIES:  2+ Peripheral Pulses, No clubbing, cyanosis, or edema  PSYCH: AAOx3      LABS:                        10.2   5.72  )-----------( 197      ( 14 Oct 2020 08:43 )             31.5     10-14    138  |  105  |  8   ----------------------------<  124<H>  4.6   |  25  |  0.99    Ca    8.4      14 Oct 2020 08:43  Phos  3.0     10-14  Mg     1.8     10-14                RADIOLOGY & ADDITIONAL TESTS:    Imaging Personally Reviewed:    Consultant(s) Notes Reviewed:  sx    Care Discussed with Consultants/Other Providers: sx

## 2020-10-14 NOTE — PROGRESS NOTE ADULT - ASSESSMENT
89M w/ hx of CABG, CAD, recurrent SBO s/p SBR for bezoar, subsequent SBR for adhesive SBO (10+ years ago at [facility]), b/l inguinal hernia repair (>10years ago), recent admission for SBO managed non-operatively (9/11-9/13) with spontaneous resolution. Recent presentation to the ED 10/4 with SBO and discharged from ED following spontaneous resolution. Admitted for recurrent SBO/ileitis

## 2020-10-14 NOTE — DISCHARGE NOTE PROVIDER - CARE PROVIDER_API CALL
Colby Milan)  Gastroenterology; Internal Medicine  [address] [place] [address] [address]  [place] [address]  Phone: [phone]  Fax: [phone]  Follow Up Time: 1 week    Ramo Shi [address] [address]  Phone: [phone]  Fax: [phone]  Follow Up Time:

## 2020-10-14 NOTE — PROGRESS NOTE ADULT - ATTENDING COMMENTS
colonoscopy results seem to reflect etiology of inflammatory bowel disease  starting oral steroidal / anti-inflammatory  patient reports feeling much better today  will start clear liquid diet and advance as tolerated as noted by MARY.I.  will monitor response to regular diet and if goes well assess for discharge

## 2020-10-14 NOTE — DISCHARGE NOTE PROVIDER - NSDCMRMEDTOKEN_GEN_ALL_CORE_FT
acetaminophen 325 mg oral tablet: 2 tab(s) orally every 6 hours, As needed, Mild Pain (1 - 3)  Aspirin Enteric Coated 81 mg oral delayed release tablet: 1 tab(s) orally once a day  atorvastatin 10 mg oral tablet: 1 tab(s) orally once a day  Avodart 0.5 mg oral capsule: 1 cap(s) orally once a day  budesonide 9 mg oral tablet, extended release: 1 tab(s) orally once a day   finasteride 5 mg oral tablet: 1 tab(s) orally once a day  Flomax 0.4 mg oral capsule: orally 2 times a day  gabapentin 100 mg oral tablet: orally once a day  Lasix: 10 milligram(s) orally every other day   ocular lubricant ophthalmic solution: 1 drop(s) to each affected eye once a day  Protonix 40 mg oral delayed release tablet: 1 tab(s) orally once a day  Ranexa 500 mg oral tablet, extended release: 1 tab(s) orally 2 times a day

## 2020-10-14 NOTE — PROGRESS NOTE ADULT - SUBJECTIVE AND OBJECTIVE BOX
ACS PROGRESS NOTE    SUBJECTIVE:   Pt seen and examined at bedside. No acute events overnight. Pt laying in bed comfortably.  Reports this is the best he has felt in months. Now s/p colonoscopy yesterday. Denies n/v chest pain, SOB, fever, chills. +/+.    Vital Signs Last 24 Hrs  T(C): 36.4 (12 Oct 2020 05:24), Max: 36.6 (11 Oct 2020 13:52)  T(F): 97.5 (12 Oct 2020 05:24), Max: 97.9 (11 Oct 2020 13:52)  HR: 59 (12 Oct 2020 05:24) (56 - 64)  BP: 120/63 (12 Oct 2020 05:24) (113/60 - 120/63)  BP(mean): --  RR: 18 (12 Oct 2020 05:24) (18 - 18)  SpO2: 97% (12 Oct 2020 00:56) (97% - 98%)      PHYSICAL EXAM:  Constitutional: Patient well nourished, well developed  Neuro: AAOx3  Respiratory: breathing comfortably  Gastrointestinal: Abdomen soft, mildly distended, mildly tender      I&O's Summary    11 Oct 2020 07:01  -  12 Oct 2020 07:00  --------------------------------------------------------  IN: 3015 mL / OUT: 1050 mL / NET: 1965 mL      I&O's Detail    11 Oct 2020 07:01  -  12 Oct 2020 07:00  --------------------------------------------------------  IN:    IV PiggyBack: 250 mL    IV PiggyBack: 50 mL    Lactated Ringers: 1800 mL    Oral Fluid: 915 mL  Total IN: 3015 mL    OUT:    Voided (mL): 1050 mL  Total OUT: 1050 mL    Total NET: 1965 mL          MEDICATIONS  (STANDING):  artificial  tears Solution 1 Drop(s) Both EYES daily  aspirin enteric coated 81 milliGRAM(s) Oral daily  atorvastatin 10 milliGRAM(s) Oral at bedtime  enoxaparin Injectable 40 milliGRAM(s) SubCutaneous daily  finasteride 5 milliGRAM(s) Oral daily  gabapentin 300 milliGRAM(s) Oral daily  pantoprazole    Tablet 40 milliGRAM(s) Oral before breakfast  ranolazine 500 milliGRAM(s) Oral two times a day  tamsulosin 0.4 milliGRAM(s) Oral at bedtime    MEDICATIONS  (PRN):      LABS:                        9.8    3.69  )-----------( 193      ( 12 Oct 2020 07:28 )             30.7     10-12    136  |  108  |  9   ----------------------------<  93  4.4   |  22  |  0.93    Ca    8.0<L>      12 Oct 2020 07:28  Phos  2.3     10-12  Mg     2.0     10-12    TPro  6.2  /  Alb  3.7  /  TBili  0.6  /  DBili  x   /  AST  13  /  ALT  10  /  AlkPhos  84  10-10    PT/INR - ( 10 Oct 2020 11:41 )   PT: 12.8 sec;   INR: 1.07 ratio         PTT - ( 10 Oct 2020 11:41 )  PTT:32.7 sec      RADIOLOGY & ADDITIONAL STUDIES:

## 2020-10-14 NOTE — PROGRESS NOTE ADULT - ASSESSMENT
Impression:  89 yo M w/ PMHx SBO s/p SBR (~20 years ago, etiology bezoar?) w/ many subsequent presentations for SBO management non-operatively, CAD s/p CABG, presenting with acute on chronic abdominal pain, CT showing chronic terminal ileitis and low-grade bowel obstruction, now s/p colonoscopy w/ ileal stricture and ulceration    # ileitis - etiology unclear. Patient's colonoscopy of terminal ileal narrowing w/ ulceration is concerning for Crohn's disease, pathology pending. However upon review of CT scan, patient has ~15cm of ileal wall thickening, with proximal luminal dilation. It is unlikely that narrowing seen on colonoscopy is culprit for the extensive wall thickening and dilated loops of small bowel. It is possible that patient has two conditions, underlying Crohn's disease (which may explain the initial SBO and bezoar) with subsequent surgical scarring/adhesions, and current presentation is a combination of both. Will follow up pathology, and will start budesonide for possible Crohn's disease, and will recommend monitoring abd symptoms and CT findings, and can decide if further surgical intervention is necessary.     Recommendations:  - c/w budesonide 9mg daily  - advance diet as tolerated  - monitor abdominal exam  - follow up pathology  - can follow up with Dr. Milan as outpatient    Thank you for this interesting consult. GI will continue to follow.     Booker Dodson, PGY4  Gastroenterology Fellow  Available on Microsoft Teams  [phone] ([facility] Short Range Pager)  [phone] (Long Range Pager)    After 5pm, please contact the on-call GI fellow. [phone]

## 2020-10-14 NOTE — PROGRESS NOTE ADULT - ATTENDING COMMENTS
Improving obstructive complaints  Imaging and colonoscopy findings suspicious for Crohn's  If tolerates Low res diet, agree with DC home on Entocort  Office follow up 2 weeks

## 2020-10-14 NOTE — PROGRESS NOTE ADULT - PROBLEM SELECTOR PLAN 5
stable from a cardiac perspective.   No anginal symptoms and no evidence for fluid overload.   aspirin 81mg  Continue home dose of ranexa 500mg bid  Outpatient f/u with Dr. Lisker.

## 2020-10-14 NOTE — DISCHARGE NOTE PROVIDER - NSDCFUSCHEDAPPT_GEN_ALL_CORE_FT
CHER GEORGE ; 10/29/2020 ; [facility] Derm 1991 [facility] ; 01/12/2021 ; [facility] Cardio [facility]

## 2020-10-14 NOTE — PROGRESS NOTE ADULT - SUBJECTIVE AND OBJECTIVE BOX
Chief Complaint:  Patient is a 89y old  Male who presents with a chief complaint of SBO (13 Oct 2020 13:51)      Interval Events: yesterday underwent colonoscopy, tolerated well. Notable for ileal narrowing just proximal to native IC valve w/ ulceration and outpouching, biopsies taken. Started on budesonide 9mg daily.   This morning stating he feels significantly better, no abdominal pain, ambulating w/o problem.     Allergies:  Cipro (Rash)  penicillins (Unknown)      Hospital Medications:  acetaminophen   Tablet .. 650 milliGRAM(s) Oral every 6 hours PRN  artificial  tears Solution 1 Drop(s) Both EYES daily  aspirin enteric coated 81 milliGRAM(s) Oral daily  atorvastatin 10 milliGRAM(s) Oral at bedtime  buDESOnide    EC Capsule 9 milliGRAM(s) Oral daily  enoxaparin Injectable 40 milliGRAM(s) SubCutaneous daily  finasteride 5 milliGRAM(s) Oral daily  gabapentin 300 milliGRAM(s) Oral daily  lactated ringers. 1000 milliLiter(s) IV Continuous <Continuous>  pantoprazole    Tablet 40 milliGRAM(s) Oral before breakfast  ranolazine 500 milliGRAM(s) Oral two times a day  tamsulosin 0.4 milliGRAM(s) Oral at bedtime        PHYSICAL EXAM:   Vital Signs:  Vital Signs Last 24 Hrs  T(C): 36.6 (14 Oct 2020 06:13), Max: 37.1 (13 Oct 2020 20:13)  T(F): 97.9 (14 Oct 2020 06:13), Max: 98.7 (13 Oct 2020 20:13)  HR: 75 (14 Oct 2020 06:13) (66 - 81)  BP: 146/66 (14 Oct 2020 06:13) (92/35 - 177/86)  BP(mean): --  RR: 18 (14 Oct 2020 06:13) (15 - 18)  SpO2: 97% (14 Oct 2020 06:13) (94% - 98%)  Daily Height in cm: 167.64 (13 Oct 2020 12:55)    Daily     GENERAL:  No acute distress  HEENT:  Normocephalic/atraumtic,  no scleral icterus  CHEST:  Clear to auscultation bilaterally, no wheezes/rales/ronchi, no accessory muscle use  HEART:  Regular rate and rhythm, no murmurs/rubs/gallops  ABDOMEN:  Soft, non-tender, non-distended, normoactive bowel sounds, no masses, no hepato-splenomegaly, no signs of chronic liver disease  EXTREMITIES:  No cyanosis, clubbing, or edema  SKIN:  No rash/erythema/ecchymoses/petechiae/wounds/abscess/warm/dry  NEURO:  Alert and oriented x 3, no asterixis, no tremor    LABS:                        11.1   6.29  )-----------( 195      ( 13 Oct 2020 02:02 )             34.2       10-13    140  |  108  |  8   ----------------------------<  107<H>  4.1   |  23  |  1.15    Ca    8.6      13 Oct 2020 02:02  Phos  3.5     10-13  Mg     2.1     10-13                  Imaging:  Colonoscopy 10/14/20  Findings:       The perianal and digital rectal examinations were normal.       A small polyp was found in the transverse colon. The polyp was hyperplastic. The polyp was        removed with a cold snare. Resection and retrieval were complete.       The exam was otherwise normal throughout the examined colon.       The terminal ileum, 1cm proximal to the IC valve orifice, contained a benign-appearing,        intrinsic moderate stenosis measuring 7 mm (inner diameter) that was non-traversed. Stenosis        was ulcerated. Biopsies were taken with a cold forceps for histology.       The ileocecal valve contained a single (solitary) ulcer. No bleeding was present. No stigmata        of recent bleeding were seen.       An area of outpouching was noted just proximal to IC valve, adjacent to stenosis.                                                                                                        Impression:          - One small polyp in the transverse colon, removed with a cold snare.                        Resected and retrieved.                       - Stricture in the terminal ileum. Biopsied.                       - A single (solitary) ulcer at the ileocecal valve.                       - Outpouching in the terminal ileum likely diverticula vs fistula orifice.             Findings suspicious for Crohn's disease

## 2020-10-14 NOTE — DISCHARGE NOTE PROVIDER - CARE PROVIDERS DIRECT ADDRESSES
[email or web address]

## 2020-10-14 NOTE — DISCHARGE NOTE PROVIDER - NSDCCPCAREPLAN_GEN_ALL_CORE_FT
PRINCIPAL DISCHARGE DIAGNOSIS  Diagnosis: Small bowel obstruction  Assessment and Plan of Treatment: You have been treated for your small bowel obstruction.  Continue a low fiber diet  NOTIFY YOUR MD IF: You have any fever (over 100.4 F) or chills, persistent nausea/vomiting with inability to tolerate food or liquids, persistent diarrhea, or if your pain is not controlled on your discharge pain medications.  FOLLOW-UP:  Please follow up with your primary care physician in one week regarding your hospitalization.   Please continue to follow up with GI.      SECONDARY DISCHARGE DIAGNOSES  Diagnosis: Crohn's disease  Assessment and Plan of Treatment: A new finding noted on your colonoscopy. The biopsy result should be ready by friday.   Please follow up with gastroenterology.    Diagnosis: Ileitis  Assessment and Plan of Treatment: Resolved.

## 2020-10-14 NOTE — DISCHARGE NOTE NURSING/CASE MANAGEMENT/SOCIAL WORK - PATIENT PORTAL LINK FT
You can access the FollowMyHealth Patient Portal offered by [facility] by registering at the following website: [email or web address]. By joining [facility]’s FollowMyHealth portal, you will also be able to view your health information using other applications (apps) compatible with our system.

## 2020-10-14 NOTE — PROGRESS NOTE ADULT - PROBLEM SELECTOR PLAN 2
S/p colonoscopy with narrowing of terminal ileum w/ ulceration  suspicion for crohn's disease   initiated on entocort   GI follow up

## 2020-10-16 LAB — SURGICAL PATHOLOGY STUDY: SIGNIFICANT CHANGE UP

## 2020-10-21 PROCEDURE — 85018 HEMOGLOBIN: CPT

## 2020-10-21 PROCEDURE — 83605 ASSAY OF LACTIC ACID: CPT

## 2020-10-21 PROCEDURE — 88312 SPECIAL STAINS GROUP 1: CPT

## 2020-10-21 PROCEDURE — 84100 ASSAY OF PHOSPHORUS: CPT

## 2020-10-21 PROCEDURE — 88305 TISSUE EXAM BY PATHOLOGIST: CPT

## 2020-10-21 PROCEDURE — 85730 THROMBOPLASTIN TIME PARTIAL: CPT

## 2020-10-21 PROCEDURE — 82330 ASSAY OF CALCIUM: CPT

## 2020-10-21 PROCEDURE — 80048 BASIC METABOLIC PNL TOTAL CA: CPT

## 2020-10-21 PROCEDURE — 83690 ASSAY OF LIPASE: CPT

## 2020-10-21 PROCEDURE — 99285 EMERGENCY DEPT VISIT HI MDM: CPT

## 2020-10-21 PROCEDURE — 85027 COMPLETE CBC AUTOMATED: CPT

## 2020-10-21 PROCEDURE — 80053 COMPREHEN METABOLIC PANEL: CPT

## 2020-10-21 PROCEDURE — 85014 HEMATOCRIT: CPT

## 2020-10-21 PROCEDURE — 82803 BLOOD GASES ANY COMBINATION: CPT

## 2020-10-21 PROCEDURE — 71045 X-RAY EXAM CHEST 1 VIEW: CPT

## 2020-10-21 PROCEDURE — 82947 ASSAY GLUCOSE BLOOD QUANT: CPT

## 2020-10-21 PROCEDURE — 85652 RBC SED RATE AUTOMATED: CPT

## 2020-10-21 PROCEDURE — 74177 CT ABD & PELVIS W/CONTRAST: CPT

## 2020-10-21 PROCEDURE — 82435 ASSAY OF BLOOD CHLORIDE: CPT

## 2020-10-21 PROCEDURE — 85025 COMPLETE CBC W/AUTO DIFF WBC: CPT

## 2020-10-21 PROCEDURE — 97161 PT EVAL LOW COMPLEX 20 MIN: CPT

## 2020-10-21 PROCEDURE — 84295 ASSAY OF SERUM SODIUM: CPT

## 2020-10-21 PROCEDURE — 84484 ASSAY OF TROPONIN QUANT: CPT

## 2020-10-21 PROCEDURE — 84132 ASSAY OF SERUM POTASSIUM: CPT

## 2020-10-21 PROCEDURE — 83735 ASSAY OF MAGNESIUM: CPT

## 2020-10-21 PROCEDURE — 85610 PROTHROMBIN TIME: CPT

## 2020-10-21 PROCEDURE — U0003: CPT

## 2020-10-21 PROCEDURE — 36415 COLL VENOUS BLD VENIPUNCTURE: CPT

## 2020-10-22 ENCOUNTER — NON-APPOINTMENT (OUTPATIENT)
Age: 85
End: 2020-10-22

## 2020-10-29 ENCOUNTER — APPOINTMENT (OUTPATIENT)
Dept: DERMATOLOGY | Facility: CLINIC | Age: 85
End: 2020-10-29
Payer: MEDICARE

## 2020-10-29 DIAGNOSIS — L81.4 OTHER MELANIN HYPERPIGMENTATION: ICD-10-CM

## 2020-10-29 DIAGNOSIS — L82.1 OTHER SEBORRHEIC KERATOSIS: ICD-10-CM

## 2020-10-29 DIAGNOSIS — D22.9 MELANOCYTIC NEVI, UNSPECIFIED: ICD-10-CM

## 2020-10-29 DIAGNOSIS — L57.0 ACTINIC KERATOSIS: ICD-10-CM

## 2020-10-29 PROCEDURE — 99213 OFFICE O/P EST LOW 20 MIN: CPT | Mod: 25

## 2020-10-29 PROCEDURE — 17003 DESTRUCT PREMALG LES 2-14: CPT

## 2020-10-29 PROCEDURE — 17000 DESTRUCT PREMALG LESION: CPT

## 2020-10-30 ENCOUNTER — APPOINTMENT (OUTPATIENT)
Dept: GASTROENTEROLOGY | Facility: CLINIC | Age: 85
End: 2020-10-30
Payer: MEDICARE

## 2020-10-30 VITALS
BODY MASS INDEX: 24.54 KG/M2 | WEIGHT: 125 LBS | DIASTOLIC BLOOD PRESSURE: 78 MMHG | HEART RATE: 60 BPM | SYSTOLIC BLOOD PRESSURE: 140 MMHG | TEMPERATURE: 97.5 F | RESPIRATION RATE: 16 BRPM | HEIGHT: 60 IN | OXYGEN SATURATION: 97 %

## 2020-10-30 PROCEDURE — 99214 OFFICE O/P EST MOD 30 MIN: CPT

## 2020-10-30 NOTE — PHYSICAL EXAM
[General Appearance - Alert] : alert [General Appearance - In No Acute Distress] : in no acute distress [Sclera] : the sclera and conjunctiva were normal [PERRL With Normal Accommodation] : pupils were equal in size, round, and reactive to light [Extraocular Movements] : extraocular movements were intact [Outer Ear] : the ears and nose were normal in appearance [Oropharynx] : the oropharynx was normal [Oriented To Time, Place, And Person] : oriented to person, place, and time [Impaired Insight] : insight and judgment were intact [Affect] : the affect was normal

## 2020-10-30 NOTE — HISTORY OF PRESENT ILLNESS
[de-identified] : dictation inactive\par \par 89 yr male, small bowel crohn's disease\par History of small bowel resection years ago for bezoar\par Recurrent SBO complaitns with increasing frequency\par Admission to [facility] with CT confirming inflammation proximal and distal to small bowel/small bowel anastamosis\par Colonoscopy with stricture noted 1cm proximal to IC valve\par biopsy showed ileitis\par Patient with rapid improvement with entocort \par Now on 1 tab (3mg) daily\par Feeling extremely well\par No obstructive signs or symptoms

## 2020-10-30 NOTE — ASSESSMENT
[FreeTextEntry1] : Crohn's of small bowel\par excellent response to entocort\par \par Plan\par Continue 3mg daily for 4 weeks then taper over 2 to 4 weeks to OFF\par OV 8 weeks\par No immediate plan for immune therapy\par

## 2020-11-02 ENCOUNTER — NON-APPOINTMENT (OUTPATIENT)
Age: 85
End: 2020-11-02

## 2020-11-02 ENCOUNTER — APPOINTMENT (OUTPATIENT)
Dept: CARDIOLOGY | Facility: CLINIC | Age: 85
End: 2020-11-02
Payer: MEDICARE

## 2020-11-02 VITALS
RESPIRATION RATE: 16 BRPM | BODY MASS INDEX: 25.72 KG/M2 | HEART RATE: 79 BPM | WEIGHT: 131 LBS | SYSTOLIC BLOOD PRESSURE: 164 MMHG | OXYGEN SATURATION: 96 % | HEIGHT: 60 IN | TEMPERATURE: 97.9 F | DIASTOLIC BLOOD PRESSURE: 72 MMHG

## 2020-11-02 PROCEDURE — 93000 ELECTROCARDIOGRAM COMPLETE: CPT

## 2020-11-02 PROCEDURE — 99213 OFFICE O/P EST LOW 20 MIN: CPT

## 2020-11-02 NOTE — HISTORY OF PRESENT ILLNESS
[FreeTextEntry1] : Diagnosed with inflamatory colitis.\par Much improved with Budesonide.\par Noted to have elevated BP on home monitor.\par  \par \par

## 2020-11-02 NOTE — DISCUSSION/SUMMARY
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 No angina. No ECG changes. Nuclear stress test was normal.\par Recent colitis improved with medical treatment.\par ECG: SR with APCs\par New HTN, will try norvasc 2.5\par see me in 3-4 weeks.\par Continue ASA and statin.\par

## 2020-11-20 ENCOUNTER — NON-APPOINTMENT (OUTPATIENT)
Age: 85
End: 2020-11-20

## 2020-11-20 ENCOUNTER — APPOINTMENT (OUTPATIENT)
Dept: PULMONOLOGY | Facility: CLINIC | Age: 85
End: 2020-11-20
Payer: MEDICARE

## 2020-11-20 VITALS
BODY MASS INDEX: 25.13 KG/M2 | SYSTOLIC BLOOD PRESSURE: 152 MMHG | RESPIRATION RATE: 18 BRPM | WEIGHT: 128 LBS | HEART RATE: 112 BPM | DIASTOLIC BLOOD PRESSURE: 69 MMHG | TEMPERATURE: 98.3 F | HEIGHT: 60 IN

## 2020-11-20 DIAGNOSIS — A31.0 PULMONARY MYCOBACTERIAL INFECTION: ICD-10-CM

## 2020-11-20 DIAGNOSIS — J47.1 BRONCHIECTASIS WITH (ACUTE) EXACERBATION: ICD-10-CM

## 2020-11-20 PROCEDURE — 93000 ELECTROCARDIOGRAM COMPLETE: CPT

## 2020-11-20 PROCEDURE — 99214 OFFICE O/P EST MOD 30 MIN: CPT | Mod: 25

## 2020-11-20 NOTE — ASSESSMENT
[FreeTextEntry1] : the patient's electrocardiogram showed multifocal atrial tachycardia. However his lungs were clear. I suspect that he had minimal amount of hemoptysis from heavy coughing. Nevertheless I will repeat his stress CT. Cardiology, Dr. Jay Lisker  reviewed the EKG

## 2020-11-20 NOTE — REVIEW OF SYSTEMS
[Fatigue] : fatigue [Cough] : cough [Palpitations] : palpitations [GERD] : gerd [Dysphagia] : dysphagia [Negative] : HEENT

## 2020-11-20 NOTE — HISTORY OF PRESENT ILLNESS
[TextBox_4] : 89 -year-old man with hemoptysis. The patient has been complaining of increased weakness and cough to the point he restarted his Advair this morning. However after a heavy bout of coughing he coughed up small amounts of blood. He denies any chest pain or fevers. He has been feeling increased weakness and fatigue. He denies any recent respiratory infections and he denies any peripheral edema.

## 2020-11-20 NOTE — PHYSICAL EXAM
[No Acute Distress] : no acute distress [Normal Appearance] : normal appearance [No Resp Distress] : no resp distress [Clear to Auscultation Bilaterally] : clear to auscultation bilaterally [No Clubbing] : no clubbing [No Edema] : no edema [No Focal Deficits] : no focal deficits [TextBox_54] : irregular irregular rhythm

## 2020-11-21 ENCOUNTER — INPATIENT (INPATIENT)
Facility: HOSPITAL | Age: 85
LOS: 1 days | Discharge: HOME | DRG: 194 | End: 2020-11-23
Attending: STUDENT IN AN ORGANIZED HEALTH CARE EDUCATION/TRAINING PROGRAM | Admitting: HOSPITALIST
Payer: MEDICARE

## 2020-11-21 ENCOUNTER — APPOINTMENT (OUTPATIENT)
Dept: CT IMAGING | Facility: IMAGING CENTER | Age: 85
End: 2020-11-21
Payer: MEDICARE

## 2020-11-21 ENCOUNTER — OUTPATIENT (OUTPATIENT)
Dept: OUTPATIENT SERVICES | Facility: HOSPITAL | Age: 85
LOS: 1 days | End: 2020-11-21

## 2020-11-21 ENCOUNTER — RESULT REVIEW (OUTPATIENT)
Age: 85
End: 2020-11-21

## 2020-11-21 VITALS
DIASTOLIC BLOOD PRESSURE: 65 MMHG | HEIGHT: 66 IN | TEMPERATURE: 98 F | WEIGHT: 119.93 LBS | SYSTOLIC BLOOD PRESSURE: 111 MMHG | HEART RATE: 120 BPM | OXYGEN SATURATION: 95 % | RESPIRATION RATE: 20 BRPM

## 2020-11-21 DIAGNOSIS — Z95.1 PRESENCE OF AORTOCORONARY BYPASS GRAFT: Chronic | ICD-10-CM

## 2020-11-21 DIAGNOSIS — J18.9 PNEUMONIA, UNSPECIFIED ORGANISM: ICD-10-CM

## 2020-11-21 DIAGNOSIS — A31.0 PULMONARY MYCOBACTERIAL INFECTION: ICD-10-CM

## 2020-11-21 DIAGNOSIS — I25.810 ATHEROSCLEROSIS OF CORONARY ARTERY BYPASS GRAFT(S) WITHOUT ANGINA PECTORIS: ICD-10-CM

## 2020-11-21 DIAGNOSIS — J47.1 BRONCHIECTASIS WITH (ACUTE) EXACERBATION: ICD-10-CM

## 2020-11-21 DIAGNOSIS — E78.5 HYPERLIPIDEMIA, UNSPECIFIED: ICD-10-CM

## 2020-11-21 DIAGNOSIS — Z29.9 ENCOUNTER FOR PROPHYLACTIC MEASURES, UNSPECIFIED: ICD-10-CM

## 2020-11-21 DIAGNOSIS — N40.0 BENIGN PROSTATIC HYPERPLASIA WITHOUT LOWER URINARY TRACT SYMPTOMS: ICD-10-CM

## 2020-11-21 DIAGNOSIS — K50.90 CROHN'S DISEASE, UNSPECIFIED, WITHOUT COMPLICATIONS: ICD-10-CM

## 2020-11-21 LAB
ALBUMIN SERPL ELPH-MCNC: 3.1 G/DL — LOW (ref 3.3–5)
ALP SERPL-CCNC: 146 U/L — HIGH (ref 40–120)
ALT FLD-CCNC: 111 U/L — HIGH (ref 10–45)
ANION GAP SERPL CALC-SCNC: 13 MMOL/L — SIGNIFICANT CHANGE UP (ref 5–17)
ANISOCYTOSIS BLD QL: SLIGHT — SIGNIFICANT CHANGE UP
APTT BLD: 27.9 SEC — SIGNIFICANT CHANGE UP (ref 27.5–35.5)
AST SERPL-CCNC: 59 U/L — HIGH (ref 10–40)
BASE EXCESS BLDV CALC-SCNC: -0.3 MMOL/L — SIGNIFICANT CHANGE UP (ref -2–2)
BASE EXCESS BLDV CALC-SCNC: -2.2 MMOL/L — LOW (ref -2–2)
BASOPHILS # BLD AUTO: 0 K/UL — SIGNIFICANT CHANGE UP (ref 0–0.2)
BASOPHILS NFR BLD AUTO: 0 % — SIGNIFICANT CHANGE UP (ref 0–2)
BILIRUB SERPL-MCNC: 0.5 MG/DL — SIGNIFICANT CHANGE UP (ref 0.2–1.2)
BUN SERPL-MCNC: 27 MG/DL — HIGH (ref 7–23)
CA-I SERPL-SCNC: 1.12 MMOL/L — SIGNIFICANT CHANGE UP (ref 1.12–1.3)
CA-I SERPL-SCNC: 1.17 MMOL/L — SIGNIFICANT CHANGE UP (ref 1.12–1.3)
CALCIUM SERPL-MCNC: 8.7 MG/DL — SIGNIFICANT CHANGE UP (ref 8.4–10.5)
CHLORIDE BLDV-SCNC: 109 MMOL/L — HIGH (ref 96–108)
CHLORIDE BLDV-SCNC: 112 MMOL/L — HIGH (ref 96–108)
CHLORIDE SERPL-SCNC: 108 MMOL/L — SIGNIFICANT CHANGE UP (ref 96–108)
CO2 BLDV-SCNC: 23 MMOL/L — SIGNIFICANT CHANGE UP (ref 22–30)
CO2 BLDV-SCNC: 24 MMOL/L — SIGNIFICANT CHANGE UP (ref 22–30)
CO2 SERPL-SCNC: 20 MMOL/L — LOW (ref 22–31)
CREAT SERPL-MCNC: 1.01 MG/DL — SIGNIFICANT CHANGE UP (ref 0.5–1.3)
EOSINOPHIL # BLD AUTO: 0 K/UL — SIGNIFICANT CHANGE UP (ref 0–0.5)
EOSINOPHIL NFR BLD AUTO: 0 % — SIGNIFICANT CHANGE UP (ref 0–6)
FERRITIN SERPL-MCNC: 323 NG/ML — SIGNIFICANT CHANGE UP (ref 30–400)
GAS PNL BLDV: 138 MMOL/L — SIGNIFICANT CHANGE UP (ref 135–145)
GAS PNL BLDV: 139 MMOL/L — SIGNIFICANT CHANGE UP (ref 135–145)
GAS PNL BLDV: SIGNIFICANT CHANGE UP
GLUCOSE BLDV-MCNC: 109 MG/DL — HIGH (ref 70–99)
GLUCOSE BLDV-MCNC: 127 MG/DL — HIGH (ref 70–99)
GLUCOSE SERPL-MCNC: 105 MG/DL — HIGH (ref 70–99)
HCO3 BLDV-SCNC: 22 MMOL/L — SIGNIFICANT CHANGE UP (ref 21–29)
HCO3 BLDV-SCNC: 23 MMOL/L — SIGNIFICANT CHANGE UP (ref 21–29)
HCT VFR BLD CALC: 31.1 % — LOW (ref 39–50)
HCT VFR BLDA CALC: 30 % — LOW (ref 39–50)
HCT VFR BLDA CALC: 31 % — LOW (ref 39–50)
HGB BLD CALC-MCNC: 9.6 G/DL — LOW (ref 13–17)
HGB BLD CALC-MCNC: 9.9 G/DL — LOW (ref 13–17)
HGB BLD-MCNC: 9.9 G/DL — LOW (ref 13–17)
HYPOCHROMIA BLD QL: SLIGHT — SIGNIFICANT CHANGE UP
INR BLD: 1.06 RATIO — SIGNIFICANT CHANGE UP (ref 0.88–1.16)
LACTATE BLDV-MCNC: 2.2 MMOL/L — HIGH (ref 0.7–2)
LACTATE BLDV-MCNC: 2.5 MMOL/L — HIGH (ref 0.7–2)
LYMPHOCYTES # BLD AUTO: 0.39 K/UL — LOW (ref 1–3.3)
LYMPHOCYTES # BLD AUTO: 4 % — LOW (ref 13–44)
MANUAL SMEAR VERIFICATION: SIGNIFICANT CHANGE UP
MCHC RBC-ENTMCNC: 31.8 GM/DL — LOW (ref 32–36)
MCHC RBC-ENTMCNC: 32 PG — SIGNIFICANT CHANGE UP (ref 27–34)
MCV RBC AUTO: 100.6 FL — HIGH (ref 80–100)
METAMYELOCYTES # FLD: 2 % — HIGH (ref 0–0)
MONOCYTES # BLD AUTO: 0.78 K/UL — SIGNIFICANT CHANGE UP (ref 0–0.9)
MONOCYTES NFR BLD AUTO: 8 % — SIGNIFICANT CHANGE UP (ref 2–14)
NEUTROPHILS # BLD AUTO: 8.08 K/UL — HIGH (ref 1.8–7.4)
NEUTROPHILS NFR BLD AUTO: 81 % — HIGH (ref 43–77)
NEUTS BAND # BLD: 2 % — SIGNIFICANT CHANGE UP (ref 0–8)
NRBC # BLD: 0 /100 — SIGNIFICANT CHANGE UP (ref 0–0)
OVALOCYTES BLD QL SMEAR: SLIGHT — SIGNIFICANT CHANGE UP
PCO2 BLDV: 35 MMHG — SIGNIFICANT CHANGE UP (ref 35–50)
PCO2 BLDV: 38 MMHG — SIGNIFICANT CHANGE UP (ref 35–50)
PH BLDV: 7.38 — SIGNIFICANT CHANGE UP (ref 7.35–7.45)
PH BLDV: 7.44 — SIGNIFICANT CHANGE UP (ref 7.35–7.45)
PLAT MORPH BLD: NORMAL — SIGNIFICANT CHANGE UP
PLATELET # BLD AUTO: 297 K/UL — SIGNIFICANT CHANGE UP (ref 150–400)
PO2 BLDV: 31 MMHG — SIGNIFICANT CHANGE UP (ref 25–45)
PO2 BLDV: 42 MMHG — SIGNIFICANT CHANGE UP (ref 25–45)
POIKILOCYTOSIS BLD QL AUTO: SLIGHT — SIGNIFICANT CHANGE UP
POLYCHROMASIA BLD QL SMEAR: SLIGHT — SIGNIFICANT CHANGE UP
POTASSIUM BLDV-SCNC: 4.1 MMOL/L — SIGNIFICANT CHANGE UP (ref 3.5–5.3)
POTASSIUM BLDV-SCNC: 4.2 MMOL/L — SIGNIFICANT CHANGE UP (ref 3.5–5.3)
POTASSIUM SERPL-MCNC: 4.6 MMOL/L — SIGNIFICANT CHANGE UP (ref 3.5–5.3)
POTASSIUM SERPL-SCNC: 4.6 MMOL/L — SIGNIFICANT CHANGE UP (ref 3.5–5.3)
PROT SERPL-MCNC: 5.9 G/DL — LOW (ref 6–8.3)
PROTHROM AB SERPL-ACNC: 12.7 SEC — SIGNIFICANT CHANGE UP (ref 10.6–13.6)
RBC # BLD: 3.09 M/UL — LOW (ref 4.2–5.8)
RBC # FLD: 14.8 % — HIGH (ref 10.3–14.5)
RBC BLD AUTO: ABNORMAL
SAO2 % BLDV: 52 % — LOW (ref 67–88)
SAO2 % BLDV: 76 % — SIGNIFICANT CHANGE UP (ref 67–88)
SARS-COV-2 RNA SPEC QL NAA+PROBE: SIGNIFICANT CHANGE UP
SODIUM SERPL-SCNC: 141 MMOL/L — SIGNIFICANT CHANGE UP (ref 135–145)
VARIANT LYMPHS # BLD: 3 % — SIGNIFICANT CHANGE UP (ref 0–6)
WBC # BLD: 9.74 K/UL — SIGNIFICANT CHANGE UP (ref 3.8–10.5)
WBC # FLD AUTO: 9.74 K/UL — SIGNIFICANT CHANGE UP (ref 3.8–10.5)

## 2020-11-21 PROCEDURE — 71250 CT THORAX DX C-: CPT | Mod: 26

## 2020-11-21 PROCEDURE — 99223 1ST HOSP IP/OBS HIGH 75: CPT

## 2020-11-21 PROCEDURE — 93010 ELECTROCARDIOGRAM REPORT: CPT

## 2020-11-21 PROCEDURE — 71045 X-RAY EXAM CHEST 1 VIEW: CPT | Mod: 26

## 2020-11-21 PROCEDURE — 99285 EMERGENCY DEPT VISIT HI MDM: CPT | Mod: CS

## 2020-11-21 RX ORDER — ASPIRIN/CALCIUM CARB/MAGNESIUM 324 MG
81 TABLET ORAL DAILY
Refills: 0 | Status: DISCONTINUED | OUTPATIENT
Start: 2020-11-21 | End: 2020-11-23

## 2020-11-21 RX ORDER — BUDESONIDE, MICRONIZED 100 %
9 POWDER (GRAM) MISCELLANEOUS DAILY
Refills: 0 | Status: DISCONTINUED | OUTPATIENT
Start: 2020-11-21 | End: 2020-11-23

## 2020-11-21 RX ORDER — ATORVASTATIN CALCIUM 80 MG/1
10 TABLET, FILM COATED ORAL AT BEDTIME
Refills: 0 | Status: DISCONTINUED | OUTPATIENT
Start: 2020-11-21 | End: 2020-11-23

## 2020-11-21 RX ORDER — GABAPENTIN 400 MG/1
0 CAPSULE ORAL
Qty: 0 | Refills: 0 | DISCHARGE

## 2020-11-21 RX ORDER — AZITHROMYCIN 500 MG/1
500 TABLET, FILM COATED ORAL EVERY 24 HOURS
Refills: 0 | Status: DISCONTINUED | OUTPATIENT
Start: 2020-11-21 | End: 2020-11-23

## 2020-11-21 RX ORDER — ENOXAPARIN SODIUM 100 MG/ML
40 INJECTION SUBCUTANEOUS EVERY 24 HOURS
Refills: 0 | Status: DISCONTINUED | OUTPATIENT
Start: 2020-11-21 | End: 2020-11-23

## 2020-11-21 RX ORDER — RANOLAZINE 500 MG/1
500 TABLET, FILM COATED, EXTENDED RELEASE ORAL
Refills: 0 | Status: DISCONTINUED | OUTPATIENT
Start: 2020-11-21 | End: 2020-11-23

## 2020-11-21 RX ORDER — ACETAMINOPHEN 500 MG
650 TABLET ORAL EVERY 6 HOURS
Refills: 0 | Status: DISCONTINUED | OUTPATIENT
Start: 2020-11-21 | End: 2020-11-21

## 2020-11-21 RX ORDER — BUDESONIDE AND FORMOTEROL FUMARATE DIHYDRATE 160; 4.5 UG/1; UG/1
2 AEROSOL RESPIRATORY (INHALATION)
Refills: 0 | Status: DISCONTINUED | OUTPATIENT
Start: 2020-11-21 | End: 2020-11-23

## 2020-11-21 RX ORDER — SODIUM CHLORIDE 9 MG/ML
1000 INJECTION INTRAMUSCULAR; INTRAVENOUS; SUBCUTANEOUS ONCE
Refills: 0 | Status: COMPLETED | OUTPATIENT
Start: 2020-11-21 | End: 2020-11-21

## 2020-11-21 RX ORDER — CEFTRIAXONE 500 MG/1
1000 INJECTION, POWDER, FOR SOLUTION INTRAMUSCULAR; INTRAVENOUS ONCE
Refills: 0 | Status: COMPLETED | OUTPATIENT
Start: 2020-11-21 | End: 2020-11-21

## 2020-11-21 RX ORDER — ATORVASTATIN CALCIUM 80 MG/1
1 TABLET, FILM COATED ORAL
Qty: 0 | Refills: 0 | DISCHARGE

## 2020-11-21 RX ORDER — ACETAMINOPHEN 500 MG
650 TABLET ORAL EVERY 6 HOURS
Refills: 0 | Status: DISCONTINUED | OUTPATIENT
Start: 2020-11-21 | End: 2020-11-23

## 2020-11-21 RX ORDER — TAMSULOSIN HYDROCHLORIDE 0.4 MG/1
0.4 CAPSULE ORAL AT BEDTIME
Refills: 0 | Status: DISCONTINUED | OUTPATIENT
Start: 2020-11-21 | End: 2020-11-23

## 2020-11-21 RX ORDER — PANTOPRAZOLE SODIUM 20 MG/1
40 TABLET, DELAYED RELEASE ORAL
Refills: 0 | Status: DISCONTINUED | OUTPATIENT
Start: 2020-11-21 | End: 2020-11-23

## 2020-11-21 RX ORDER — CEFTRIAXONE 500 MG/1
1000 INJECTION, POWDER, FOR SOLUTION INTRAMUSCULAR; INTRAVENOUS EVERY 24 HOURS
Refills: 0 | Status: DISCONTINUED | OUTPATIENT
Start: 2020-11-21 | End: 2020-11-23

## 2020-11-21 RX ORDER — FUROSEMIDE 40 MG
10 TABLET ORAL
Qty: 0 | Refills: 0 | DISCHARGE

## 2020-11-21 RX ORDER — FINASTERIDE 5 MG/1
5 TABLET, FILM COATED ORAL DAILY
Refills: 0 | Status: DISCONTINUED | OUTPATIENT
Start: 2020-11-21 | End: 2020-11-23

## 2020-11-21 RX ORDER — AZITHROMYCIN 500 MG/1
500 TABLET, FILM COATED ORAL ONCE
Refills: 0 | Status: COMPLETED | OUTPATIENT
Start: 2020-11-21 | End: 2020-11-21

## 2020-11-21 RX ADMIN — AZITHROMYCIN 250 MILLIGRAM(S): 500 TABLET, FILM COATED ORAL at 17:00

## 2020-11-21 RX ADMIN — SODIUM CHLORIDE 1000 MILLILITER(S): 9 INJECTION INTRAMUSCULAR; INTRAVENOUS; SUBCUTANEOUS at 16:33

## 2020-11-21 RX ADMIN — RANOLAZINE 500 MILLIGRAM(S): 500 TABLET, FILM COATED, EXTENDED RELEASE ORAL at 20:34

## 2020-11-21 RX ADMIN — BUDESONIDE AND FORMOTEROL FUMARATE DIHYDRATE 2 PUFF(S): 160; 4.5 AEROSOL RESPIRATORY (INHALATION) at 20:35

## 2020-11-21 RX ADMIN — Medication 650 MILLIGRAM(S): at 22:00

## 2020-11-21 RX ADMIN — Medication 650 MILLIGRAM(S): at 20:35

## 2020-11-21 RX ADMIN — CEFTRIAXONE 100 MILLIGRAM(S): 500 INJECTION, POWDER, FOR SOLUTION INTRAMUSCULAR; INTRAVENOUS at 16:33

## 2020-11-21 NOTE — ED ADULT NURSE REASSESSMENT NOTE - NS ED NURSE REASSESS COMMENT FT1
Received report from Davida GOMEZ. Patient resting comfortably in stretcher. A&Ox4. Patient in no acute distress. Patient pending results of COVID-19 PCR and admission to inpatient medicine unit. .Plan of care discussed. Safety and comfort measures maintained.

## 2020-11-21 NOTE — ED PROVIDER NOTE - CLINICAL SUMMARY MEDICAL DECISION MAKING FREE TEXT BOX
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 No respiratory distress. Plan: Labs, abx, admit.

## 2020-11-21 NOTE — ED ADULT NURSE NOTE - OBJECTIVE STATEMENT
89 yo male from home A&OX4 ambulatory w/ assistance c/o SOB over the last several days. PT endorses SOB worse w/ exertion, denies edema/swelling. PT endorses productive green cough, denies fevers, afebrile on arrival. Pt seen by PMD, sent for outpatient CT today which confirms pneumonia. Pt denies chx pn, abd pn, n/v/d/dizziness. VS stabel, SPO2 99% rm air. IV access obtained in R wrist via 20G catheter, labs drawn and sent, imagining and results pending.

## 2020-11-21 NOTE — H&P ADULT - ATTENDING COMMENTS
Niesha Forrester MD  Division of [facility] Medicine  Cell: [phone]  Office: [phone]

## 2020-11-21 NOTE — ED ADULT NURSE NOTE - NSIMPLEMENTINTERV_GEN_ALL_ED
Implemented All Fall with Harm Risk Interventions:  [place] to call system. Call bell, personal items and telephone within reach. Instruct patient to call for assistance. Room bathroom lighting operational. Non-slip footwear when patient is off stretcher. Physically safe environment: no spills, clutter or unnecessary equipment. Stretcher in lowest position, wheels locked, appropriate side rails in place. Provide visual cue, wrist band, yellow gown, etc. Monitor gait and stability. Monitor for mental status changes and reorient to person, place, and time. Review medications for side effects contributing to fall risk. Reinforce activity limits and safety measures with patient and family. Provide visual clues: red socks.

## 2020-11-21 NOTE — ED PROVIDER NOTE - OBJECTIVE STATEMENT
89 yo male PMhx MAC infection, CAD s/p stents on ASA, GERD, HLD, SBOs, irregular heart rhythm presents to the ED c/o shortness of breath, dyspnea on exertion and cough x 1 week, found to have PNA on outpt CT today. States cough is minimally productive, had 1 episode blood tinged sputum yesterday (resolved). Was referred for outpt CT by PMD Dr. Burdick, called by radiologist after test was performed today and told PNA and to come to ED. Denies chest pain, gross hemoptysis, LE swelling, abd pain, n/v/d, dizziness, weakness, headache, recent travel, fever/chills.

## 2020-11-21 NOTE — H&P ADULT - ASSESSMENT
89M with hx of MAC infection s/p treatment, CAD, HLD, SOB presents with cough x 1 week, found to have community acquired PNA

## 2020-11-21 NOTE — H&P ADULT - NSHPLABSRESULTS_GEN_ALL_CORE
LABS:   personally reviewed                        9.9    9.74  )-----------( 297      ( 21 Nov 2020 15:19 )             31.1     11-21    141  |  108  |  27<H>  ----------------------------<  105<H>  4.6   |  20<L>  |  1.01    Ca    8.7      21 Nov 2020 15:19    TPro  5.9<L>  /  Alb  3.1<L>  /  TBili  0.5  /  DBili  x   /  AST  59<H>  /  ALT  111<H>  /  AlkPhos  146<H>  11-21    PT/INR - ( 21 Nov 2020 15:19 )   PT: 12.7 sec;   INR: 1.06 ratio         PTT - ( 21 Nov 2020 15:19 )  PTT:27.9 sec    Imaging personally reviewed:  -CT chest  < from: CT Chest No Cont (11.21.20 @ 15:17) >    LUNGS AND AIRWAYS: Patent central airways.  Patient is evidence of bronchiectasis involving the right middle lobe with new consolidation involving the lateral segment of the right middle lobe. Bronchial thickening is seen throughout. In addition there appears be some mild bronchial thickening and peribronchial changes involving the right upper lobe.  Atelectasis involving the portions of the dependent right lower lobe is seen with trace amount of pleural fluid. Small nodularity right lower lobe stable.  PLEURA: Small right pleural effusion  MEDIASTINUM AND TESFAYE: No lymphadenopathy.  VESSELS: Within normal limits.  HEART: Heart size is normal. No pericardial effusion.  CHEST WALL AND LOWER NECK:Within normal limits.  VISUALIZED UPPER ABDOMEN: Right renal cyst unchanged  BONES: Within normal limits.    IMPRESSION:  New right middle lobe consolidation superimposed upon chronic findings of bronchiectasis.

## 2020-11-21 NOTE — H&P ADULT - NSHPREVIEWOFSYSTEMS_GEN_ALL_CORE
Review of Systems:   CONSTITUTIONAL: (+) fatigue ; No fever, weight loss  EYES: No eye pain, visual disturbances, or discharge  ENMT:  No difficulty hearing, tinnitus, vertigo; No sinus or throat pain  NECK: No pain or stiffness  BREASTS: No pain, masses, or nipple discharge  RESPIRATORY: (+) productive cough, SOB  CARDIOVASCULAR: No chest pain, palpitations, dizziness, or leg swelling  GASTROINTESTINAL: No abdominal or epigastric pain. No nausea, vomiting, or hematemesis; No diarrhea or constipation. No melena or hematochezia.  GENITOURINARY: No dysuria, frequency, hematuria, or incontinence  NEUROLOGICAL: No headaches, memory loss, loss of strength, numbness, or tremors  SKIN: No itching, burning, rashes, or lesions   LYMPH NODES: No enlarged glands  ENDOCRINE: No heat or cold intolerance; No hair loss  MUSCULOSKELETAL: No joint pain or swelling; No muscle, back, or extremity pain  PSYCHIATRIC: No depression, anxiety, mood swings, or difficulty sleeping  HEME/LYMPH: No easy bruising, or bleeding gums  ALLERGY AND IMMUNOLOGIC: No hives or eczema

## 2020-11-21 NOTE — H&P ADULT - PROBLEM SELECTOR PLAN 1
coffee CT chest finding with RML PNA, (+) productive cough, weakness  - continue ceftriaxone, azithromycin  - fu blood culture, sputum cultures  - pt does not SIRS criteria at this time CT chest finding with RML PNA, (+) productive cough, weakness  - continue ceftriaxone, azithromycin  - fu blood culture, sputum cultures  - check urine legionella  - pt does not SIRS criteria at this time

## 2020-11-21 NOTE — H&P ADULT - NSHPPHYSICALEXAM_GEN_ALL_CORE
Vital Signs Last 24 Hrs  T(C): 36.8 (21 Nov 2020 13:58), Max: 36.8 (21 Nov 2020 13:58)  T(F): 98.2 (21 Nov 2020 13:58), Max: 98.2 (21 Nov 2020 13:58)  HR: 80 (21 Nov 2020 14:57) (80 - 120)  BP: 127/92 (21 Nov 2020 14:57) (111/65 - 127/92)  BP(mean): 104 (21 Nov 2020 14:57) (104 - 104)  RR: 20 (21 Nov 2020 14:57) (20 - 20)  SpO2: 99% (21 Nov 2020 14:57) (95% - 99%)  CAPILLARY BLOOD GLUCOSE        I&O's Summary      PHYSICAL EXAM:  GENERAL: NAD, well-developed  HEAD:  Atraumatic, Normocephalic  EYES: EOMI, PERRLA, conjunctiva and sclera clear  MOUTH: no oral thrush  NECK: Supple, No JVD  CHEST/LUNG: trace crackles at the right   HEART: Regular rate and rhythm; No murmurs, rubs, or gallops  ABDOMEN: Soft, Nontender, Nondistended; Bowel sounds present  EXTREMITIES:  2+ Peripheral Pulses, No clubbing, cyanosis, or edema  NEUROLOGY: AAOx3, non-focal  SKIN: No rashes or lesions

## 2020-11-21 NOTE — ED CLERICAL - NS ED CLERK NOTE PRE-ARRIVAL INFORMATION; ADDITIONAL PRE-ARRIVAL INFORMATION
CC/Reason For referral: sent by Dr Tom ( [phone])from [facility] for abnormal ct of chest that suggests PNA requiring treatment with abx. Unable to reach pcp  Preferred Consultant(if applicable): na  Who admits for you (if needed): na  Do you have documents you would like to fax over? no, images available allscipts  Would you still like to speak to an ED attending? no

## 2020-11-21 NOTE — ED ADULT NURSE NOTE - NSFALLRSKASSESSDT_ED_ALL_ED
21-Nov-2020 15:05
decreased appetite/po likely in progression of pancreatic cancer and other underlying comorbidities

## 2020-11-21 NOTE — H&P ADULT - HISTORY OF PRESENT ILLNESS
89M with hx of MAC infection s/p treatment, CAD, HLD, SOB presents with cough x 1 week. Pt reports that he has been having productive cough with weakness and dyspnea. This morning, pt was retching and had blood tinged sputum - referred to CT chest by his PMD where found to have PNA. Pt denies fever/chills, sick contacts, abdominal pain, n/v/c/d.

## 2020-11-22 DIAGNOSIS — D64.9 ANEMIA, UNSPECIFIED: ICD-10-CM

## 2020-11-22 LAB
ANION GAP SERPL CALC-SCNC: 12 MMOL/L — SIGNIFICANT CHANGE UP (ref 5–17)
BUN SERPL-MCNC: 21 MG/DL — SIGNIFICANT CHANGE UP (ref 7–23)
CALCIUM SERPL-MCNC: 8.2 MG/DL — LOW (ref 8.4–10.5)
CHLORIDE SERPL-SCNC: 107 MMOL/L — SIGNIFICANT CHANGE UP (ref 96–108)
CO2 SERPL-SCNC: 20 MMOL/L — LOW (ref 22–31)
CREAT SERPL-MCNC: 0.87 MG/DL — SIGNIFICANT CHANGE UP (ref 0.5–1.3)
FOLATE SERPL-MCNC: 13.6 NG/ML — SIGNIFICANT CHANGE UP
GLUCOSE SERPL-MCNC: 128 MG/DL — HIGH (ref 70–99)
HCT VFR BLD CALC: 28.3 % — LOW (ref 39–50)
HGB BLD-MCNC: 8.8 G/DL — LOW (ref 13–17)
LACTATE SERPL-SCNC: 3.7 MMOL/L — HIGH (ref 0.7–2)
MCHC RBC-ENTMCNC: 31.1 GM/DL — LOW (ref 32–36)
MCHC RBC-ENTMCNC: 31.7 PG — SIGNIFICANT CHANGE UP (ref 27–34)
MCV RBC AUTO: 101.8 FL — HIGH (ref 80–100)
NRBC # BLD: 0 /100 WBCS — SIGNIFICANT CHANGE UP (ref 0–0)
PLATELET # BLD AUTO: 259 K/UL — SIGNIFICANT CHANGE UP (ref 150–400)
POTASSIUM SERPL-MCNC: 3.9 MMOL/L — SIGNIFICANT CHANGE UP (ref 3.5–5.3)
POTASSIUM SERPL-SCNC: 3.9 MMOL/L — SIGNIFICANT CHANGE UP (ref 3.5–5.3)
RBC # BLD: 2.78 M/UL — LOW (ref 4.2–5.8)
RBC # FLD: 14.7 % — HIGH (ref 10.3–14.5)
SODIUM SERPL-SCNC: 139 MMOL/L — SIGNIFICANT CHANGE UP (ref 135–145)
VIT B12 SERPL-MCNC: >2000 PG/ML — HIGH (ref 232–1245)
WBC # BLD: 9.57 K/UL — SIGNIFICANT CHANGE UP (ref 3.8–10.5)
WBC # FLD AUTO: 9.57 K/UL — SIGNIFICANT CHANGE UP (ref 3.8–10.5)

## 2020-11-22 PROCEDURE — 99233 SBSQ HOSP IP/OBS HIGH 50: CPT

## 2020-11-22 RX ORDER — SODIUM CHLORIDE 9 MG/ML
1000 INJECTION INTRAMUSCULAR; INTRAVENOUS; SUBCUTANEOUS
Refills: 0 | Status: DISCONTINUED | OUTPATIENT
Start: 2020-11-22 | End: 2020-11-23

## 2020-11-22 RX ADMIN — FINASTERIDE 5 MILLIGRAM(S): 5 TABLET, FILM COATED ORAL at 11:57

## 2020-11-22 RX ADMIN — Medication 9 MILLIGRAM(S): at 06:45

## 2020-11-22 RX ADMIN — TAMSULOSIN HYDROCHLORIDE 0.4 MILLIGRAM(S): 0.4 CAPSULE ORAL at 00:45

## 2020-11-22 RX ADMIN — AZITHROMYCIN 250 MILLIGRAM(S): 500 TABLET, FILM COATED ORAL at 17:06

## 2020-11-22 RX ADMIN — ATORVASTATIN CALCIUM 10 MILLIGRAM(S): 80 TABLET, FILM COATED ORAL at 21:06

## 2020-11-22 RX ADMIN — TAMSULOSIN HYDROCHLORIDE 0.4 MILLIGRAM(S): 0.4 CAPSULE ORAL at 21:06

## 2020-11-22 RX ADMIN — BUDESONIDE AND FORMOTEROL FUMARATE DIHYDRATE 2 PUFF(S): 160; 4.5 AEROSOL RESPIRATORY (INHALATION) at 16:39

## 2020-11-22 RX ADMIN — SODIUM CHLORIDE 80 MILLILITER(S): 9 INJECTION INTRAMUSCULAR; INTRAVENOUS; SUBCUTANEOUS at 16:03

## 2020-11-22 RX ADMIN — Medication 81 MILLIGRAM(S): at 11:57

## 2020-11-22 RX ADMIN — PANTOPRAZOLE SODIUM 40 MILLIGRAM(S): 20 TABLET, DELAYED RELEASE ORAL at 06:45

## 2020-11-22 RX ADMIN — RANOLAZINE 500 MILLIGRAM(S): 500 TABLET, FILM COATED, EXTENDED RELEASE ORAL at 16:39

## 2020-11-22 RX ADMIN — RANOLAZINE 500 MILLIGRAM(S): 500 TABLET, FILM COATED, EXTENDED RELEASE ORAL at 06:44

## 2020-11-22 RX ADMIN — ENOXAPARIN SODIUM 40 MILLIGRAM(S): 100 INJECTION SUBCUTANEOUS at 06:43

## 2020-11-22 RX ADMIN — ATORVASTATIN CALCIUM 10 MILLIGRAM(S): 80 TABLET, FILM COATED ORAL at 00:21

## 2020-11-22 RX ADMIN — CEFTRIAXONE 100 MILLIGRAM(S): 500 INJECTION, POWDER, FOR SOLUTION INTRAMUSCULAR; INTRAVENOUS at 16:34

## 2020-11-22 RX ADMIN — BUDESONIDE AND FORMOTEROL FUMARATE DIHYDRATE 2 PUFF(S): 160; 4.5 AEROSOL RESPIRATORY (INHALATION) at 06:43

## 2020-11-22 NOTE — PHYSICAL THERAPY INITIAL EVALUATION ADULT - ADDITIONAL COMMENTS
Pt lives alone in an apt, no DAYA, elevator access. Previously (I) with all ADLs, ambulates without an assistive device and drives (I). Has a walk-in shower with built in shower bench, +grab bars. Owns a cane and RW. Wears glasses for vision. Pt lives alone in an apt, no DAYA, elevator access. Previously (I) with all ADLs, ambulates without an assistive device and drives (I). Has a walk-in shower with built in shower bench, +grab bars. Owns a cane and RW. Wears glasses for vision. Reports having an Aid 5d/8+hrs

## 2020-11-22 NOTE — PROGRESS NOTE ADULT - PROBLEM SELECTOR PLAN 7
DVT ppx: lovenox sq  Dispo: PT consult DVT ppx: lovenox sq  Dispo: PT consult: no skilled needs, d/c home when medically stable

## 2020-11-22 NOTE — PROGRESS NOTE ADULT - SUBJECTIVE AND OBJECTIVE BOX
Patient is a 89y old  Male who presents with a chief complaint of     SUBJECTIVE / OVERNIGHT EVENTS: Patient seen and examined at bedside. States that he feels better since admission, his MARINO is improving, jus feels fatigued. He still has productive cough. Denies any Chills/fevers, SOB, abd pain, diarrhea and n/v. No acute events overnight.     ROS:  All other review of systems negative    Allergies    Cipro (Rash)  penicillins (Unknown)    Intolerances        MEDICATIONS  (STANDING):  aspirin enteric coated 81 milliGRAM(s) Oral daily  atorvastatin 10 milliGRAM(s) Oral at bedtime  azithromycin  IVPB 500 milliGRAM(s) IV Intermittent every 24 hours  buDESOnide    EC Capsule 9 milliGRAM(s) Oral daily  budesonide 160 MICROgram(s)/formoterol 4.5 MICROgram(s) Inhaler 2 Puff(s) Inhalation two times a day  cefTRIAXone   IVPB 1000 milliGRAM(s) IV Intermittent every 24 hours  enoxaparin Injectable 40 milliGRAM(s) SubCutaneous every 24 hours  finasteride 5 milliGRAM(s) Oral daily  pantoprazole    Tablet 40 milliGRAM(s) Oral before breakfast  ranolazine 500 milliGRAM(s) Oral two times a day  sodium chloride 0.9%. 1000 milliLiter(s) (80 mL/Hr) IV Continuous <Continuous>  tamsulosin 0.4 milliGRAM(s) Oral at bedtime    MEDICATIONS  (PRN):  acetaminophen   Tablet .. 650 milliGRAM(s) Oral every 6 hours PRN Temp greater or equal to 38C (100.4F), Mild Pain (1 - 3)      Vital Signs Last 24 Hrs  T(C): 36.9 (22 Nov 2020 06:46), Max: 36.9 (21 Nov 2020 18:22)  T(F): 98.5 (22 Nov 2020 06:46), Max: 98.5 (21 Nov 2020 22:40)  HR: 115 (22 Nov 2020 09:05) (67 - 115)  BP: 126/67 (22 Nov 2020 09:05) (110/59 - 146/77)  BP(mean): 104 (21 Nov 2020 14:57) (104 - 104)  RR: 20 (22 Nov 2020 06:46) (18 - 21)  SpO2: 96% (22 Nov 2020 09:05) (96% - 100%)  CAPILLARY BLOOD GLUCOSE        I&O's Summary    21 Nov 2020 07:01  -  22 Nov 2020 07:00  --------------------------------------------------------  IN: 240 mL / OUT: 0 mL / NET: 240 mL    22 Nov 2020 07:01  -  22 Nov 2020 14:25  --------------------------------------------------------  IN: 240 mL / OUT: 0 mL / NET: 240 mL        PHYSICAL EXAM:  GENERAL: thin, elderly , head of hearing   HEAD:  Atraumatic, Normocephalic  EYES: EOMI, PERRLA, conjunctiva and sclera clear  NECK: Supple, No JVD  CHEST/LUNG: Right lung crackles   HEART: Regular rate and rhythm; No murmurs, rubs, or gallops  ABDOMEN: Soft, Nontender, Nondistended; Bowel sounds present  EXTREMITIES:  2+ Peripheral Pulses, No edema  NEUROLOGY: AAOx3, non-focal  PSYCH: calm  SKIN: No rashes or lesions    LABS:                        8.8    9.57  )-----------( 259      ( 22 Nov 2020 10:35 )             28.3     11-22    139  |  107  |  21  ----------------------------<  128<H>  3.9   |  20<L>  |  0.87    Ca    8.2<L>      22 Nov 2020 10:35    TPro  5.9<L>  /  Alb  3.1<L>  /  TBili  0.5  /  DBili  x   /  AST  59<H>  /  ALT  111<H>  /  AlkPhos  146<H>  11-21    PT/INR - ( 21 Nov 2020 15:19 )   PT: 12.7 sec;   INR: 1.06 ratio         PTT - ( 21 Nov 2020 15:19 )  PTT:27.9 sec          RADIOLOGY & ADDITIONAL TESTS:    Care Discussed with Consultants/Other Providers: Medicine NP

## 2020-11-22 NOTE — PROGRESS NOTE ADULT - PROBLEM SELECTOR PLAN 2
hb 9.9>8.8 appears dilutional s/p 1L Ns bolus   - No signs of active bleeding  - monitor CBC  - tx for Hb <8 hb 9.9>8.8 appears dilutional s/p 1L Ns bolus   - No signs of active bleeding  - monitor CBC

## 2020-11-22 NOTE — PHYSICAL THERAPY INITIAL EVALUATION ADULT - PERTINENT HX OF CURRENT PROBLEM, REHAB EVAL
89M with hx of MAC infection s/p treatment, CAD, HLD, SOB presents with cough x 1 week. Pt reports that he has been having productive cough with weakness and dyspnea. This morning, pt was retching and had blood tinged sputum - referred to CT chest by his PMD where found to have PNA.

## 2020-11-22 NOTE — PROGRESS NOTE ADULT - PROBLEM SELECTOR PLAN 1
CT chest finding with RML PNA, (+) productive cough, weakness, MARINO improving   - continue ceftriaxone, azithromycin  - f/u blood culture, sputum cultures  - check urine legionella  - pt does not SIRS criteria at this time CT chest finding with RML PNA, (+) productive cough, weakness, MARINO improving   - continue ceftriaxone, azithromycin  - f/u blood culture, sputum cultures  - check urine legionella  - pt does not meet SIRS criteria at this time  - lactate 3.7, started on maintenance IVF   - monitor lactate till normalizes

## 2020-11-23 ENCOUNTER — FORM ENCOUNTER (OUTPATIENT)
Age: 85
End: 2020-11-23

## 2020-11-23 ENCOUNTER — TRANSCRIPTION ENCOUNTER (OUTPATIENT)
Age: 85
End: 2020-11-23

## 2020-11-23 VITALS
DIASTOLIC BLOOD PRESSURE: 62 MMHG | HEART RATE: 85 BPM | OXYGEN SATURATION: 96 % | SYSTOLIC BLOOD PRESSURE: 129 MMHG | TEMPERATURE: 98 F | RESPIRATION RATE: 18 BRPM

## 2020-11-23 LAB
ANION GAP SERPL CALC-SCNC: 15 MMOL/L — SIGNIFICANT CHANGE UP (ref 5–17)
BUN SERPL-MCNC: 21 MG/DL — SIGNIFICANT CHANGE UP (ref 7–23)
CALCIUM SERPL-MCNC: 8.5 MG/DL — SIGNIFICANT CHANGE UP (ref 8.4–10.5)
CHLORIDE SERPL-SCNC: 111 MMOL/L — HIGH (ref 96–108)
CO2 SERPL-SCNC: 18 MMOL/L — LOW (ref 22–31)
CREAT SERPL-MCNC: 0.97 MG/DL — SIGNIFICANT CHANGE UP (ref 0.5–1.3)
GLUCOSE SERPL-MCNC: 88 MG/DL — SIGNIFICANT CHANGE UP (ref 70–99)
HCT VFR BLD CALC: 30.7 % — LOW (ref 39–50)
HGB BLD-MCNC: 9.8 G/DL — LOW (ref 13–17)
LACTATE SERPL-SCNC: 1.5 MMOL/L — SIGNIFICANT CHANGE UP (ref 0.7–2)
LEGIONELLA AG UR QL: NEGATIVE — SIGNIFICANT CHANGE UP
MCHC RBC-ENTMCNC: 31.7 PG — SIGNIFICANT CHANGE UP (ref 27–34)
MCHC RBC-ENTMCNC: 31.9 GM/DL — LOW (ref 32–36)
MCV RBC AUTO: 99.4 FL — SIGNIFICANT CHANGE UP (ref 80–100)
NRBC # BLD: 0 /100 WBCS — SIGNIFICANT CHANGE UP (ref 0–0)
PLATELET # BLD AUTO: 290 K/UL — SIGNIFICANT CHANGE UP (ref 150–400)
POTASSIUM SERPL-MCNC: 4 MMOL/L — SIGNIFICANT CHANGE UP (ref 3.5–5.3)
POTASSIUM SERPL-SCNC: 4 MMOL/L — SIGNIFICANT CHANGE UP (ref 3.5–5.3)
RBC # BLD: 3.09 M/UL — LOW (ref 4.2–5.8)
RBC # FLD: 14.4 % — SIGNIFICANT CHANGE UP (ref 10.3–14.5)
SODIUM SERPL-SCNC: 144 MMOL/L — SIGNIFICANT CHANGE UP (ref 135–145)
WBC # BLD: 8.73 K/UL — SIGNIFICANT CHANGE UP (ref 3.8–10.5)
WBC # FLD AUTO: 8.73 K/UL — SIGNIFICANT CHANGE UP (ref 3.8–10.5)

## 2020-11-23 PROCEDURE — 71250 CT THORAX DX C-: CPT

## 2020-11-23 PROCEDURE — 87040 BLOOD CULTURE FOR BACTERIA: CPT

## 2020-11-23 PROCEDURE — 82803 BLOOD GASES ANY COMBINATION: CPT

## 2020-11-23 PROCEDURE — 80053 COMPREHEN METABOLIC PANEL: CPT

## 2020-11-23 PROCEDURE — 85384 FIBRINOGEN ACTIVITY: CPT

## 2020-11-23 PROCEDURE — 85027 COMPLETE CBC AUTOMATED: CPT

## 2020-11-23 PROCEDURE — 82607 VITAMIN B-12: CPT

## 2020-11-23 PROCEDURE — 83605 ASSAY OF LACTIC ACID: CPT

## 2020-11-23 PROCEDURE — 82746 ASSAY OF FOLIC ACID SERUM: CPT

## 2020-11-23 PROCEDURE — 84295 ASSAY OF SERUM SODIUM: CPT

## 2020-11-23 PROCEDURE — 85018 HEMOGLOBIN: CPT

## 2020-11-23 PROCEDURE — 80048 BASIC METABOLIC PNL TOTAL CA: CPT

## 2020-11-23 PROCEDURE — 97161 PT EVAL LOW COMPLEX 20 MIN: CPT

## 2020-11-23 PROCEDURE — 86769 SARS-COV-2 COVID-19 ANTIBODY: CPT

## 2020-11-23 PROCEDURE — 36415 COLL VENOUS BLD VENIPUNCTURE: CPT

## 2020-11-23 PROCEDURE — 99239 HOSP IP/OBS DSCHRG MGMT >30: CPT

## 2020-11-23 PROCEDURE — U0003: CPT

## 2020-11-23 PROCEDURE — 82330 ASSAY OF CALCIUM: CPT

## 2020-11-23 PROCEDURE — 93005 ELECTROCARDIOGRAM TRACING: CPT

## 2020-11-23 PROCEDURE — 82947 ASSAY GLUCOSE BLOOD QUANT: CPT

## 2020-11-23 PROCEDURE — 82435 ASSAY OF BLOOD CHLORIDE: CPT

## 2020-11-23 PROCEDURE — 85014 HEMATOCRIT: CPT

## 2020-11-23 PROCEDURE — 87449 NOS EACH ORGANISM AG IA: CPT

## 2020-11-23 PROCEDURE — 82728 ASSAY OF FERRITIN: CPT

## 2020-11-23 PROCEDURE — 71045 X-RAY EXAM CHEST 1 VIEW: CPT

## 2020-11-23 PROCEDURE — 85730 THROMBOPLASTIN TIME PARTIAL: CPT

## 2020-11-23 PROCEDURE — 94640 AIRWAY INHALATION TREATMENT: CPT

## 2020-11-23 PROCEDURE — 84132 ASSAY OF SERUM POTASSIUM: CPT

## 2020-11-23 PROCEDURE — 85025 COMPLETE CBC W/AUTO DIFF WBC: CPT

## 2020-11-23 PROCEDURE — 85610 PROTHROMBIN TIME: CPT

## 2020-11-23 PROCEDURE — 99285 EMERGENCY DEPT VISIT HI MDM: CPT | Mod: 25

## 2020-11-23 RX ORDER — CEFDINIR 250 MG/5ML
1 POWDER, FOR SUSPENSION ORAL
Qty: 10 | Refills: 0
Start: 2020-11-23 | End: 2020-11-27

## 2020-11-23 RX ORDER — AZITHROMYCIN 500 MG/1
1 TABLET, FILM COATED ORAL
Qty: 3 | Refills: 0
Start: 2020-11-23 | End: 2020-11-25

## 2020-11-23 RX ADMIN — FINASTERIDE 5 MILLIGRAM(S): 5 TABLET, FILM COATED ORAL at 11:28

## 2020-11-23 RX ADMIN — PANTOPRAZOLE SODIUM 40 MILLIGRAM(S): 20 TABLET, DELAYED RELEASE ORAL at 06:35

## 2020-11-23 RX ADMIN — RANOLAZINE 500 MILLIGRAM(S): 500 TABLET, FILM COATED, EXTENDED RELEASE ORAL at 06:34

## 2020-11-23 RX ADMIN — Medication 81 MILLIGRAM(S): at 11:28

## 2020-11-23 RX ADMIN — BUDESONIDE AND FORMOTEROL FUMARATE DIHYDRATE 2 PUFF(S): 160; 4.5 AEROSOL RESPIRATORY (INHALATION) at 06:34

## 2020-11-23 RX ADMIN — ENOXAPARIN SODIUM 40 MILLIGRAM(S): 100 INJECTION SUBCUTANEOUS at 06:34

## 2020-11-23 RX ADMIN — Medication 9 MILLIGRAM(S): at 06:34

## 2020-11-23 NOTE — PROGRESS NOTE ADULT - PROBLEM SELECTOR PLAN 2
Hb improved to 9.8 initial drop likely dilutional   - No signs of active bleeding  - f/u with PCP outpt for further workup

## 2020-11-23 NOTE — PROGRESS NOTE ADULT - PROBLEM SELECTOR PLAN 1
CT chest finding with RML PNA, (+) productive cough, weakness, MARINO resolved    - c/w ceftriaxone, azithromycin  - f/u blood culture: NGTD  - check urine legionella if neg d/c azithromycin   - pt does not meet SIRS criteria at this time  - lactate normalized s/p IVF  - d/c today on Cefdinir 300 mg BID last day 11/26

## 2020-11-23 NOTE — DISCHARGE NOTE PROVIDER - NSDCCPCAREPLAN_GEN_ALL_CORE_FT
PRINCIPAL DISCHARGE DIAGNOSIS  Diagnosis: Pneumonia  Assessment and Plan of Treatment: - CT chest finding with RML PNA, (+) productive cough, weakness- Symtoms are improving   - Received IV ceftriaxone, azithromycin x 3 days  - Blood cultures shows no growth (preliminary result)   - urine legionella  - pt does not meet SIRS criteria at this time  - lactate 3.7, improved to 1.5 after IVF   - Follow up geriatrics after discharge      SECONDARY DISCHARGE DIAGNOSES  Diagnosis: Anemia  Assessment and Plan of Treatment: - No signs of active bleeding  - Recent Hgb/ Hct shows 9.8/30.7    Diagnosis: CAD (coronary artery disease) of bypass graft  Assessment and Plan of Treatment: stable  - continue asa 81mg daily.    Diagnosis: Crohn disease  Assessment and Plan of Treatment: stable at this time  - continue budesonide 9mg daily.       Diagnosis: Hyperlipidemia, unspecified hyperlipidemia type  Assessment and Plan of Treatment: continue lipitor 10mg daily.    Diagnosis: BPH (benign prostatic hyperplasia)  Assessment and Plan of Treatment: continue flomax, finasteride.     PRINCIPAL DISCHARGE DIAGNOSIS  Diagnosis: Pneumonia  Assessment and Plan of Treatment: - CT chest finding with RML PNA, (+) productive cough, weakness- Symtoms are improving   - Received IV ceftriaxone, azithromycin x 3 days  - Blood cultures shows no growth (preliminary result)   - pt does not meet SIRS criteria at this time  - lactate 3.7, improved to 1.5 after IVF   - Complete PO azithromycin x 3 more days to complete 5 day course  - Complete PO Cefdinir x 5 more days to complete 7 day course  - Follow up geriatrics after discharge      SECONDARY DISCHARGE DIAGNOSES  Diagnosis: Anemia  Assessment and Plan of Treatment: - No signs of active bleeding  - Recent Hgb/ Hct shows 9.8/30.7    Diagnosis: CAD (coronary artery disease) of bypass graft  Assessment and Plan of Treatment: stable  - continue asa 81mg daily.    Diagnosis: Crohn disease  Assessment and Plan of Treatment: stable at this time  - continue budesonide 9mg daily.       Diagnosis: Hyperlipidemia, unspecified hyperlipidemia type  Assessment and Plan of Treatment: continue lipitor 10mg daily.    Diagnosis: BPH (benign prostatic hyperplasia)  Assessment and Plan of Treatment: continue flomax, finasteride.

## 2020-11-23 NOTE — PROGRESS NOTE ADULT - CONVERSATION DETAILS
Patient states that he lives in [facility]. His wife passed away 2 years ago. He lives alone, has a private aid that comes to help him with his ADLs like cleaning and cooking. He states he has been of good health until this year; when he was dx with Crohn's disease and PNA for the second time. He expressed the need for finding a PCP and his prior one retired and he only sees specialist (cardiology, pulm and GI). His next of kin is his Dtr Karla Bobby. He does not have Advance care directive, currently he is full code. further discussion to continue outpt at Geriatric clinic.

## 2020-11-23 NOTE — PROGRESS NOTE ADULT - PROBLEM SELECTOR PLAN 7
DVT ppx: lovenox sq  Dispo: PT consult: no skilled needs, lives in [facility]  d/c home today Cefdinir 300 mg BID last dose 11/26. He is a STAR PNA Patient, f/u appointment made with Geriatric clinic Dr. Nazario at [address] [place] [address] on 12/9 at 10 am. Time spent 45 min. DVT ppx: lovenox sq  Dispo: PT consult: no skilled needs, lives in [facility]  d/c home today Cefdinir 300 mg BID last dose 11/26. He is a STAR PNA Patient, f/u appointment made with Geriatric clinic Dr. Nazario at [address] [place] [address] on 12/9 at 10 am. Time spent 45 min.   Patient would like to go home today, urine legionella pending, he is medically stable for d/c, will d/c home on azithromycin for total 5 days

## 2020-11-23 NOTE — DISCHARGE NOTE NURSING/CASE MANAGEMENT/SOCIAL WORK - PATIENT PORTAL LINK FT
You can access the FollowMyHealth Patient Portal offered by [facility] by registering at the following website: [email or web address]. By joining [facility]’s FollowMyHealth portal, you will also be able to view your health information using other applications (apps) compatible with our system.

## 2020-11-23 NOTE — DISCHARGE NOTE PROVIDER - NSDCFUSCHEDAPPT_GEN_ALL_CORE_FT
CHER GEORGE ; 12/01/2020 ; [facility] Card[facility] [address] [facility]  CHER GEORGE ; 12/09/2020 ; [facility] [address]  CHER GEORGE ; 01/05/2021 ; [facility] Gastro [address]  CHER GEORGE ; 01/12/2021 ; [facility] Card[facility] [address] [facility]

## 2020-11-23 NOTE — DISCHARGE NOTE PROVIDER - NSDCMRMEDTOKEN_GEN_ALL_CORE_FT
acetaminophen 325 mg oral tablet: 2 tab(s) orally every 6 hours, As needed, Mild Pain (1 - 3)  Aspirin Enteric Coated 81 mg oral delayed release tablet: 1 tab(s) orally once a day  atorvastatin 10 mg oral tablet: 1 tab(s) orally once a day  Avodart 0.5 mg oral capsule: 1 cap(s) orally once a day  budesonide 9 mg oral tablet, extended release: 1 tab(s) orally once a day (in the morning)  Flomax 0.4 mg oral capsule: orally 2 times a day  ocular lubricant ophthalmic solution: 1 drop(s) to each affected eye once a day  Protonix 40 mg oral delayed release tablet: 1 tab(s) orally once a day  Ranexa 500 mg oral tablet, extended release: 1 tab(s) orally 2 times a day   acetaminophen 325 mg oral tablet: 2 tab(s) orally every 6 hours, As needed, Mild Pain (1 - 3)  Aspirin Enteric Coated 81 mg oral delayed release tablet: 1 tab(s) orally once a day  atorvastatin 10 mg oral tablet: 1 tab(s) orally once a day  Avodart 0.5 mg oral capsule: 1 cap(s) orally once a day  budesonide 9 mg oral tablet, extended release: 1 tab(s) orally once a day (in the morning)  cefdinir 300 mg oral capsule: 1 cap(s) orally 2 times a day   Flomax 0.4 mg oral capsule: orally 2 times a day  ocular lubricant ophthalmic solution: 1 drop(s) to each affected eye once a day  Protonix 40 mg oral delayed release tablet: 1 tab(s) orally once a day  Ranexa 500 mg oral tablet, extended release: 1 tab(s) orally 2 times a day  Zithromax 500 mg oral tablet: 1 tab(s) orally once a day

## 2020-11-23 NOTE — DISCHARGE NOTE PROVIDER - HOSPITAL COURSE
89M with hx of MAC infection s/p treatment, CAD, HLD, SOB presents with cough x 1 week, found to have community acquired PNA. CT chest finding consistent with RML PNA. Pt had (+) productive cough, weakness, MARINO but it's improving. Pt received IV ceftriaxone and azithromycin x 3 days. Prelim bcx shows no growth. Urine legionella shows ----. Pt does not meet SIRS criteria at this time. Lactate was 3.7, improved to 1.5 after IVF. Pt also found to have Anemia but no signs of active bleeding. Most recent H&H shows 9.8/30.7.   Pt is medically cleared by Dr. Hernandez to [place] home w/ follow up with Geriatrics. 89M with hx of MAC infection s/p treatment, CAD, HLD, SOB presents with cough x 1 week, found to have community acquired PNA. CT chest finding consistent with RML PNA. Pt had (+) productive cough, weakness, MARINO but it's improving. Pt received IV ceftriaxone and azithromycin x 3 days. Prelim bcx shows no growth. Urine legionella is pending. Pt will complete abx course w/ po azithromycin and cefdinir. Pt does not meet SIRS criteria at this time. Lactate was 3.7, improved to 1.5 after IVF. Pt also found to have Anemia but no signs of active bleeding. Most recent H&H shows 9.8/30.7.   Pt is medically cleared by Dr. Hernandez to [place] home w/ follow up with Geriatrics. 89M with hx of MAC infection s/p treatment, CAD, HLD, SOB presents with cough x 1 week, found to have community acquired PNA. CT chest finding consistent with RML PNA. Pt had (+) productive cough, weakness, MARINO but it's improving. Pt received IV ceftriaxone and azithromycin x 3 days. Prelim bcx shows no growth. Paient informed this morning that urine legionella tests are neg and he can stop azithromycin. Patient expressed understanding and states he wont take azithromycin. Pt will complete abx course w/ po cefdinir. Pt does not meet SIRS criteria at this time. Lactate was 3.7, improved to 1.5 after IVF. Pt also found to have Anemia but no signs of active bleeding. Most recent H&H stable 9.8/30.7, needs to follow up with PCP. He states that he does not have a PCP outpt and would like to be referred to one. Appointment made with [facility]. Pt is medically cleared for dc home w/ follow up with Geriatrics. He has not Skilled needs.

## 2020-11-23 NOTE — PROGRESS NOTE ADULT - SUBJECTIVE AND OBJECTIVE BOX
Patient is a 89y old  Male who presents with a chief complaint of Pneumonia (23 Nov 2020 11:53)      SUBJECTIVE / OVERNIGHT EVENTS: Patient seen and examined at bedside. States he feels well, denies SOB, MARINO, CP, abd pain and n/v. States that his cough is improving. He is able to walk w/o any assistance or complaints     ROS:  All other review of systems negative    Allergies    Cipro (Rash)  penicillins (Unknown)    Intolerances        MEDICATIONS  (STANDING):  aspirin enteric coated 81 milliGRAM(s) Oral daily  atorvastatin 10 milliGRAM(s) Oral at bedtime  azithromycin  IVPB 500 milliGRAM(s) IV Intermittent every 24 hours  buDESOnide    EC Capsule 9 milliGRAM(s) Oral daily  budesonide 160 MICROgram(s)/formoterol 4.5 MICROgram(s) Inhaler 2 Puff(s) Inhalation two times a day  cefTRIAXone   IVPB 1000 milliGRAM(s) IV Intermittent every 24 hours  enoxaparin Injectable 40 milliGRAM(s) SubCutaneous every 24 hours  finasteride 5 milliGRAM(s) Oral daily  pantoprazole    Tablet 40 milliGRAM(s) Oral before breakfast  ranolazine 500 milliGRAM(s) Oral two times a day  sodium chloride 0.9%. 1000 milliLiter(s) (80 mL/Hr) IV Continuous <Continuous>  tamsulosin 0.4 milliGRAM(s) Oral at bedtime    MEDICATIONS  (PRN):  acetaminophen   Tablet .. 650 milliGRAM(s) Oral every 6 hours PRN Temp greater or equal to 38C (100.4F), Mild Pain (1 - 3)      Vital Signs Last 24 Hrs  T(C): 36.8 (23 Nov 2020 06:44), Max: 37.2 (22 Nov 2020 16:28)  T(F): 98.2 (23 Nov 2020 06:44), Max: 98.9 (22 Nov 2020 16:28)  HR: 75 (23 Nov 2020 06:44) (70 - 98)  BP: 137/64 (23 Nov 2020 06:44) (114/59 - 137/64)  BP(mean): --  RR: 20 (23 Nov 2020 06:44) (18 - 20)  SpO2: 98% (23 Nov 2020 06:44) (96% - 100%)  CAPILLARY BLOOD GLUCOSE        I&O's Summary    22 Nov 2020 07:01 - 23 Nov 2020 07:00  --------------------------------------------------------  IN: 2091 mL / OUT: 850 mL / NET: 1241 mL    23 Nov 2020 07:01  -  23 Nov 2020 13:08  --------------------------------------------------------  IN: 240 mL / OUT: 400 mL / NET: -160 mL        PHYSICAL EXAM:  GENERAL: NAD, well-developed  HEAD:  Atraumatic, Normocephalic  EYES: EOMI, PERRLA, conjunctiva and sclera clear  NECK: Supple, No JVD  CHEST/LUNG: Clear to auscultation bilaterally; No wheeze  HEART: Regular rate and rhythm; No murmurs, rubs, or gallops  ABDOMEN: Soft, Nontender, Nondistended; Bowel sounds present  EXTREMITIES:  2+ Peripheral Pulses, No clubbing, cyanosis, or edema  NEUROLOGY: AAOx3, non-focal  PSYCH: calm  SKIN: No rashes or lesions    LABS:                        9.8    8.73  )-----------( 290      ( 23 Nov 2020 10:15 )             30.7     11-23    144  |  111<H>  |  21  ----------------------------<  88  4.0   |  18<L>  |  0.97    Ca    8.5      23 Nov 2020 10:15    TPro  5.9<L>  /  Alb  3.1<L>  /  TBili  0.5  /  DBili  x   /  AST  59<H>  /  ALT  111<H>  /  AlkPhos  146<H>  11-21    PT/INR - ( 21 Nov 2020 15:19 )   PT: 12.7 sec;   INR: 1.06 ratio         PTT - ( 21 Nov 2020 15:19 )  PTT:27.9 sec          RADIOLOGY & ADDITIONAL TESTS:    Care Discussed with Consultants/Other Providers: Medicine NP

## 2020-11-23 NOTE — PROGRESS NOTE ADULT - ATTENDING COMMENTS
Dr. Corinna Hernandez   Division of Hospital Medicine  [facility]   Pager: [phone]
Dr. Corinna Hernandez   Division of Hospital Medicine  [facility]   Pager: [phone]

## 2020-11-23 NOTE — DISCHARGE NOTE PROVIDER - CARE PROVIDER_API CALL
Danelle Nazario)  Geriatric Medicine; Internal Medicine  [address] [place] [address], [address]  [place] [address]  Phone: [phone]  Fax: [phone]  Scheduled Appointment: 12/09/2020 10:00 AM

## 2020-11-24 ENCOUNTER — NON-APPOINTMENT (OUTPATIENT)
Age: 85
End: 2020-11-24

## 2020-11-24 LAB
SARS-COV-2 IGG SERPL QL IA: NEGATIVE — SIGNIFICANT CHANGE UP
SARS-COV-2 IGM SERPL IA-ACNC: <0.1 INDEX — SIGNIFICANT CHANGE UP

## 2020-11-24 RX ORDER — GABAPENTIN 300 MG/1
300 CAPSULE ORAL
Qty: 270 | Refills: 0 | Status: DISCONTINUED | COMMUNITY
Start: 2017-05-03 | End: 2020-11-24

## 2020-11-24 RX ORDER — BUDESONIDE 9 MG/1
9 TABLET, EXTENDED RELEASE ORAL
Qty: 30 | Refills: 0 | Status: DISCONTINUED | COMMUNITY
Start: 2020-10-14 | End: 2020-11-24

## 2020-11-24 RX ORDER — MUPIROCIN 20 MG/G
2 OINTMENT TOPICAL
Qty: 22 | Refills: 0 | Status: DISCONTINUED | COMMUNITY
Start: 2017-07-17 | End: 2020-11-24

## 2020-11-24 RX ORDER — AMLODIPINE BESYLATE 2.5 MG/1
2.5 TABLET ORAL DAILY
Qty: 30 | Refills: 1 | Status: DISCONTINUED | COMMUNITY
Start: 2020-11-02 | End: 2020-11-24

## 2020-11-24 RX ORDER — TAMSULOSIN HYDROCHLORIDE 0.4 MG/1
0.4 CAPSULE ORAL
Qty: 180 | Refills: 3 | Status: DISCONTINUED | COMMUNITY
Start: 2019-11-14 | End: 2020-11-24

## 2020-11-24 RX ORDER — DUTASTERIDE 0.5 MG/1
0.5 CAPSULE, LIQUID FILLED ORAL DAILY
Qty: 90 | Refills: 1 | Status: DISCONTINUED | COMMUNITY
Start: 2017-05-31 | End: 2020-11-24

## 2020-11-26 LAB
CULTURE RESULTS: SIGNIFICANT CHANGE UP
CULTURE RESULTS: SIGNIFICANT CHANGE UP
SPECIMEN SOURCE: SIGNIFICANT CHANGE UP
SPECIMEN SOURCE: SIGNIFICANT CHANGE UP

## 2020-11-27 ENCOUNTER — APPOINTMENT (OUTPATIENT)
Dept: CARE COORDINATION | Facility: HOME HEALTH | Age: 85
End: 2020-11-27
Payer: MEDICARE

## 2020-11-27 VITALS
SYSTOLIC BLOOD PRESSURE: 110 MMHG | RESPIRATION RATE: 17 BRPM | HEART RATE: 90 BPM | OXYGEN SATURATION: 98 % | TEMPERATURE: 97.5 F | DIASTOLIC BLOOD PRESSURE: 70 MMHG

## 2020-11-27 PROCEDURE — 99348 HOME/RES VST EST LOW MDM 30: CPT

## 2020-11-27 RX ORDER — TAMSULOSIN HYDROCHLORIDE 0.4 MG/1
0.4 CAPSULE ORAL
Qty: 180 | Refills: 0 | Status: DISCONTINUED | COMMUNITY
Start: 2017-05-31 | End: 2020-11-27

## 2020-11-27 RX ORDER — ATORVASTATIN CALCIUM 10 MG/1
10 TABLET, FILM COATED ORAL
Qty: 30 | Refills: 2 | Status: DISCONTINUED | COMMUNITY
Start: 2018-10-26 | End: 2020-11-27

## 2020-11-27 NOTE — PHYSICAL EXAM
[Pedal Pulses Present] : the pedal pulses are present [No Edema] : there was no peripheral edema [Normal] : affect was normal and insight and judgment were intact

## 2020-11-27 NOTE — DISCUSSION/SUMMARY
[FreeTextEntry1] : This patient is being engaged for the Program through [employer] [facility] \par Clinical History: 89M with hx of MAC infection s/p treatment, CAD, HLD, SOB presents with cough x 1 week, found to have community acquired PNA. CT chest finding consistent with RML PNA.\par CM Assessment (8P)s \par 1.Principle Diagnosis: PNA\par 2.Poly-pharmacy/Med Adherence: positive\par 3.Psychological Screen (PHQ2): negative\par 4.Physical Limitations/Pain: positive\par 5.Health Literacy:negative\par 6.Patient Support: unsure, reports having a friend who is helpful\par 7.Palliative Care: negative\par 8.Prior Hospitalization: negative\par Overall Risk (low, medium, high): moderate    \par 8 P Risk Score =____/8  \par Documentation of Engagement: Spoke with patient for enrollment, reports feeling fatigued otherwise ok, denies any other issues, reports HCS opened case today with RN  \par Care Navigator contact info provided and role explained. Patient was encouraged to call CN with any issues, concerns or questions. Home visit scheduled for thi s week\par \par \par \par \par

## 2020-11-27 NOTE — HISTORY OF PRESENT ILLNESS
[FreeTextEntry1] : f/u hospitalization [de-identified] : 89M with hx of MAC infection s/p treatment, CAD, HLD, SOB presents with cough x 1 week, found to have community acquired PNA. CT chest finding consistent with RML PNA\par PNA: denies SOB, lightheadedness, fever, chills, CP, MARINO, reports improvement from a few days ago. \par CAD/HLD: denies any issues\par Has private HHA 5days/week\par UTD with flu/pneum vac\par \par Scheduled with gerontologist in about 1 week\par \par TCM COVID-19 screening protocol reviewed with patient and/or caregiver and denies any signs and symptoms.  Patient and/or care giver denies any contact with a suspect or known COVID-19 case wtihin the last 14 days.  Patient and/or caregiver have tested negative for COVID-19. PAtient and/or caregiver does not live in an assisted living or skilled nursing facility.  PAtient and/or caregiver [place].\par \par \par Patient and/or caregiver verbalized understanding of the need to wear a mask or face covering during visit or can be provided with one if needed. PAtient and/or caregiver verbalized understanding that a temperature is to be taken on day of visit and if greater than 100 degrees F (37.8C) to inform the care navigator prior to visit. The patient and/or caregiver verbalized understanding that the care navigator will be in full PPE, will require a clear area to don/doff, and will have own waste bag to dispose of PPE in the home at the conclusion of the visit.\par

## 2020-12-01 ENCOUNTER — APPOINTMENT (OUTPATIENT)
Dept: CARDIOLOGY | Facility: CLINIC | Age: 85
End: 2020-12-01
Payer: MEDICARE

## 2020-12-03 ENCOUNTER — NON-APPOINTMENT (OUTPATIENT)
Age: 85
End: 2020-12-03

## 2020-12-09 ENCOUNTER — APPOINTMENT (OUTPATIENT)
Dept: GERIATRICS | Facility: CLINIC | Age: 85
End: 2020-12-09
Payer: MEDICARE

## 2020-12-09 ENCOUNTER — INPATIENT (INPATIENT)
Facility: HOSPITAL | Age: 85
LOS: 2 days | Discharge: HOME | End: 2020-12-12
Attending: STUDENT IN AN ORGANIZED HEALTH CARE EDUCATION/TRAINING PROGRAM | Admitting: STUDENT IN AN ORGANIZED HEALTH CARE EDUCATION/TRAINING PROGRAM
Payer: MEDICARE

## 2020-12-09 VITALS
HEART RATE: 81 BPM | OXYGEN SATURATION: 100 % | DIASTOLIC BLOOD PRESSURE: 64 MMHG | RESPIRATION RATE: 18 BRPM | SYSTOLIC BLOOD PRESSURE: 110 MMHG | HEIGHT: 66 IN | TEMPERATURE: 98 F

## 2020-12-09 VITALS
TEMPERATURE: 97.2 F | RESPIRATION RATE: 15 BRPM | SYSTOLIC BLOOD PRESSURE: 80 MMHG | OXYGEN SATURATION: 99 % | DIASTOLIC BLOOD PRESSURE: 60 MMHG | HEIGHT: 60 IN | HEART RATE: 73 BPM

## 2020-12-09 DIAGNOSIS — N40.0 BENIGN PROSTATIC HYPERPLASIA WITHOUT LOWER URINARY TRACT SYMPTOMS: ICD-10-CM

## 2020-12-09 DIAGNOSIS — I25.810 ATHEROSCLEROSIS OF CORONARY ARTERY BYPASS GRAFT(S) WITHOUT ANGINA PECTORIS: ICD-10-CM

## 2020-12-09 DIAGNOSIS — J18.9 PNEUMONIA, UNSPECIFIED ORGANISM: ICD-10-CM

## 2020-12-09 DIAGNOSIS — Z95.1 PRESENCE OF AORTOCORONARY BYPASS GRAFT: Chronic | ICD-10-CM

## 2020-12-09 DIAGNOSIS — R07.9 CHEST PAIN, UNSPECIFIED: ICD-10-CM

## 2020-12-09 DIAGNOSIS — R06.02 SHORTNESS OF BREATH: ICD-10-CM

## 2020-12-09 DIAGNOSIS — R42 DIZZINESS AND GIDDINESS: ICD-10-CM

## 2020-12-09 LAB
ADD ON TEST-SPECIMEN IN LAB: SIGNIFICANT CHANGE UP
ALBUMIN SERPL ELPH-MCNC: 2.7 G/DL — LOW (ref 3.3–5)
ALP SERPL-CCNC: 74 U/L — SIGNIFICANT CHANGE UP (ref 40–120)
ALT FLD-CCNC: 27 U/L — SIGNIFICANT CHANGE UP (ref 4–41)
ANION GAP SERPL CALC-SCNC: 10 MMOL/L — SIGNIFICANT CHANGE UP (ref 7–14)
APTT BLD: 23.9 SEC — LOW (ref 27–36.3)
AST SERPL-CCNC: 20 U/L — SIGNIFICANT CHANGE UP (ref 4–40)
B PERT DNA SPEC QL NAA+PROBE: SIGNIFICANT CHANGE UP
BASOPHILS # BLD AUTO: 0.05 K/UL — SIGNIFICANT CHANGE UP (ref 0–0.2)
BASOPHILS NFR BLD AUTO: 0.7 % — SIGNIFICANT CHANGE UP (ref 0–2)
BILIRUB SERPL-MCNC: 0.3 MG/DL — SIGNIFICANT CHANGE UP (ref 0.2–1.2)
BLD GP AB SCN SERPL QL: NEGATIVE — SIGNIFICANT CHANGE UP
BUN SERPL-MCNC: 14 MG/DL — SIGNIFICANT CHANGE UP (ref 7–23)
C PNEUM DNA SPEC QL NAA+PROBE: SIGNIFICANT CHANGE UP
CALCIUM SERPL-MCNC: 8.2 MG/DL — LOW (ref 8.4–10.5)
CHLORIDE SERPL-SCNC: 106 MMOL/L — SIGNIFICANT CHANGE UP (ref 98–107)
CO2 SERPL-SCNC: 23 MMOL/L — SIGNIFICANT CHANGE UP (ref 22–31)
CREAT SERPL-MCNC: 1.2 MG/DL — SIGNIFICANT CHANGE UP (ref 0.5–1.3)
EOSINOPHIL # BLD AUTO: 0.05 K/UL — SIGNIFICANT CHANGE UP (ref 0–0.5)
EOSINOPHIL NFR BLD AUTO: 0.7 % — SIGNIFICANT CHANGE UP (ref 0–6)
FLUAV H1 2009 PAND RNA SPEC QL NAA+PROBE: SIGNIFICANT CHANGE UP
FLUAV H1 RNA SPEC QL NAA+PROBE: SIGNIFICANT CHANGE UP
FLUAV H3 RNA SPEC QL NAA+PROBE: SIGNIFICANT CHANGE UP
FLUAV SUBTYP SPEC NAA+PROBE: SIGNIFICANT CHANGE UP
FLUBV RNA SPEC QL NAA+PROBE: SIGNIFICANT CHANGE UP
GLUCOSE SERPL-MCNC: 95 MG/DL — SIGNIFICANT CHANGE UP (ref 70–99)
HADV DNA SPEC QL NAA+PROBE: SIGNIFICANT CHANGE UP
HCOV PNL SPEC NAA+PROBE: SIGNIFICANT CHANGE UP
HCT VFR BLD CALC: 34.4 % — LOW (ref 39–50)
HGB BLD-MCNC: 10.7 G/DL — LOW (ref 13–17)
HMPV RNA SPEC QL NAA+PROBE: SIGNIFICANT CHANGE UP
HPIV1 RNA SPEC QL NAA+PROBE: SIGNIFICANT CHANGE UP
HPIV2 RNA SPEC QL NAA+PROBE: SIGNIFICANT CHANGE UP
HPIV3 RNA SPEC QL NAA+PROBE: SIGNIFICANT CHANGE UP
HPIV4 RNA SPEC QL NAA+PROBE: SIGNIFICANT CHANGE UP
IANC: 4.76 K/UL — SIGNIFICANT CHANGE UP (ref 1.5–8.5)
IMM GRANULOCYTES NFR BLD AUTO: 2.7 % — HIGH (ref 0–1.5)
INR BLD: 1.03 RATIO — SIGNIFICANT CHANGE UP (ref 0.88–1.17)
LYMPHOCYTES # BLD AUTO: 1.56 K/UL — SIGNIFICANT CHANGE UP (ref 1–3.3)
LYMPHOCYTES # BLD AUTO: 21.1 % — SIGNIFICANT CHANGE UP (ref 13–44)
MAGNESIUM SERPL-MCNC: 1.9 MG/DL — SIGNIFICANT CHANGE UP (ref 1.6–2.6)
MCHC RBC-ENTMCNC: 31.1 GM/DL — LOW (ref 32–36)
MCHC RBC-ENTMCNC: 31.8 PG — SIGNIFICANT CHANGE UP (ref 27–34)
MCV RBC AUTO: 102.4 FL — HIGH (ref 80–100)
MONOCYTES # BLD AUTO: 0.76 K/UL — SIGNIFICANT CHANGE UP (ref 0–0.9)
MONOCYTES NFR BLD AUTO: 10.3 % — SIGNIFICANT CHANGE UP (ref 2–14)
NEUTROPHILS # BLD AUTO: 4.76 K/UL — SIGNIFICANT CHANGE UP (ref 1.8–7.4)
NEUTROPHILS NFR BLD AUTO: 64.5 % — SIGNIFICANT CHANGE UP (ref 43–77)
NRBC # BLD: 0 /100 WBCS — SIGNIFICANT CHANGE UP
NRBC # FLD: 0 K/UL — SIGNIFICANT CHANGE UP
PHOSPHATE SERPL-MCNC: 2.9 MG/DL — SIGNIFICANT CHANGE UP (ref 2.5–4.5)
PLATELET # BLD AUTO: 222 K/UL — SIGNIFICANT CHANGE UP (ref 150–400)
POTASSIUM SERPL-MCNC: 4 MMOL/L — SIGNIFICANT CHANGE UP (ref 3.5–5.3)
POTASSIUM SERPL-SCNC: 4 MMOL/L — SIGNIFICANT CHANGE UP (ref 3.5–5.3)
PROT SERPL-MCNC: 5 G/DL — LOW (ref 6–8.3)
PROTHROM AB SERPL-ACNC: 11.8 SEC — SIGNIFICANT CHANGE UP (ref 9.8–13.1)
RAPID RVP RESULT: SIGNIFICANT CHANGE UP
RBC # BLD: 3.36 M/UL — LOW (ref 4.2–5.8)
RBC # FLD: 16.4 % — HIGH (ref 10.3–14.5)
RH IG SCN BLD-IMP: POSITIVE — SIGNIFICANT CHANGE UP
RSV RNA SPEC QL NAA+PROBE: SIGNIFICANT CHANGE UP
RV+EV RNA SPEC QL NAA+PROBE: SIGNIFICANT CHANGE UP
SARS-COV-2 RNA SPEC QL NAA+PROBE: SIGNIFICANT CHANGE UP
SODIUM SERPL-SCNC: 139 MMOL/L — SIGNIFICANT CHANGE UP (ref 135–145)
TROPONIN T, HIGH SENSITIVITY RESULT: 36 NG/L — SIGNIFICANT CHANGE UP
TROPONIN T, HIGH SENSITIVITY RESULT: 41 NG/L — SIGNIFICANT CHANGE UP
WBC # BLD: 7.38 K/UL — SIGNIFICANT CHANGE UP (ref 3.8–10.5)
WBC # FLD AUTO: 7.38 K/UL — SIGNIFICANT CHANGE UP (ref 3.8–10.5)

## 2020-12-09 PROCEDURE — 99205 OFFICE O/P NEW HI 60 MIN: CPT | Mod: PD

## 2020-12-09 PROCEDURE — 99285 EMERGENCY DEPT VISIT HI MDM: CPT

## 2020-12-09 PROCEDURE — 99223 1ST HOSP IP/OBS HIGH 75: CPT

## 2020-12-09 PROCEDURE — 71045 X-RAY EXAM CHEST 1 VIEW: CPT | Mod: 26

## 2020-12-09 PROCEDURE — 71250 CT THORAX DX C-: CPT | Mod: 26

## 2020-12-09 PROCEDURE — 93010 ELECTROCARDIOGRAM REPORT: CPT

## 2020-12-09 RX ORDER — SODIUM CHLORIDE 9 MG/ML
1000 INJECTION, SOLUTION INTRAVENOUS ONCE
Refills: 0 | Status: COMPLETED | OUTPATIENT
Start: 2020-12-09 | End: 2020-12-09

## 2020-12-09 RX ORDER — ATORVASTATIN CALCIUM 80 MG/1
5 TABLET, FILM COATED ORAL AT BEDTIME
Refills: 0 | Status: DISCONTINUED | OUTPATIENT
Start: 2020-12-09 | End: 2020-12-12

## 2020-12-09 RX ORDER — FINASTERIDE 5 MG/1
5 TABLET, FILM COATED ORAL DAILY
Refills: 0 | Status: DISCONTINUED | OUTPATIENT
Start: 2020-12-09 | End: 2020-12-12

## 2020-12-09 RX ORDER — SODIUM CHLORIDE 9 MG/ML
1000 INJECTION, SOLUTION INTRAVENOUS
Refills: 0 | Status: DISCONTINUED | OUTPATIENT
Start: 2020-12-09 | End: 2020-12-12

## 2020-12-09 RX ORDER — BUDESONIDE, MICRONIZED 100 %
9 POWDER (GRAM) MISCELLANEOUS EVERY 24 HOURS
Refills: 0 | Status: DISCONTINUED | OUTPATIENT
Start: 2020-12-10 | End: 2020-12-11

## 2020-12-09 RX ORDER — HEPARIN SODIUM 5000 [USP'U]/ML
5000 INJECTION INTRAVENOUS; SUBCUTANEOUS EVERY 12 HOURS
Refills: 0 | Status: DISCONTINUED | OUTPATIENT
Start: 2020-12-09 | End: 2020-12-12

## 2020-12-09 RX ORDER — PANTOPRAZOLE SODIUM 20 MG/1
40 TABLET, DELAYED RELEASE ORAL
Refills: 0 | Status: DISCONTINUED | OUTPATIENT
Start: 2020-12-09 | End: 2020-12-12

## 2020-12-09 RX ORDER — ASPIRIN/CALCIUM CARB/MAGNESIUM 324 MG
81 TABLET ORAL DAILY
Refills: 0 | Status: DISCONTINUED | OUTPATIENT
Start: 2020-12-09 | End: 2020-12-12

## 2020-12-09 RX ORDER — ACETAMINOPHEN 500 MG
650 TABLET ORAL EVERY 6 HOURS
Refills: 0 | Status: DISCONTINUED | OUTPATIENT
Start: 2020-12-09 | End: 2020-12-12

## 2020-12-09 RX ADMIN — ATORVASTATIN CALCIUM 5 MILLIGRAM(S): 80 TABLET, FILM COATED ORAL at 22:53

## 2020-12-09 RX ADMIN — SODIUM CHLORIDE 333.33 MILLILITER(S): 9 INJECTION, SOLUTION INTRAVENOUS at 17:58

## 2020-12-09 RX ADMIN — SODIUM CHLORIDE 60 MILLILITER(S): 9 INJECTION, SOLUTION INTRAVENOUS at 20:59

## 2020-12-09 NOTE — CONSULT NOTE ADULT - PROBLEM SELECTOR RECOMMENDATION 9
-new exertional chest pain symptoms in patient with CAD history   -will evaluate further with nuclear stress test; last test in 2019 with no findings   -no ischemic EKG changes, troponins 41-->36

## 2020-12-09 NOTE — ED ADULT NURSE REASSESSMENT NOTE - NS ED NURSE REASSESS COMMENT FT1
Break coverage for Garza RN.  Pt awake, alert and oriented x 4 denies chest pain or SOB.   Respirations even and unlabored.   Pt reports feeling much better than on arrival.  awaiting tele bed inpatient.

## 2020-12-09 NOTE — ED ADULT TRIAGE NOTE - CHIEF COMPLAINT QUOTE
Pt co epigastric  chest pain x few days with walking . Pt reports feeling dizzy with sob. . Pt was treated for pneumonia a few weeks ago . Pt given 162mg asa. arrives with 20 g iv to left wrist.  Pt is afebriles in triage. HX of cabg years ago.

## 2020-12-09 NOTE — H&P ADULT - HISTORY OF PRESENT ILLNESS
89 year old male with PMHx of CAD s/p CABG x2 and stent x1, GERD, former smoker, BPH, HLD, coming into hospital for exertional chest pain and dizziness/lightheadedness. Patient says he was in his usual state of health until yesterday, where he said he felt lightheaded and dizzy. He says that didn't take any of his medications yesterday because of this feeling. He says today when he woke up, he felt dizzy as well, and when he was walking he felt a pain in his xiphoid process. Patient states he took his blood pressure at home and it was 80 systolic. He  said  he had a doctors appt today with the geriatrician who told him to come into hospital. He says these symptoms go away when he is resting or laying down. He is resting comfortably in his bed now in the ED and says he feels better just laying there. He denies any trauma or falling. He was recently hospitalized for CAP and completed a course of antibiotics. Patient denies fever/chills, abdominal pain, n/d, change in bowel movements, dysuria, change in urination, lower extremity edema, hemiparesis, change in gait, appetite changes, rashes.     In ED, Patient was found to have positive orthostatic BP - Lying down 150/69, Sitting 148/76, Standing 122/73.

## 2020-12-09 NOTE — CONSULT NOTE ADULT - ATTENDING COMMENTS
Patient seen and examined   Labs and vitals reviewed  Agree with above assessment and plan  89M w CAD s/p CABG/PCI (last pci 20 years ago), former smoker, HLD, p/w exertional chest pain and dizziness/lightheadedness.  EKG reviewed, TWI in v2 noted.   no active chest pain, pt comfortable appearing at present. mild elevation in trop noted  would check ck, ckmb  grossly euvolemic on exam  pt states he checked his bp at home this am and found it to be 80/60s. now systolic 120s.   would check orthostatics.  allscripts chart reviewed (primary cardiologist Dr Jay Lisker, [facility]) recent tte in 5/2020 showing mod MR with preserved LVEF.  given cad hx, will plan for nuclear stress in am  if chest pain returns, or enzymes cont to rise, and/or ekg changes overnight, will consider angiogram in am  monitor on tele.  cont asa, statin  will follow.

## 2020-12-09 NOTE — CONSULT NOTE ADULT - PROBLEM SELECTOR RECOMMENDATION 2
-could be related to polypharmacy or orthostatic hypotension   -check orthostatics, keep hydrated   -can hold off on patient's ranexa

## 2020-12-09 NOTE — H&P ADULT - PROBLEM SELECTOR PLAN 4
- Will hold Flomax and continue with Dutasteride for BPH  - Monitor for urinary retention with bladder scan - BP with good control off medication as outpatient  - Dash diet

## 2020-12-09 NOTE — ED ADULT NURSE NOTE - INTERVENTIONS DEFINITIONS
Call bell, personal items and telephone within reach/Non-slip footwear when patient is off stretcher/Monitor for mental status changes and reorient to person, place, and time/Review medications for side effects contributing to fall risk/Room bathroom lighting operational/Stretcher in lowest position, wheels locked, appropriate side rails in place/Monitor gait and stability/[place] to call system/Instruct patient to call for assistance/Physically safe environment: no spills, clutter or unnecessary equipment

## 2020-12-09 NOTE — CONSULT NOTE ADULT - SUBJECTIVE AND OBJECTIVE BOX
89 year old male with PMHx of CAD s/p CABG x2 and stent x1, GERD, former smoker, BPH, HLD, coming into hospital for exertional chest pain and dizziness/lightheadedness. Patient says he was in his usual state of health until yesterday, where he said he felt lightheaded and dizzy. He says that didn't take any of his medications yesterday because of this feeling. He says today when he woke up, he felt dizzy as well, and when he was walking he felt a pain in his xiphoid process. He says he had a doctors appt today with the geriatrician who told him to come into hospital. He says he also felt short of breath when he would walk to. He says these symptoms go away when he is resting or laying down. He is resting comfortably in his bed now in the ED and says he feels better just laying there. He denies any trauma or falling. He was recently hospitalized for CAP and completed a course of antibiotics.     He has a NST performed in 2019 and no recent cardiac testing since then.       PHYSICAL EXAM:  Vital Signs Last 24 Hrs  T(C): 37.2 (12-09-20 @ 15:41)  T(F): 98.9 (12-09-20 @ 15:41), Max: 98.9 (12-09-20 @ 15:41)  HR: 80 (12-09-20 @ 15:41) (80 - 94)  BP: 125/60 (12-09-20 @ 15:41)  BP(mean): --  RR: 18 (12-09-20 @ 15:41) (18 - 18)  SpO2: 100% (12-09-20 @ 15:41) (100% - 100%)  Wt(kg): --    Constitutional: NAD, awake and alert  EYES: EOMI  ENT:  Normal Hearing, no tonsillar exudates   Neck: Soft and supple , no thyromegaly   Respiratory: Breath sounds are clear bilaterally, No wheezing, rales or rhonchi or crackles   Cardiovascular: S1 and S2, regular rate and rhythm, no Murmurs, gallops or rubs, no JVD,    Gastrointestinal: Bowel Sounds present, soft, nontender, nondistended, no guarding, no rebound  Extremities: No cyanosis or clubbing; warm to touch  Vascular: 2+ peripheral pulses lower ex  Neurological: No focal deficits,   Musculoskeletal: moving all 4 extremities spontaneously   Skin: No rashes  Psych: no depression or anhedonia, AAOx3  HEME: no bruises, no nose bleeds      Personally reviewed imaging, labs, EKG  LABS:                        10.4   7.40  )-----------( 217      ( 09 Dec 2020 14:43 )             33.9     12-09    139  |  106  |  14  ----------------------------<  95  4.0   |  23  |  1.20    Ca    8.2<L>      09 Dec 2020 12:31  Phos  2.9     12-09  Mg     1.9     12-09    TPro  5.0<L>  /  Alb  2.7<L>  /  TBili  0.3  /  DBili  x   /  AST  20  /  ALT  27  /  AlkPhos  74  12-09    PT/INR - ( 09 Dec 2020 13:51 )   PT: 11.8 sec;   INR: 1.03 ratio         PTT - ( 09 Dec 2020 13:51 )  PTT:23.9 sec          RADIOLOGY & ADDITIONAL TESTS:    Imaging Personally Reviewed:    Consultant(s) Notes Reviewed:      Care Discussed with Consultants/Other Providers:   89 year old male with PMHx of CAD s/p CABG x2 and stent x1, GERD, former smoker, BPH, HLD, coming into hospital for exertional chest pain and dizziness/lightheadedness. Patient says he was in his usual state of health until yesterday, where he said he felt lightheaded and dizzy. He says that didn't take any of his medications yesterday because of this feeling. He says today when he woke up, he felt dizzy as well, and when he was walking he felt a pain in his xiphoid process. He says he had a doctors appt today with the geriatrician who told him to come into hospital. He says he also felt short of breath when he would walk to. He says these symptoms go away when he is resting or laying down. He is resting comfortably in his bed now in the ED and says he feels better just laying there. He denies any trauma or falling. He was recently hospitalized for CAP and completed a course of antibiotics.     He has a NST performed in 2019 and no recent cardiac testing since then. EF 70%. Follows with outpt Dr. Lisker for cardiology.       PHYSICAL EXAM:  Vital Signs Last 24 Hrs  T(C): 37.2 (12-09-20 @ 15:41)  T(F): 98.9 (12-09-20 @ 15:41), Max: 98.9 (12-09-20 @ 15:41)  HR: 80 (12-09-20 @ 15:41) (80 - 94)  BP: 125/60 (12-09-20 @ 15:41)  BP(mean): --  RR: 18 (12-09-20 @ 15:41) (18 - 18)  SpO2: 100% (12-09-20 @ 15:41) (100% - 100%)  Wt(kg): --    Constitutional: NAD, awake and alert  EYES: EOMI  ENT:  Normal Hearing, no tonsillar exudates   Neck: Soft and supple , no thyromegaly   Respiratory: Breath sounds are clear bilaterally, No wheezing, rales or rhonchi or crackles   Cardiovascular: S1 and S2, irregular rate although no murmurs auscultated   Gastrointestinal: Bowel Sounds present, soft, nontender, nondistended, no guarding, no rebound  Extremities: No cyanosis or clubbing; warm to touch  Vascular: 2+ peripheral pulses lower ex  Neurological: No focal deficits,   Musculoskeletal: moving all 4 extremities spontaneously   Skin: No rashes  Psych: no depression or anhedonia, AAOx3  HEME: no bruises, no nose bleeds      Personally reviewed imaging, labs, EKG  LABS:                        10.4   7.40  )-----------( 217      ( 09 Dec 2020 14:43 )             33.9     12-09    139  |  106  |  14  ----------------------------<  95  4.0   |  23  |  1.20    Ca    8.2<L>      09 Dec 2020 12:31  Phos  2.9     12-09  Mg     1.9     12-09    TPro  5.0<L>  /  Alb  2.7<L>  /  TBili  0.3  /  DBili  x   /  AST  20  /  ALT  27  /  AlkPhos  74  12-09    PT/INR - ( 09 Dec 2020 13:51 )   PT: 11.8 sec;   INR: 1.03 ratio         PTT - ( 09 Dec 2020 13:51 )  PTT:23.9 sec          RADIOLOGY & ADDITIONAL TESTS:    Stress test 2019:  GATED ANALYSIS:  Post-stress gated wall motion analysis was performed (LVEF  =70 %;LVEDV = 53 ml.), revealing paradoxical septal  motion due to post-op state with normal LV function.  ------------------------------------------------------------------------  IMPRESSIONS:Probably Normal Study  * Exercise capacity: 3 METS, Poor for age and gender.  * Chest Pain: No chest pain with exercise.  * Symptom: Fatigue.  * HR Response: Appropriate.  * BP Response: Appropriate.  * Heart Rhythm: Sinus Rhythm - 68 BPM.  * Conduction defects: 1 degree AV block.  * Baseline ECG: Nonspecific ST-T wave abnormality.  * ECG Changes: No signficant ECG abnormalities as compared  to baseline.  * Arrhythmia: Occasional VPDs occurred during rest, stress  and recovery.  * The left ventricle was normal in size. There is a medium  sized, mild defect in inferior wall that is fixed with  normal wall motion suggestive of diaphragmatic attenuation  artifact.  * Increased right ventricular tracer uptake is noted on  rest and stress imaging.  * TID calculated at 1.21 which is upper limit of normal  for this protocol.  * Post-stress gated wall motion analysis was performed  (LVEF = 70 %;LVEDV = 53 ml.), revealing paradoxical septal  motion due to post-op state with normal LV function.      EKG:   sinus rhythm

## 2020-12-09 NOTE — H&P ADULT - PROBLEM SELECTOR PLAN 1
- Patient with hx/o CAD, r/o Ischemic event:  - Serial EKGs and cardiac enzymes  - cards consult for NST vs cath c/s.  - No caffeine  - CXR, TTE, NST - ordered

## 2020-12-09 NOTE — ED ADULT NURSE NOTE - OBJECTIVE STATEMENT
Pt received Aox4, ambulatory with cane at baseline presents to the ED with chief complaint of MARINO and intermittent midsternal chest pain/ epigastric pain that has been ongoing for the past couple of days. Pt describes the pain as an "ache". Pt states he felt lightheaded today, felt like fainting. Pt denies falling. Pt states even with minimal exertion, the lightheadedness and MARINO came back. Pt was recently treated for PNA, finished his course of abx. Pt placed on cardiac monitor. Pt breathing even and unlabored while pt resting in bed, saturating 100% on RA. Pt also states he a chemical stress test done about 2 months ago, was not told of any abnormality from his provider. Pt denies NVD, dyruia, chills, fever, cough at the moment. PMHX: CAP s/p CABG x2, stent palcement, HLD.

## 2020-12-09 NOTE — H&P ADULT - PROBLEM SELECTOR PLAN 6
- Continue with Statin  - Dash diet - Continue with Budesonide tabs Q AM  - Continue with Cefdinir BID

## 2020-12-09 NOTE — ED PROVIDER NOTE - CLINICAL SUMMARY MEDICAL DECISION MAKING FREE TEXT BOX
Pt is a 89 y/o M former smoker PMHx CAD s/p CABG x 2, stent x 1, HLD p/w lightheadedness today. -- r/o ACS, possible pneumonia, low suspicion for PE or aortic dissection -- labs, trop, cxr, ct chest, telemetry, ekg

## 2020-12-09 NOTE — H&P ADULT - ATTENDING COMMENTS
89 year old male with PMHx of CAD s/p CABG x2 and stent x1, GERD, former smoker, BPH, HLD, recently dx crohns admitted for lightheadedness due to orthostatic hypotension and drug induced (ranexa and flomax) c/b MARINO r/o cardiac ischemia. Pt reports lightheadedness when going from supine to standing position, worsened with flomax BID therefore he self titrated to once daily due to sxs. He has hx of orthostatic hypotension episodes in past that felt similar. Pt reports poor po intake. He denies sxs of crohns flare, had normal, non bloody BM yesterday. Denies sxs of pna: no cough, fever, sputum production. OF note pt recently hospitalized in november for PNA and october for newly diagnosed crohns on colonoscopy.   On exam nonfocal, AAO x 3, CTABL, RRR, soft, non tender abdomen, +BS  - Hold ranexxa and flomax due to positive orthostatics and administer 1L LR   - repeat orthostatic after intervention, start compression stockings b/l  - cardiology rec nuclear stress test in AM to r/o cardiac ischemia, obtain TTE  - EKG TWI in V2 , trops mildly elevated, check CK and CKMB  - cont to monitor on telemetry  - of note TTE in 05/20 showed EF 65%, mod MR

## 2020-12-09 NOTE — H&P ADULT - PROBLEM SELECTOR PLAN 5
- BP with good control off medication as outpatient  - Dash diet - Continue with Statin  - Dash diet

## 2020-12-09 NOTE — H&P ADULT - ASSESSMENT
89 year old male with PMHx of CAD s/p CABG x2 and stent x1, GERD, former smoker, BPH, HLD, coming into hospital for exertional chest pain and dizziness/lightheadedness. Patient says he was in his usual state of health until yesterday, where he said he felt lightheaded and dizzy.    In ED, Patient was found to have positive orthostatic BP - Lying down 150/69, Sitting 148/76, Standing 122/73.

## 2020-12-09 NOTE — H&P ADULT - PROBLEM SELECTOR PLAN 2
- Patient with hx/o CAD, r/o Ischemic event:  - Serial EKGs and cardiac enzymes  - cards consult for NST vs cath c/s.  - No caffeine  - CXR, TTE, NST - ordered  - Continue with ASA 81 mg QD - Patient with positive orthostatic BP, will hold Ranexa as per cardiology.  - Will give LR gentle hydration for now.  - Will hold Flomax and continue with Dutasteride for BPH

## 2020-12-09 NOTE — ED PROVIDER NOTE - ATTENDING CONTRIBUTION TO CARE
Attending Statement: I have reviewed and agree with all pertinent clinical information, including history and physical exam and agree with treatment plan of the PA, except as noted.  89 y/o M former smoker PMHx CAD s/p CABG x 2, stent x 1, HLD recent pna a month ago pw exertional chest pain and SOB x few days. Endorse exertional SOB and MARINO, resolves at rest. Mid sternal, exertional, not pleuritic chest pain, now resolved. no fever/chills no cough no abdominal pain. no n/v/d no calf pain or leg swelling  Vital signs noted. mmm. sitting up no distress. normal S1-S2 rales of mid/lower lung, soft nontender abdomen. no  rebound. no guarding. no sign of trauma. no CVAT no pedal edema. no calf tenderness. normal pulses bilateral feet.  plan labs, ekg, cxr, tele monitor, tba

## 2020-12-09 NOTE — ED PROVIDER NOTE - PROGRESS NOTE DETAILS
MALLORY AGUIRRE:  Case discussed with cardiologist Dr. Jay Lisker who agrees with admission at this time.  He requests that house cardiology follow patient during admission. MALLORY AGUIRRE:  Pt accepted by Dr. Lux at this time.  MAR text paged at this time.

## 2020-12-09 NOTE — H&P ADULT - PROBLEM SELECTOR PLAN 3
- Patient with positive orthostatic BP, will hold Ranexa as per cardiology.  - Will give LR gentle hydration for now.  - Will hold Flomax and continue with Dutasteride for BPH - Will hold Flomax and continue with Dutasteride for BPH  - Monitor for urinary retention with bladder scan

## 2020-12-09 NOTE — ASSESSMENT
[FreeTextEntry1] : 89yoM with pmhx of cad s/p cabg, pci, HLD, BPH, anemia, Crohn's disease, recent hospitalization with RML PNA discharged end of Nov who presents in office today with cc of acute onset shortness of breath associated with mid-sternal chest pain.\par Patient transferred to ER via ambulance for concern for ACS vs PE vs PNA for further evaluation and  management.\par

## 2020-12-09 NOTE — ED PROVIDER NOTE - OBJECTIVE STATEMENT
Pt is a 89 y/o M former smoker PMHx CAD s/p CABG x 2, stent x 1, HLD p/w lightheadedness today.  Pt states for past few days, he has had intermittent substernal chest pain described as mild aching associated with MARINO, which would resolve with rest.  Pt states today, upon exertion, he began to experience more intense chest pain and MARINO associated with lightheadedness, which would resolve with rest, but recur on exertion. He states he took at total of 162 mg ASA today.  He denies any fevers, chills, nausea, vomiting, jaw/arm/neck/back/abdominal pain, diarrhea, melena, brbpr, calf pain/swelling, h/o dvt/pe, hemoptysis, h/o malignancy recent surgeries, recent prolonged immobilization, hormonal replacement therapy. Pt is a 89 y/o M former smoker PMHx CAD s/p CABG x 2, stent x 1, HLD p/w lightheadedness today.  Pt states for past few days, he has had intermittent substernal chest and epigastric pain described as mild aching associated with MARINO, which would resolve with rest.  Pt states today, upon exertion, he began to experience more intense chest pain and MARINO associated with lightheadedness, which would resolve with rest, but recur on exertion. He states he took at total of 162 mg ASA today.  He denies any fevers, chills, nausea, vomiting, jaw/arm/neck/back/other abdominal pain, diarrhea, melena, brbpr, calf pain/swelling, h/o dvt/pe, hemoptysis, h/o malignancy recent surgeries, recent prolonged immobilization, hormonal replacement therapy.

## 2020-12-09 NOTE — HISTORY OF PRESENT ILLNESS
[FreeTextEntry1] : 89yoM with pmhx of cad s/p cabg, pci, HLD, BPH, anemia, Crohn's disease, recent hospitalization with [facility] PNA discharged end of Nov who presents in office today as a new patient.\par \par patient lives alone, but has hha at [facility].  he took a taxi to the visit today.\par upon entering office, complaining of SOB and chest pain.  \par BP 90/60  HR \par \par Denies pleuritic chest pain, tenderness to palpation, or fever/coughing.\par \par states he has been eating/drinking normally.\par \par states due to dizziness/sob he did not take his medications today.\par \par EMS called-  aspirin administered, supplemental O2 provied sating 98%.\par \par hcp: dtrs Christine and Karla\par Full code\par

## 2020-12-09 NOTE — ED CLERICAL - NS ED CLERK NOTE PRE-ARRIVAL INFORMATION; ADDITIONAL PRE-ARRIVAL INFORMATION
This patient is enrolled in the Follow Your Heart program and has undergone a cardiac surgery procedure within the last 30 days and has active care navigation. This patient can be followed up by the care navigation team within 24 hours. To arrange close follow-up or to obtain additional clinical information about this patient, please call the contact number above. Please call the cardiac surgery team once patient is registered at [phone] for consultation PRIOR to disposition decision.  The patient recently underwent a cardiac surgery procedure and the team can assist in acute medical management.

## 2020-12-09 NOTE — PHYSICAL EXAM
[General Appearance - Alert] : alert [Sclera] : the sclera and conjunctiva were normal [Extraocular Movements] : extraocular movements were intact [No Oral Pallor] : no oral pallor [No Oral Cyanosis] : no oral cyanosis [Oropharynx] : The oropharynx was normal [Neck Appearance] : the appearance of the neck was normal [] : no respiratory distress [Respiration, Rhythm And Depth] : normal respiratory rhythm and effort [Exaggerated Use Of Accessory Muscles For Inspiration] : no accessory muscle use [Auscultation Breath Sounds / Voice Sounds] : lungs were clear to auscultation bilaterally [FreeTextEntry1] : irregular, tachycardic [Skin Color & Pigmentation] : normal skin color and pigmentation [Oriented To Time, Place, And Person] : oriented to person, place, and time [Affect] : the affect was normal [Mood] : the mood was normal

## 2020-12-10 DIAGNOSIS — I25.810 ATHEROSCLEROSIS OF CORONARY ARTERY BYPASS GRAFT(S) WITHOUT ANGINA PECTORIS: ICD-10-CM

## 2020-12-10 DIAGNOSIS — I95.1 ORTHOSTATIC HYPOTENSION: ICD-10-CM

## 2020-12-10 DIAGNOSIS — N40.0 BENIGN PROSTATIC HYPERPLASIA WITHOUT LOWER URINARY TRACT SYMPTOMS: ICD-10-CM

## 2020-12-10 DIAGNOSIS — K21.9 GASTRO-ESOPHAGEAL REFLUX DISEASE WITHOUT ESOPHAGITIS: ICD-10-CM

## 2020-12-10 LAB
ANION GAP SERPL CALC-SCNC: 12 MMOL/L — SIGNIFICANT CHANGE UP (ref 7–14)
BUN SERPL-MCNC: 12 MG/DL — SIGNIFICANT CHANGE UP (ref 7–23)
CALCIUM SERPL-MCNC: 7.8 MG/DL — LOW (ref 8.4–10.5)
CHLORIDE SERPL-SCNC: 104 MMOL/L — SIGNIFICANT CHANGE UP (ref 98–107)
CHOLEST SERPL-MCNC: 109 MG/DL — SIGNIFICANT CHANGE UP
CO2 SERPL-SCNC: 19 MMOL/L — LOW (ref 22–31)
CREAT SERPL-MCNC: 0.98 MG/DL — SIGNIFICANT CHANGE UP (ref 0.5–1.3)
GLUCOSE SERPL-MCNC: 91 MG/DL — SIGNIFICANT CHANGE UP (ref 70–99)
HCT VFR BLD CALC: 30.4 % — LOW (ref 39–50)
HDLC SERPL-MCNC: 42 MG/DL — SIGNIFICANT CHANGE UP
HGB BLD-MCNC: 9.8 G/DL — LOW (ref 13–17)
LIPID PNL WITH DIRECT LDL SERPL: 39 MG/DL — SIGNIFICANT CHANGE UP
MAGNESIUM SERPL-MCNC: 1.7 MG/DL — SIGNIFICANT CHANGE UP (ref 1.6–2.6)
MCHC RBC-ENTMCNC: 31.7 PG — SIGNIFICANT CHANGE UP (ref 27–34)
MCHC RBC-ENTMCNC: 32.2 GM/DL — SIGNIFICANT CHANGE UP (ref 32–36)
MCV RBC AUTO: 98.4 FL — SIGNIFICANT CHANGE UP (ref 80–100)
NON HDL CHOLESTEROL: 67 MG/DL — SIGNIFICANT CHANGE UP
NRBC # BLD: 0 /100 WBCS — SIGNIFICANT CHANGE UP
NRBC # FLD: 0 K/UL — SIGNIFICANT CHANGE UP
PLATELET # BLD AUTO: 170 K/UL — SIGNIFICANT CHANGE UP (ref 150–400)
POTASSIUM SERPL-MCNC: 3.9 MMOL/L — SIGNIFICANT CHANGE UP (ref 3.5–5.3)
POTASSIUM SERPL-SCNC: 3.9 MMOL/L — SIGNIFICANT CHANGE UP (ref 3.5–5.3)
RBC # BLD: 3.09 M/UL — LOW (ref 4.2–5.8)
RBC # FLD: 16.2 % — HIGH (ref 10.3–14.5)
SODIUM SERPL-SCNC: 135 MMOL/L — SIGNIFICANT CHANGE UP (ref 135–145)
TRIGL SERPL-MCNC: 141 MG/DL — SIGNIFICANT CHANGE UP
TROPONIN T, HIGH SENSITIVITY RESULT: 34 NG/L — SIGNIFICANT CHANGE UP
TSH SERPL-MCNC: 2.11 UIU/ML — SIGNIFICANT CHANGE UP (ref 0.27–4.2)
WBC # BLD: 5.93 K/UL — SIGNIFICANT CHANGE UP (ref 3.8–10.5)
WBC # FLD AUTO: 5.93 K/UL — SIGNIFICANT CHANGE UP (ref 3.8–10.5)

## 2020-12-10 PROCEDURE — 99233 SBSQ HOSP IP/OBS HIGH 50: CPT

## 2020-12-10 PROCEDURE — 93018 CV STRESS TEST I&R ONLY: CPT | Mod: GC

## 2020-12-10 PROCEDURE — 93306 TTE W/DOPPLER COMPLETE: CPT | Mod: 26

## 2020-12-10 PROCEDURE — 93016 CV STRESS TEST SUPVJ ONLY: CPT | Mod: GC

## 2020-12-10 PROCEDURE — 78452 HT MUSCLE IMAGE SPECT MULT: CPT | Mod: 26

## 2020-12-10 RX ORDER — SODIUM CHLORIDE 9 MG/ML
1000 INJECTION INTRAMUSCULAR; INTRAVENOUS; SUBCUTANEOUS
Refills: 0 | Status: COMPLETED | OUTPATIENT
Start: 2020-12-10 | End: 2020-12-10

## 2020-12-10 RX ORDER — SODIUM CHLORIDE 9 MG/ML
500 INJECTION INTRAMUSCULAR; INTRAVENOUS; SUBCUTANEOUS ONCE
Refills: 0 | Status: COMPLETED | OUTPATIENT
Start: 2020-12-10 | End: 2020-12-10

## 2020-12-10 RX ORDER — METOPROLOL TARTRATE 50 MG
25 TABLET ORAL DAILY
Refills: 0 | Status: DISCONTINUED | OUTPATIENT
Start: 2020-12-10 | End: 2020-12-12

## 2020-12-10 RX ADMIN — SODIUM CHLORIDE 80 MILLILITER(S): 9 INJECTION INTRAMUSCULAR; INTRAVENOUS; SUBCUTANEOUS at 21:51

## 2020-12-10 RX ADMIN — HEPARIN SODIUM 5000 UNIT(S): 5000 INJECTION INTRAVENOUS; SUBCUTANEOUS at 18:26

## 2020-12-10 RX ADMIN — Medication 9 MILLIGRAM(S): at 12:21

## 2020-12-10 RX ADMIN — PANTOPRAZOLE SODIUM 40 MILLIGRAM(S): 20 TABLET, DELAYED RELEASE ORAL at 05:57

## 2020-12-10 RX ADMIN — SODIUM CHLORIDE 500 MILLILITER(S): 9 INJECTION INTRAMUSCULAR; INTRAVENOUS; SUBCUTANEOUS at 20:35

## 2020-12-10 RX ADMIN — ATORVASTATIN CALCIUM 5 MILLIGRAM(S): 80 TABLET, FILM COATED ORAL at 21:47

## 2020-12-10 RX ADMIN — Medication 81 MILLIGRAM(S): at 12:21

## 2020-12-10 RX ADMIN — FINASTERIDE 5 MILLIGRAM(S): 5 TABLET, FILM COATED ORAL at 12:22

## 2020-12-10 RX ADMIN — HEPARIN SODIUM 5000 UNIT(S): 5000 INJECTION INTRAVENOUS; SUBCUTANEOUS at 05:57

## 2020-12-10 NOTE — PROGRESS NOTE ADULT - ASSESSMENT
89M hx of CAD s/p CABG/PCI with mild LV dysfunction p/w exertional symptoms.    #exertional dyspnea  -known CAD  -appears more related to chronic deconditioning  -EKG wnl, tele with APCs   -TTE w/ recovery of EF  -will check nuclear stress test  -start Toprol XL 25mg daily given APCs    #CAD  -c/w ASA/statin  -BB as above  -resume home Ranexa  -stress as above    Will follow up nuclear stress.  Given recovery of LV function, if stress test is wnl there will be no further cardiac workup and patient can follow up with outpatient cardiologist 2 weeks upon discharge.    Richie Matias MD  Cardiology Fellow - PGY 5  Text or Call: [phone]  For all New Consults and Questions:  [email or web address]   Login: [employer]

## 2020-12-10 NOTE — PROVIDER CONTACT NOTE (OTHER) - SITUATION
Mynor Gonzales. Ortho static was positive this evening, standing BP is 79/55. Complaining of dizziness.

## 2020-12-10 NOTE — PROVIDER CONTACT NOTE (OTHER) - RECOMMENDATIONS
Patient was safely transferred to the bed, bed alarm initiated. Patient educated with the need for bed alarm and to use call bell of needed. Patient is AOx4, verbalizes understanding.

## 2020-12-10 NOTE — CHART NOTE - NSCHARTNOTEFT_GEN_A_CORE
PA Note     Called by nurse for Positive Orthostatic     12-10-20 @ 20:00  Lying BP: 112/67 HR: 66   Sitting BP: 94/63 HR: 78  Standing BP: 79/55 HR: 60  Site: upper right arm   Mode: electronic    12-09-20 @ 20:21  Lying BP: 149/67 HR: 77   Sitting BP: 150/71 HR: 82  Standing BP: 132/65 HR: 95  Site: upper right arm   Mode: electronic    12-09-20 @ 17:18  Lying BP: 150/69 HR: 77   Sitting BP: 148/76 HR: 88  Standing BP: 122/73 HR: 87  Site: --   Mode: -- PA Note     Called by nurse for Positive Orthostatic   Pt felt dizzy upon standing    12-10-20 @ 20:00  Lying BP: 112/67 HR: 66   Sitting BP: 94/63 HR: 78  Standing BP: 79/55 HR: 60  Site: upper right arm   Mode: electronic    PHYSICAL EXAM:    Constitutional: Frail  HEENT: Normocephalic, atraumatic, PERRLA, EOMI,  Neck: Supple, No Lymphadenopathy, No JVD  Respiratory: Clear to Auscultation bilaterally,  No Wheezing /crackles /rhonchi  Cardiovascular: Positive  S1 and S2, Regular rate rhythm, No murmurs      This is a 89M CAD s/p CABG, stent, GERD, BPH p/w chest pain and dizziness found to have orthostatic hypotension.  Positive Orthostatic hypotension   - Will Bolus  cc X  - Will continue maintenance fluid NS @ 80 cc/hr X 1 Liter  - will Repeat Orthostatic BP in AM Post IVF  - Will continue to monitor

## 2020-12-10 NOTE — PROGRESS NOTE ADULT - SUBJECTIVE AND OBJECTIVE BOX
[facility] Division of Hospital Medicine  Ramo Kim MD  Pager (GLORIA-RENET[place], 8A-5P): [phone]  Other Times:  [phone]    Patient is a 89y old  Male who presents with a chief complaint of Chest pain (10 Dec 2020 13:55)    SUBJECTIVE / OVERNIGHT EVENTS:  Patient seen after stress test - patient unable to lie flat on his abdomen so concerned that quality of imaging might be limited.  C/o being cold because he only has gown and one blanket over him.   No F/C, N/V, CP, SOB, Cough, lightheadedness, dizziness, abdominal pain, diarrhea, dysuria.    MEDICATIONS  (STANDING):  aspirin enteric coated 81 milliGRAM(s) Oral daily  atorvastatin 5 milliGRAM(s) Oral at bedtime  buDESOnide    EC Capsule 9 milliGRAM(s) Oral every 24 hours  finasteride 5 milliGRAM(s) Oral daily  heparin   Injectable 5000 Unit(s) SubCutaneous every 12 hours  lactated ringers. 1000 milliLiter(s) (60 mL/Hr) IV Continuous <Continuous>  pantoprazole    Tablet 40 milliGRAM(s) Oral before breakfast    MEDICATIONS  (PRN):  acetaminophen   Tablet .. 650 milliGRAM(s) Oral every 6 hours PRN Temp greater or equal to 38C (100.4F), Mild Pain (1 - 3), Moderate Pain (4 - 6)      Vital Signs Last 24 Hrs  T(C): 36.7 (10 Dec 2020 12:20), Max: 37.2 (09 Dec 2020 15:41)  T(F): 98 (10 Dec 2020 12:20), Max: 98.9 (09 Dec 2020 15:41)  HR: 66 (10 Dec 2020 12:20) (66 - 89)  BP: 139/69 (10 Dec 2020 12:20) (123/66 - 139/69)  BP(mean): --  RR: 16 (10 Dec 2020 12:20) (15 - 18)  SpO2: 99% (10 Dec 2020 12:20) (97% - 100%)  CAPILLARY BLOOD GLUCOSE        I&O's Summary    09 Dec 2020 07:01  -  10 Dec 2020 07:00  --------------------------------------------------------  IN: 690 mL / OUT: 975 mL / NET: -285 mL        PHYSICAL EXAM:  GENERAL: Frail  HEAD:  Atraumatic, Normocephalic  EYES: EOMI, PERRLA, conjunctiva and sclera clear  NECK: Supple, No JVD  CHEST/LUNG: Clear to auscultation bilaterally; No wheeze  HEART: Regular rate and rhythm; No murmurs, rubs, or gallops  ABDOMEN: Soft, Nontender, Nondistended; Bowel sounds present  EXTREMITIES:  2+ Peripheral Pulses, No clubbing, cyanosis, or edema  PSYCH: Calm  NEUROLOGY: A/Ox3, non-focal  SKIN: No rashes or lesions    LABS:                        9.8    5.93  )-----------( 170      ( 10 Dec 2020 05:48 )             30.4     12-10    135  |  104  |  12  ----------------------------<  91  3.9   |  19<L>  |  0.98    Ca    7.8<L>      10 Dec 2020 05:48  Phos  2.9     12-09  Mg     1.7     12-10    TPro  5.0<L>  /  Alb  2.7<L>  /  TBili  0.3  /  DBili  x   /  AST  20  /  ALT  27  /  AlkPhos  74  12-09    PT/INR - ( 09 Dec 2020 13:51 )   PT: 11.8 sec;   INR: 1.03 ratio         PTT - ( 09 Dec 2020 13:51 )  PTT:23.9 sec          RADIOLOGY & ADDITIONAL TESTS:  < from: Transthoracic Echocardiogram (12.10.20 @ 09:39) >  ------------------------------------------------------------------------  CONCLUSIONS:  1. Mitral annular calcification, otherwise normal mitral  valve. Mild mitral regurgitation.  2. Calcified trileaflet aortic valve with normal opening.  Mild aortic regurgitation.  3. Normal left ventricular internal dimensions and wall  thicknesses.  4. Endocardium not well visualized; grossly normal left  ventricularsystolic function.  5. The right ventricle is not well visualized; grossly  normal right ventricular systolic function.  ------------------------------------------------------------------------  Confirmed on  12/10/2020 - 11:38:23 by Mark Lau M.D.  ------------------------------------------------------------------------    < end of copied text >    Imaging Personally Reviewed:    Care Discussed with Consultants/Other Providers:

## 2020-12-10 NOTE — PROVIDER CONTACT NOTE (OTHER) - BACKGROUND
Patient is admitted for chest pain. History of CAD, mild LV dysfunction, Angina of effort, HLD, GERD.

## 2020-12-10 NOTE — PROGRESS NOTE ADULT - SUBJECTIVE AND OBJECTIVE BOX
Patient seen and examined at bedside.    Overnight Events:  no events overnight.   ambulating in hallway without issue      REVIEW OF SYSTEMS:  Constitutional:     [x ] negative [ ] fevers [ ] chills [ ] weight loss [ ] weight gain  HEENT:                  [x ] negative [ ] dry eyes [ ] eye irritation [ ] postnasal drip [ ] nasal congestion  CV:                         [ x] negative  [ ] chest pain [ ] orthopnea [ ] palpitations [ ] murmur  Resp:                     [ x] negative [ ] cough [ ] shortness of breath [ ] dyspnea [ ] wheezing [ ] sputum [ ]hemoptysis  GI:                          [ x] negative [ ] nausea [ ] vomiting [ ] diarrhea [ ] constipation [ ] abd pain [ ] dysphagia   :                        [ x] negative [ ] dysuria [ ] nocturia [ ] hematuria [ ] increased urinary frequency  Musculoskeletal: [ x] negative [ ] back pain [ ] myalgias [ ] arthralgias [ ] fracture  Skin:                       [ x] negative [ ] rash [ ] itch  Neurological:        [x ] negative [ ] headache [ ] dizziness [ ] syncope [ ] weakness [ ] numbness  Psychiatric:           [ x] negative [ ] anxiety [ ] depression  Endocrine:            [ x] negative [ ] diabetes [ ] thyroid problem  Heme/Lymph:      [ x] negative [ ] anemia [ ] bleeding problem  Allergic/Immune: [ x] negative [ ] itchy eyes [ ] nasal discharge [ ] hives [ ] angioedema    [ x] All other systems negative  [ ] Unable to assess ROS due to    Current Meds:  acetaminophen   Tablet .. 650 milliGRAM(s) Oral every 6 hours PRN  aspirin enteric coated 81 milliGRAM(s) Oral daily  atorvastatin 5 milliGRAM(s) Oral at bedtime  buDESOnide    EC Capsule 9 milliGRAM(s) Oral every 24 hours  finasteride 5 milliGRAM(s) Oral daily  heparin   Injectable 5000 Unit(s) SubCutaneous every 12 hours  lactated ringers. 1000 milliLiter(s) IV Continuous <Continuous>  pantoprazole    Tablet 40 milliGRAM(s) Oral before breakfast      PAST MEDICAL & SURGICAL HISTORY:  Gastric motility disorder    Small bowel obstruction due to adhesions    Angina of effort    Hyperlipidemia, unspecified hyperlipidemia type    GERD (gastroesophageal reflux disease)    Hx of benign prostatic hypertrophy    Essential hypertension, benign    CAD (coronary artery disease) of bypass graft    CAD S/P percutaneous coronary angioplasty    S/P CABG x 2    S/P small bowel resection  with subsequent lysis of adhesisions        Vitals:  T(F): 98 (12-10), Max: 98.9 ()  HR: 66 (12-10) (66 - 89)  BP: 139/69 (12-10) (123/66 - 139/69)  RR: 16 (-10)  SpO2: 99% (12-10)  I&O's Summary    09 Dec 2020 07:01  -  10 Dec 2020 07:00  --------------------------------------------------------  IN: 690 mL / OUT: 975 mL / NET: -285 mL        Physical Exam:  Appearance: No acute distress; well appearing  Eyes: PERRL, EOMI, pink conjunctiva  HENT: Normal oral mucosa  Cardiovascular: RRR, S1, S2, no murmurs, rubs, or gallops; no edema; no JVD  Respiratory: Clear to auscultation bilaterally  Gastrointestinal: soft, non-tender, non-distended with normal bowel sounds  Musculoskeletal: No clubbing; no joint deformity   Neurologic: Non-focal  Lymphatic: No lymphadenopathy  Psychiatry: AAOx3, mood & affect appropriate  Skin: No rashes, ecchymoses, or cyanosis                              9.8    5.93  )-----------( 170      ( 10 Dec 2020 05:48 )             30.4     12-10    135  |  104  |  12  ----------------------------<  91  3.9   |  19<L>  |  0.98    Ca    7.8<L>      10 Dec 2020 05:48  Phos  2.9     -  Mg     1.7     12-10    TPro  5.0<L>  /  Alb  2.7<L>  /  TBili  0.3  /  DBili  x   /  AST  20  /  ALT  27  /  AlkPhos  74  12-09    PT/INR - ( 09 Dec 2020 13:51 )   PT: 11.8 sec;   INR: 1.03 ratio         PTT - ( 09 Dec 2020 13:51 )  PTT:23.9 sec        Total Cholesterol: 109  LDL: --  HDL: 42  T          Interpretation of Telemetry: multifocal APCs, NSR    Echo:  12/10/20  OBSERVATIONS:  Mitral Valve: Mitral annular calcification, otherwise  normal mitral valve. Mild mitral regurgitation.  Aortic Root: Normal aortic root.  Aortic Valve: Calcified trileaflet aortic valve with normal  opening. Mild aortic regurgitation.  Left Atrium: Normal left atrium.  Left Ventricle: Endocardium not well visualized; grossly  normal left ventricular systolic function. Normal left  ventricular internal dimensions and wall thicknesses.  Normal left ventricular diastolic function.  Right Heart: Normal right atrium. The right ventricle is  not well visualized; grossly normal right ventricular  systolic function. Normal tricuspid valve.  Minimal  tricuspid regurgitation. Normal pulmonic valve.  Mild  pulmonic regurgitation.  Pericardium/PleuraNormal pericardium with no pericardial  effusion.

## 2020-12-10 NOTE — PROVIDER CONTACT NOTE (OTHER) - ASSESSMENT
Patient was assessed for orthostatic hypotention, lying down /67 HR 66, sitting BP 94/63 HR 78, standing BP 79/55 HR 60.

## 2020-12-10 NOTE — PROVIDER CONTACT NOTE (OTHER) - ACTION/TREATMENT ORDERED:
Patient was safely transferred to the bed, bed alarm initiated. Patient educated with the need for bed alarm and to use call bell of needed. Patient is AOx4, verbalizes understanding. Bed alarm initiated with patient education to utilize call bell. NS 500ml bolus ordered and continuous NS infusion of 75ml/hr after that. Bed alarm initiated with patient education to utilize call bell. NS 500ml bolus ordered and continuous NS infusion of 75ml/hr after the initial bolus.

## 2020-12-11 ENCOUNTER — TRANSCRIPTION ENCOUNTER (OUTPATIENT)
Age: 85
End: 2020-12-11

## 2020-12-11 LAB
ANION GAP SERPL CALC-SCNC: 12 MMOL/L — SIGNIFICANT CHANGE UP (ref 7–14)
BUN SERPL-MCNC: 16 MG/DL — SIGNIFICANT CHANGE UP (ref 7–23)
CALCIUM SERPL-MCNC: 8 MG/DL — LOW (ref 8.4–10.5)
CHLORIDE SERPL-SCNC: 109 MMOL/L — HIGH (ref 98–107)
CO2 SERPL-SCNC: 20 MMOL/L — LOW (ref 22–31)
CREAT SERPL-MCNC: 0.98 MG/DL — SIGNIFICANT CHANGE UP (ref 0.5–1.3)
GLUCOSE SERPL-MCNC: 101 MG/DL — HIGH (ref 70–99)
HCT VFR BLD CALC: 31.4 % — LOW (ref 39–50)
HGB BLD-MCNC: 10 G/DL — LOW (ref 13–17)
MCHC RBC-ENTMCNC: 31.8 GM/DL — LOW (ref 32–36)
MCHC RBC-ENTMCNC: 32.1 PG — SIGNIFICANT CHANGE UP (ref 27–34)
MCV RBC AUTO: 100.6 FL — HIGH (ref 80–100)
NRBC # BLD: 0 /100 WBCS — SIGNIFICANT CHANGE UP
NRBC # FLD: 0 K/UL — SIGNIFICANT CHANGE UP
PLATELET # BLD AUTO: 170 K/UL — SIGNIFICANT CHANGE UP (ref 150–400)
POTASSIUM SERPL-MCNC: 4.4 MMOL/L — SIGNIFICANT CHANGE UP (ref 3.5–5.3)
POTASSIUM SERPL-SCNC: 4.4 MMOL/L — SIGNIFICANT CHANGE UP (ref 3.5–5.3)
RBC # BLD: 3.12 M/UL — LOW (ref 4.2–5.8)
RBC # FLD: 16.1 % — HIGH (ref 10.3–14.5)
SODIUM SERPL-SCNC: 141 MMOL/L — SIGNIFICANT CHANGE UP (ref 135–145)
WBC # BLD: 5.84 K/UL — SIGNIFICANT CHANGE UP (ref 3.8–10.5)
WBC # FLD AUTO: 5.84 K/UL — SIGNIFICANT CHANGE UP (ref 3.8–10.5)

## 2020-12-11 PROCEDURE — 99233 SBSQ HOSP IP/OBS HIGH 50: CPT

## 2020-12-11 RX ORDER — BUDESONIDE, MICRONIZED 100 %
1 POWDER (GRAM) MISCELLANEOUS
Qty: 0 | Refills: 0 | DISCHARGE

## 2020-12-11 RX ORDER — FINASTERIDE 5 MG/1
1 TABLET, FILM COATED ORAL
Qty: 0 | Refills: 0 | DISCHARGE

## 2020-12-11 RX ADMIN — Medication 9 MILLIGRAM(S): at 11:58

## 2020-12-11 RX ADMIN — Medication 81 MILLIGRAM(S): at 11:58

## 2020-12-11 RX ADMIN — HEPARIN SODIUM 5000 UNIT(S): 5000 INJECTION INTRAVENOUS; SUBCUTANEOUS at 17:06

## 2020-12-11 RX ADMIN — ATORVASTATIN CALCIUM 5 MILLIGRAM(S): 80 TABLET, FILM COATED ORAL at 21:40

## 2020-12-11 RX ADMIN — PANTOPRAZOLE SODIUM 40 MILLIGRAM(S): 20 TABLET, DELAYED RELEASE ORAL at 06:02

## 2020-12-11 RX ADMIN — FINASTERIDE 5 MILLIGRAM(S): 5 TABLET, FILM COATED ORAL at 11:58

## 2020-12-11 RX ADMIN — Medication 25 MILLIGRAM(S): at 06:02

## 2020-12-11 RX ADMIN — HEPARIN SODIUM 5000 UNIT(S): 5000 INJECTION INTRAVENOUS; SUBCUTANEOUS at 06:02

## 2020-12-11 NOTE — DISCHARGE NOTE PROVIDER - HOSPITAL COURSE
89M PMHx CAD s/p CABG and stent, GERD, BPH p/w chest pain with orthostatic hypotension.    Hospital course:  Pt admitted for chest pain. Troponins 41-->36. TTE WNL. Stress test: normal study. Cards following and stated pt appears more related to chronic deconditioning. Follow up with outpatient cardiologist 2 weeks upon discharge. Pt also c/o dizziness. Orthostatics were positive. Pt was given IVF and orthostatics improved. Pt will c/w compression stockings. Flomax and Ranexa are on hold. Metoprolol was resumed for APCs.    Discussed with Dr. Deshpande on 12/11, pt medically stable for discharge home. 89M PMHx CAD s/p CABG and stent, GERD, BPH p/w chest pain with orthostatic hypotension.    Hospital course:  Pt admitted for chest pain. Troponins 41-->36. TTE WNL. Stress test: normal study. Cards following and stated pt appears more related to chronic deconditioning. Follow up with outpatient cardiologist 2 weeks upon discharge. Pt also c/o dizziness. Orthostatics were positive. Pt was given IVF and orthostatics improved. Pt will c/w compression stockings. Flomax and Ranexa are on hold. Metoprolol was resumed for APCs.    Discussed with Dr. Deshpande on , pt medically stable for discharge home. 89M PMHx CAD s/p CABG and stent, GERD, BPH p/w chest pain with orthostatic hypotension.    Hospital course:  Pt admitted for chest pain. Troponins 41-->36. TTE WNL. Stress test: normal study. Cards following and stated pt appears more related to chronic deconditioning. Follow up with outpatient cardiologist 2 weeks upon discharge. Pt also c/o dizziness. Orthostatics were positive. Pt was given IVF and orthostatics improved. Pt will c/w compression stockings. Flomax, Norvasc, and Ranexa are on hold. Metoprolol was started for APCs. Pt course c/b abdominal pain. AXR nonobstructive. Pain improved. Pt will c/w Protonix.    Discussed with Dr. Deshpande on 12/12, pt medically stable for discharge home.

## 2020-12-11 NOTE — PROGRESS NOTE ADULT - SUBJECTIVE AND OBJECTIVE BOX
OVERNIGHT EVENTS: Orthostatic - received 1L NS bolus.     SUBJECTIVE / INTERVAL HPI: Patient seen and examined at bedside. Denies dizziness/lightheadness. Symptoms improved from prior. Denies chest pain. Requesting to go home.     VITAL SIGNS:  Vital Signs Last 24 Hrs  T(C): 36.7 (11 Dec 2020 10:29), Max: 37.4 (10 Dec 2020 18:25)  T(F): 98 (11 Dec 2020 10:29), Max: 99.3 (10 Dec 2020 18:25)  HR: 88 (11 Dec 2020 10:29) (70 - 88)  BP: 142/72 (11 Dec 2020 10:29) (108/54 - 142/72)  BP(mean): --  RR: 18 (11 Dec 2020 10:29) (17 - 18)  SpO2: 98% (11 Dec 2020 10:29) (98% - 99%)    PHYSICAL EXAM:    General: WDWN  HEENT: NC/AT; PERRL, anicteric sclera; MMM  Neck: supple  Cardiovascular: +S1/S2; RRR  Respiratory: CTA B/L; no W/R/R  Gastrointestinal: soft, NT/ND; +BSx4  Extremities: WWP; no edema, clubbing or cyanosis  Vascular: 2+ radial, DP/PT pulses B/L  Neurological: AAOx3; no focal deficits    MEDICATIONS:  MEDICATIONS  (STANDING):  aspirin enteric coated 81 milliGRAM(s) Oral daily  atorvastatin 5 milliGRAM(s) Oral at bedtime  buDESOnide    EC Capsule 9 milliGRAM(s) Oral every 24 hours  finasteride 5 milliGRAM(s) Oral daily  heparin   Injectable 5000 Unit(s) SubCutaneous every 12 hours  lactated ringers. 1000 milliLiter(s) (60 mL/Hr) IV Continuous <Continuous>  metoprolol succinate ER 25 milliGRAM(s) Oral daily  pantoprazole    Tablet 40 milliGRAM(s) Oral before breakfast    MEDICATIONS  (PRN):  acetaminophen   Tablet .. 650 milliGRAM(s) Oral every 6 hours PRN Temp greater or equal to 38C (100.4F), Mild Pain (1 - 3), Moderate Pain (4 - 6)      ALLERGIES:  Allergies    Cipro (Rash)  penicillins (Unknown)    Intolerances        LABS:                        10.0   5.84  )-----------( 170      ( 11 Dec 2020 08:05 )             31.4     12-11    141  |  109<H>  |  16  ----------------------------<  101<H>  4.4   |  20<L>  |  0.98    Ca    8.0<L>      11 Dec 2020 08:05  Mg     1.7     12-10      PT/INR - ( 09 Dec 2020 13:51 )   PT: 11.8 sec;   INR: 1.03 ratio         PTT - ( 09 Dec 2020 13:51 )  PTT:23.9 sec    CAPILLARY BLOOD GLUCOSE          RADIOLOGY & ADDITIONAL TESTS: Reviewed.    ASSESSMENT:    PLAN:

## 2020-12-11 NOTE — DISCHARGE NOTE PROVIDER - NSDCFUADDAPPT_GEN_ALL_CORE_FT
Please follow up with outpatient cardiologist 2 weeks upon discharge.  Please follow up with your PCP. Please follow up with outpatient cardiologist on 12/14 at 1pm as scheduled.  Please follow up with your PCP.  Please follow up with your Gastroenterologist Dr. Milan 1/5 at 10:15 AM as scheduled.

## 2020-12-11 NOTE — PROGRESS NOTE ADULT - ATTENDING COMMENTS
Personally saw and examined patient  Labs and vitals reviewed  Agree with above assessment and plan  CP resolved, TTE with grossly normal LV function  nuc stress pending
Dispo pending PT eval. Discussed plan with ACP team

## 2020-12-11 NOTE — DISCHARGE NOTE PROVIDER - CARE PROVIDER_API CALL
Lisker, Jay J  CARDIOVASCULAR DISEASE  [address] [facility] [facility] [address]  [place] [address]  Phone: [phone]  Fax: [phone]  Follow Up Time:    Lisker, Jay J  CARDIOVASCULAR DISEASE  [address] [facility] [facility] [address]  [place] [address]  Phone: [phone]  Fax: [phone]  Scheduled Appointment: 12/14/2020 01:00 PM    Colby Milan (MD)  Gastroenterology; Internal Medicine  [address] [facility]  [place] [address]  Phone: [phone]  Fax: [phone]  Follow Up Time:

## 2020-12-11 NOTE — DISCHARGE NOTE PROVIDER - NSDCMRMEDTOKEN_GEN_ALL_CORE_FT
acetaminophen 325 mg oral tablet: 2 tab(s) orally every 6 hours, As needed, Mild Pain (1 - 3)  Aspirin Enteric Coated 81 mg oral delayed release tablet: 1 tab(s) orally once a day  atorvastatin 10 mg oral tablet: 1 tab(s) orally once a day  budesonide 9 mg oral tablet, extended release: 1 tab(s) orally once a day (in the morning)  cefdinir 300 mg oral capsule: 1 cap(s) orally 2 times a day   Flomax 0.4 mg oral capsule: orally 2 times a day  ocular lubricant ophthalmic solution: 1 drop(s) to each affected eye once a day  Protonix 40 mg oral delayed release tablet: 1 tab(s) orally once a day  Ranexa 500 mg oral tablet, extended release: 1 tab(s) orally 2 times a day  Zithromax 500 mg oral tablet: 1 tab(s) orally once a day    acetaminophen 325 mg oral tablet: 2 tab(s) orally every 6 hours, As needed, Mild Pain (1 - 3)  Aspirin Enteric Coated 81 mg oral delayed release tablet: 1 tab(s) orally once a day  atorvastatin 10 mg oral tablet: 1 tab(s) orally once a day  budesonide 9 mg oral tablet, extended release: 1 tab(s) orally once a day (in the morning)  Flomax 0.4 mg oral capsule: orally 2 times a day  Norvasc 5 mg oral tablet: 1 tab(s) orally once a day  ocular lubricant ophthalmic solution: 1 drop(s) to each affected eye once a day  Proscar 5 mg oral tablet: 1 tab(s) orally once a day  Protonix 40 mg oral delayed release tablet: 1 tab(s) orally once a day  Ranexa 500 mg oral tablet, extended release: 1 tab(s) orally 2 times a day   Aspirin Enteric Coated 81 mg oral delayed release tablet: 1 tab(s) orally once a day  atorvastatin 10 mg oral tablet: 1 tab(s) orally once a day  budesonide 9 mg oral tablet, extended release: 1 tab(s) orally once a day (in the morning)  metoprolol succinate 25 mg oral tablet, extended release: 1 tab(s) orally once a day  ocular lubricant ophthalmic solution: 1 drop(s) to each affected eye once a day  Proscar 5 mg oral tablet: 1 tab(s) orally once a day  Protonix 40 mg oral delayed release tablet: 1 tab(s) orally once a day

## 2020-12-11 NOTE — PROVIDER CONTACT NOTE (OTHER) - ASSESSMENT
Aox4, refused to wear compression stockings despite education, patient denies Dizziness, orthostatic done in AM negative.

## 2020-12-11 NOTE — DISCHARGE NOTE PROVIDER - PROVIDER TOKENS
PROVIDER:[TOKEN:[[id number]]] PROVIDER:[TOKEN:[[id number]],SCHEDULEDAPPT:[12/14/2020],SCHEDULEDAPPTTIME:[01:00 PM]],PROVIDER:[TOKEN:[[id number]]]

## 2020-12-11 NOTE — DISCHARGE NOTE PROVIDER - NSDCHC_MEDRECSTATUS_GEN_ALL_CORE
Admission Reconciliation is Completed  Discharge Reconciliation is Not Complete Admission Reconciliation is Completed  Discharge Reconciliation is Completed

## 2020-12-11 NOTE — PHYSICAL THERAPY INITIAL EVALUATION ADULT - ADDITIONAL COMMENTS
Pt lives in [facility] alone (wife passed away). pt utilizes cane for ambulation, owns walker (from wife) if needed. Pt was independent in functional activities prior to admission without use of assistive device. Has housekeeping services 5x/week to assist with household chores.     Pt left semi-valero in bed, NAD. +call bell. +tele monitor. RN aware of session.

## 2020-12-11 NOTE — DISCHARGE NOTE PROVIDER - NSDCCPCAREPLAN_GEN_ALL_CORE_FT
PRINCIPAL DISCHARGE DIAGNOSIS  Diagnosis: Exertional chest pain  Assessment and Plan of Treatment: You were admitted for chest pain. Your cardiac enzymes were negative. You had an echo and stress test done that were both normal. Cardiology was consulted and stated your chest pain is likely more related to chronic deconditioning. Please follow up with outpatient cardiologist 2 weeks upon discharge.      SECONDARY DISCHARGE DIAGNOSES  Diagnosis: Abdominal pain  Assessment and Plan of Treatment: You were complaining of abdominal pain. You had an abdominal xray done that was normal. You did not have small bowel obstruction. Your pain resolved. Please continue Protonix daily.    Diagnosis: Lightheadedness  Assessment and Plan of Treatment: You were complaining of dizziness and lightheadedness. Your Orthostatics were positive meaning your blood pressure dropped when you went from lying to standing and your heart rate increased. You were given IV fluids and compression stockings and your orthostatics improved. Your Flomax, Norvasc, and Ranexa were discontinued since these medications can worsen orthostatics. Metoprolol was started for atrial premature complexes. Please take this everyday. Please follow up with your PCP in 1 week.     PRINCIPAL DISCHARGE DIAGNOSIS  Diagnosis: Exertional chest pain  Assessment and Plan of Treatment: You were admitted for chest pain. Your cardiac enzymes were negative. You had an echo and stress test done that were both normal. Cardiology was consulted and stated your chest pain is likely more related to chronic deconditioning. Please follow up with outpatient cardiologist on 12/14 at 1pm as scheduled.      SECONDARY DISCHARGE DIAGNOSES  Diagnosis: Abdominal pain  Assessment and Plan of Treatment: You were complaining of abdominal pain. You had an abdominal xray done that was normal. You did not have small bowel obstruction. Your pain resolved. Please continue Protonix daily. Please follow up with your Gastroenterologist Dr. Milan 1/5 at 10:15 AM as scheduled.    Diagnosis: Lightheadedness  Assessment and Plan of Treatment: You were complaining of dizziness and lightheadedness. Your Orthostatics were positive meaning your blood pressure dropped when you went from lying to standing and your heart rate increased. You were given IV fluids and compression stockings and your orthostatics improved. Your Flomax, Norvasc, and Ranexa were discontinued since these medications can worsen orthostatics. Metoprolol was started for atrial premature complexes. Please take this everyday. Please follow up with your PCP in 1 week.     PRINCIPAL DISCHARGE DIAGNOSIS  Diagnosis: Exertional chest pain  Assessment and Plan of Treatment: You were admitted for chest pain. Your cardiac enzymes were negative. You had an echo and stress test done that were both normal. Cardiology was consulted and stated your chest pain is likely more related to chronic deconditioning. Please follow up with outpatient cardiologist on 12/14 at 1pm as scheduled.      SECONDARY DISCHARGE DIAGNOSES  Diagnosis: Abdominal pain  Assessment and Plan of Treatment: You were complaining of abdominal pain. You had an abdominal xray done that was normal. You did not have small bowel obstruction. Your pain resolved. Please continue Protonix daily. Please continue to take your Budesonide daily. Please follow up with your Gastroenterologist Dr. Milan 1/5 at 10:15 AM as scheduled.    Diagnosis: Lightheadedness  Assessment and Plan of Treatment: You were complaining of dizziness and lightheadedness. Your Orthostatics were positive meaning your blood pressure dropped when you went from lying to standing and your heart rate increased. You were given IV fluids and compression stockings and your orthostatics improved. Your Flomax, Norvasc, and Ranexa were discontinued since these medications can worsen orthostatics. Metoprolol was started for atrial premature complexes. Please take this everyday. Please follow up with your PCP in 1 week.

## 2020-12-11 NOTE — PROGRESS NOTE ADULT - PROBLEM SELECTOR PLAN 1
Reporting dizziness at home. On multiple blood pressure medications. Orthostatic positive. Now orthostatics improved after holding BP meds and fluid resuscitation. Systolic lying --> standing wnl. Diastolic lying --> standing considered to be orthostatic still.   - On norvasc 5, ranexa, lasix?  - Holding BP meds   - Starting toprol in the setting of frequent APCs, tolerating well. BP stable but still mildly orthostatic. Encourage PO intake and recheck prior to d/c

## 2020-12-11 NOTE — PHYSICAL THERAPY INITIAL EVALUATION ADULT - GENERAL OBSERVATIONS, REHAB EVAL
Pt received ambulating in hospital room, NAD. Pt agreeable to PT consultation. Cleared for PT as per PATRICIA Ramirez

## 2020-12-11 NOTE — DISCHARGE NOTE PROVIDER - NSDCFUSCHEDAPPT_GEN_ALL_CORE_FT
CHER GEORGE ; 12/14/2020 ; \[facility]\"" Cardio [address]  CHER GEORGE ; 01/05/2021 ; [facility] [address]  CHER GEORGE ; 01/12/2021 ; \[facility]\"" Cardio [address]

## 2020-12-11 NOTE — DISCHARGE NOTE PROVIDER - CARE PROVIDERS DIRECT ADDRESSES
[email or web address]

## 2020-12-11 NOTE — PHYSICAL THERAPY INITIAL EVALUATION ADULT - PERTINENT HX OF CURRENT PROBLEM, REHAB EVAL
89 y.o. Male with CAD s/p CABG, stent, GERD, BPH presenting with chest pain with orthostatic hypotension.

## 2020-12-12 ENCOUNTER — TRANSCRIPTION ENCOUNTER (OUTPATIENT)
Age: 85
End: 2020-12-12

## 2020-12-12 VITALS
OXYGEN SATURATION: 98 % | HEART RATE: 78 BPM | DIASTOLIC BLOOD PRESSURE: 82 MMHG | RESPIRATION RATE: 19 BRPM | TEMPERATURE: 98 F | SYSTOLIC BLOOD PRESSURE: 150 MMHG

## 2020-12-12 LAB
ANION GAP SERPL CALC-SCNC: 12 MMOL/L — SIGNIFICANT CHANGE UP (ref 7–14)
BUN SERPL-MCNC: 16 MG/DL — SIGNIFICANT CHANGE UP (ref 7–23)
CALCIUM SERPL-MCNC: 8.1 MG/DL — LOW (ref 8.4–10.5)
CHLORIDE SERPL-SCNC: 109 MMOL/L — HIGH (ref 98–107)
CO2 SERPL-SCNC: 21 MMOL/L — LOW (ref 22–31)
CREAT SERPL-MCNC: 0.89 MG/DL — SIGNIFICANT CHANGE UP (ref 0.5–1.3)
GLUCOSE SERPL-MCNC: 92 MG/DL — SIGNIFICANT CHANGE UP (ref 70–99)
HCT VFR BLD CALC: 29.5 % — LOW (ref 39–50)
HGB BLD-MCNC: 9.3 G/DL — LOW (ref 13–17)
MAGNESIUM SERPL-MCNC: 2 MG/DL — SIGNIFICANT CHANGE UP (ref 1.6–2.6)
MCHC RBC-ENTMCNC: 31.5 GM/DL — LOW (ref 32–36)
MCHC RBC-ENTMCNC: 31.7 PG — SIGNIFICANT CHANGE UP (ref 27–34)
MCV RBC AUTO: 100.7 FL — HIGH (ref 80–100)
NRBC # BLD: 0 /100 WBCS — SIGNIFICANT CHANGE UP
NRBC # FLD: 0 K/UL — SIGNIFICANT CHANGE UP
PHOSPHATE SERPL-MCNC: 2.2 MG/DL — LOW (ref 2.5–4.5)
PLATELET # BLD AUTO: 191 K/UL — SIGNIFICANT CHANGE UP (ref 150–400)
POTASSIUM SERPL-MCNC: 4.1 MMOL/L — SIGNIFICANT CHANGE UP (ref 3.5–5.3)
POTASSIUM SERPL-SCNC: 4.1 MMOL/L — SIGNIFICANT CHANGE UP (ref 3.5–5.3)
RBC # BLD: 2.93 M/UL — LOW (ref 4.2–5.8)
RBC # FLD: 15.9 % — HIGH (ref 10.3–14.5)
SODIUM SERPL-SCNC: 142 MMOL/L — SIGNIFICANT CHANGE UP (ref 135–145)
WBC # BLD: 6.59 K/UL — SIGNIFICANT CHANGE UP (ref 3.8–10.5)
WBC # FLD AUTO: 6.59 K/UL — SIGNIFICANT CHANGE UP (ref 3.8–10.5)

## 2020-12-12 PROCEDURE — 99239 HOSP IP/OBS DSCHRG MGMT >30: CPT

## 2020-12-12 PROCEDURE — 74019 RADEX ABDOMEN 2 VIEWS: CPT | Mod: 26

## 2020-12-12 RX ORDER — TAMSULOSIN HYDROCHLORIDE 0.4 MG/1
0 CAPSULE ORAL
Qty: 0 | Refills: 0 | DISCHARGE

## 2020-12-12 RX ORDER — BUDESONIDE, MICRONIZED 100 %
9 POWDER (GRAM) MISCELLANEOUS EVERY 24 HOURS
Refills: 0 | Status: DISCONTINUED | OUTPATIENT
Start: 2020-12-12 | End: 2020-12-12

## 2020-12-12 RX ORDER — ACETAMINOPHEN 500 MG
1000 TABLET ORAL ONCE
Refills: 0 | Status: DISCONTINUED | OUTPATIENT
Start: 2020-12-12 | End: 2020-12-12

## 2020-12-12 RX ORDER — METOPROLOL TARTRATE 50 MG
1 TABLET ORAL
Qty: 30 | Refills: 0
Start: 2020-12-12 | End: 2021-01-10

## 2020-12-12 RX ORDER — MORPHINE SULFATE 50 MG/1
4 CAPSULE, EXTENDED RELEASE ORAL ONCE
Refills: 0 | Status: DISCONTINUED | OUTPATIENT
Start: 2020-12-12 | End: 2020-12-12

## 2020-12-12 RX ORDER — AMLODIPINE BESYLATE 2.5 MG/1
1 TABLET ORAL
Qty: 0 | Refills: 0 | DISCHARGE

## 2020-12-12 RX ORDER — BUDESONIDE, MICRONIZED 100 %
1 POWDER (GRAM) MISCELLANEOUS
Qty: 0 | Refills: 0 | DISCHARGE

## 2020-12-12 RX ADMIN — MORPHINE SULFATE 4 MILLIGRAM(S): 50 CAPSULE, EXTENDED RELEASE ORAL at 03:09

## 2020-12-12 RX ADMIN — PANTOPRAZOLE SODIUM 40 MILLIGRAM(S): 20 TABLET, DELAYED RELEASE ORAL at 06:08

## 2020-12-12 RX ADMIN — HEPARIN SODIUM 5000 UNIT(S): 5000 INJECTION INTRAVENOUS; SUBCUTANEOUS at 06:09

## 2020-12-12 RX ADMIN — MORPHINE SULFATE 4 MILLIGRAM(S): 50 CAPSULE, EXTENDED RELEASE ORAL at 04:09

## 2020-12-12 RX ADMIN — Medication 650 MILLIGRAM(S): at 06:55

## 2020-12-12 RX ADMIN — Medication 9 MILLIGRAM(S): at 12:15

## 2020-12-12 RX ADMIN — FINASTERIDE 5 MILLIGRAM(S): 5 TABLET, FILM COATED ORAL at 12:15

## 2020-12-12 RX ADMIN — Medication 81 MILLIGRAM(S): at 12:15

## 2020-12-12 RX ADMIN — Medication 650 MILLIGRAM(S): at 06:08

## 2020-12-12 RX ADMIN — Medication 25 MILLIGRAM(S): at 06:08

## 2020-12-12 NOTE — PROVIDER CONTACT NOTE (OTHER) - ASSESSMENT
Aox4, Patient vitals are elevated BP of168/93 HR 84, 97.6 temp. 98% on Room air. Abdominal sound present.

## 2020-12-12 NOTE — DISCHARGE NOTE NURSING/CASE MANAGEMENT/SOCIAL WORK - PATIENT PORTAL LINK FT
You can access the FollowMyHealth Patient Portal offered by [facility] by registering at the following website: [email or web address]. By joining [employer]’s FollowMyHealth portal, you will also be able to view your health information using other applications (apps) compatible with our system.

## 2020-12-12 NOTE — PROVIDER CONTACT NOTE (OTHER) - BACKGROUND
Patient is admitted for chest pain. History of CAD, mild LV dysfunction, Angina of effort, HLD, GERD. Patient stats that he has history of crohns and chronic abd obstruction.

## 2020-12-12 NOTE — DISCHARGE NOTE NURSING/CASE MANAGEMENT/SOCIAL WORK - NSDCFUADDAPPT_GEN_ALL_CORE_FT
Please follow up with outpatient cardiologist on 12/14 at 1pm as scheduled.  Please follow up with your PCP.  Please follow up with your Gastroenterologist Dr. Milan 1/5 at 10:15 AM as scheduled.

## 2020-12-12 NOTE — CHART NOTE - NSCHARTNOTEFT_GEN_A_CORE
PA Note     Called by nurse, Pt is c/o severe abdominal pain        Vital Signs Last 24 Hrs  T(C): 36.4 (12 Dec 2020 02:25), Max: 36.9 (11 Dec 2020 06:00)  T(F): 97.6 (12 Dec 2020 02:25), Max: 98.5 (11 Dec 2020 16:00)  HR: 84 (12 Dec 2020 02:25) (72 - 88)  BP: 168/93 (12 Dec 2020 02:25) (118/52 - 168/93)  RR: 20 (12 Dec 2020 02:25) (18 - 20)  SpO2: 98% (12 Dec 2020 02:25) (98% - 98%)      PHYSICAL EXAM:    Constitutional: Well developed, no signs of acute distress, A&O X 3  Skin: No rash or erythema, good turgor  Respiratory: Clear to Auscultation bilaterally,  No Wheezing /crackles /rhonchi  Cardiovascular: Positive  S1 and S2, Regular rate rhythm, No murmurs    Gastrointestinal:  Soft, slightly distended, mild tenderness, hypoactive BS       This is a 89 year old Male w/ PMHx SBO s/p SBR (~20 years ago,) w/ many subsequent presentations for SBO usually managed by NGT, non-operatively per Pt, CAD s/p CABG, presenting with admitted for chest pain dizziness for to be orthostatic hypotensive treated with fluid and resolved.  Patient is c/o acute abdominal pain similar to his previous SBO.      Abdominal Pain R/O SBO  - AXR Ordered Stat  - Tylenol 1 gram IV Ordered, Pt refused because it has not work in the past  - Morphine 4 mg IVP X 1 Ordered   - Will FU AXR Result  - Consider NGT f Positive for SBO     # ileitis - etiology unclear. Patient's colonoscopy of terminal ileal narrowing w/ ulceration is concerning for Crohn's disease, pathology pending. However upon review of CT scan, patient has ~15cm of ileal wall thickening, with proximal luminal dilation. It is unlikely that narrowing seen on colonoscopy is culprit for the extensive wall thickening and dilated loops of small bowel. It is possible that patient has two conditions, underlying Crohn's disease (which may explain the initial SBO and bezoar) with subsequent surgical scarring/adhesions, and current presentation is a combination of both. Will follow up pathology, and will start budesonide for possible Crohn's disease, and will recommend monitoring abd symptoms and CT findings, and can decide if further surgical intervention is necessary. PA Note     Called by nurse, Pt is c/o severe abdominal pain    This is a 89 year old Male w/ PMHx recurrent SBO s/p SBR for Bezoar (~20 years ago,) w/ many subsequent presentations for SBO usually managed by NGT, or spontaneously resolved after a few days, CAD s/p CABG, presenting with admitted for chest pain dizziness for to be orthostatic hypotensive treated with fluid and resolved.  Patient is c/o acute intermittent epigastric pain with a severity of 8/10 that started about two hours ago.  He complains of feeling very bloated and had about 2-3 BM within the last 24 hours.  He states his abdominal pain is similar to his previous SBO.  He denies fever, nausea or vomiting, chills, constipation, chest pain, palpitations, SOB. Pt was recently admitted in October for SBO and had a colonoscopy that showed one small polyp in the transverse colon, Stricture in the terminal ileum, A single (solitary) ulcer at the ileocecal valve and Outpouching in the terminal ileum likely diverticula vs fistula orifice. Findings suspicious for Crohn's disease- in which budesonide was started.    Vital Signs Last 24 Hrs  T(C): 36.4 (12 Dec 2020 02:25), Max: 36.9 (11 Dec 2020 06:00)  T(F): 97.6 (12 Dec 2020 02:25), Max: 98.5 (11 Dec 2020 16:00)  HR: 84 (12 Dec 2020 02:25) (72 - 88)  BP: 168/93 (12 Dec 2020 02:25) (118/52 - 168/93)  RR: 20 (12 Dec 2020 02:25) (18 - 20)  SpO2: 98% (12 Dec 2020 02:25) (98% - 98%)    PHYSICAL EXAM:  Constitutional: Well developed, no signs of acute distress, A&O X 3  Skin: No rash or erythema, good turgor  Respiratory: Clear to Auscultation bilaterally,  No Wheezing /crackles /rhonchi  Cardiovascular: Positive  S1 and S2, Regular rate rhythm, No murmurs    Gastrointestinal:  Soft, slightly distended, mild tenderness, hypoactive BS     This is a 89 year old Male w/ PMHx SBO s/p SBR (~20 years ago,) w/ many subsequent presentations for SBO usually managed by NGT, non-operatively per Pt, CAD s/p CABG, presenting with admitted for chest pain dizziness for to be orthostatic hypotensive treated with fluid and resolved.  Patient is c/o acute abdominal pain similar to his previous SBO.      Abdominal Pain R/O SBO  - Will keep Pt NPO for now  - AXR Ordered Stat  - Tylenol 1 gram IV Ordered, Pt refused because it has not worked in the past  - Morphine 4 mg IVP X 1 Ordered   - Will FU AXR Result  - Consider NGT if SBO is present, if pain resolves imaging is negative, advance diet as tolerated.   - Will continue to monitor PA Note     Called by nurse, Pt is c/o severe abdominal pain    This is a 89 year old Male w/ PMHx Crohn's disease, recurrent SBO s/p SBR for Bezoar (~20 years ago,) w/ many subsequent presentations for SBO usually managed by NGT, or spontaneously resolved after a few days, CAD s/p CABG, presenting with admitted for chest pain dizziness for to be orthostatic hypotensive treated with fluid and resolved.  Patient is c/o acute intermittent epigastric pain with a severity of 8/10 that started about two hours ago.  He complains of feeling very bloated and had about 2-3 BM within the last 24 hours.  He states his abdominal pain is similar to his previous SBO.  He denies fever, nausea or vomiting, chills, constipation, chest pain, palpitations, SOB. Pt was recently admitted in October for SBO and had a colonoscopy that showed one small polyp in the transverse colon, Stricture in the terminal ileum, A single (solitary) ulcer at the ileocecal valve and Outpouching in the terminal ileum likely diverticula vs fistula orifice. Findings suspicious for Crohn's disease- in which budesonide was started.    Vital Signs Last 24 Hrs  T(C): 36.4 (12 Dec 2020 02:25), Max: 36.9 (11 Dec 2020 06:00)  T(F): 97.6 (12 Dec 2020 02:25), Max: 98.5 (11 Dec 2020 16:00)  HR: 84 (12 Dec 2020 02:25) (72 - 88)  BP: 168/93 (12 Dec 2020 02:25) (118/52 - 168/93)  RR: 20 (12 Dec 2020 02:25) (18 - 20)  SpO2: 98% (12 Dec 2020 02:25) (98% - 98%)    PHYSICAL EXAM:  Constitutional: Well developed, no signs of acute distress, A&O X 3  Skin: No rash or erythema, good turgor  Respiratory: Clear to Auscultation bilaterally,  No Wheezing /crackles /rhonchi  Cardiovascular: Positive  S1 and S2, Regular rate rhythm, No murmurs    Gastrointestinal:  Soft, slightly distended, mild tenderness, hypoactive BS     This is a 89 year old Male w/ PMHx SBO s/p SBR (~20 years ago,) w/ many subsequent presentations for SBO usually managed by NGT, non-operatively per Pt, CAD s/p CABG, presenting with admitted for chest pain dizziness for to be orthostatic hypotensive treated with fluid and resolved.  Patient is c/o acute abdominal pain similar to his previous SBO.      Abdominal Pain R/O SBO  - Will keep Pt NPO for now  - AXR Ordered Stat  - Tylenol 1 gram IV Ordered, Pt refused because it has not worked in the past  - Morphine 4 mg IVP X 1 Ordered   - Will FU AXR Result  - Consider NGT if SBO is present, if pain resolves imaging is negative, advance diet as tolerated.   - Will continue to monitor

## 2020-12-14 ENCOUNTER — NON-APPOINTMENT (OUTPATIENT)
Age: 85
End: 2020-12-14

## 2020-12-14 ENCOUNTER — APPOINTMENT (OUTPATIENT)
Dept: CARDIOLOGY | Facility: CLINIC | Age: 85
End: 2020-12-14
Payer: MEDICARE

## 2020-12-14 VITALS — DIASTOLIC BLOOD PRESSURE: 66 MMHG | SYSTOLIC BLOOD PRESSURE: 120 MMHG

## 2020-12-14 VITALS
DIASTOLIC BLOOD PRESSURE: 66 MMHG | TEMPERATURE: 97.6 F | HEART RATE: 95 BPM | SYSTOLIC BLOOD PRESSURE: 140 MMHG | RESPIRATION RATE: 17 BRPM | OXYGEN SATURATION: 98 % | WEIGHT: 125 LBS | HEIGHT: 66 IN | BODY MASS INDEX: 20.09 KG/M2

## 2020-12-14 VITALS — SYSTOLIC BLOOD PRESSURE: 108 MMHG | DIASTOLIC BLOOD PRESSURE: 60 MMHG

## 2020-12-14 DIAGNOSIS — I25.10 ATHEROSCLEROTIC HEART DISEASE OF NATIVE CORONARY ARTERY W/OUT ANGINA PECTORIS: ICD-10-CM

## 2020-12-14 DIAGNOSIS — R94.31 ABNORMAL ELECTROCARDIOGRAM [ECG] [EKG]: ICD-10-CM

## 2020-12-14 PROCEDURE — 93000 ELECTROCARDIOGRAM COMPLETE: CPT

## 2020-12-14 PROCEDURE — 99214 OFFICE O/P EST MOD 30 MIN: CPT

## 2020-12-14 NOTE — HISTORY OF PRESENT ILLNESS
[FreeTextEntry1] : He felt unwell for a few weeks prior to his visit to a new PMD on 12/9.\par He thougth that stopping his medications, all of his medications on 12/8.\par Seen in the ER for chest pain, noted to have an unchanged ischemic burden\par Still orthostatic.\par \par \par  \par  \par \par

## 2020-12-14 NOTE — DISCUSSION/SUMMARY
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 No angina. No ECG changes. Nuclear stress test was unchangd.\par  \par ECG: MAT (WAP)\par Continue ASA and statin.\par Hold beta-blocker for now.\par orthostataic Blood work\par increase fluid intake/salt.\par

## 2020-12-17 LAB
ALBUMIN SERPL ELPH-MCNC: 3.5 G/DL
ALP BLD-CCNC: 76 U/L
ALT SERPL-CCNC: 35 U/L
ANION GAP SERPL CALC-SCNC: 14 MMOL/L
AST SERPL-CCNC: 31 U/L
BILIRUB SERPL-MCNC: 0.4 MG/DL
BUN SERPL-MCNC: 20 MG/DL
CALCIUM SERPL-MCNC: 8.5 MG/DL
CHLORIDE SERPL-SCNC: 110 MMOL/L
CO2 SERPL-SCNC: 19 MMOL/L
CORTIS SERPL-MCNC: 0.9 UG/DL
CREAT SERPL-MCNC: 1.17 MG/DL
CRP SERPL-MCNC: 0.59 MG/DL
ERYTHROCYTE [SEDIMENTATION RATE] IN BLOOD BY WESTERGREN METHOD: 37 MM/HR
GLUCOSE SERPL-MCNC: 91 MG/DL
POTASSIUM SERPL-SCNC: 4.2 MMOL/L
PROT SERPL-MCNC: 5.3 G/DL
SARS-COV-2 IGG SERPL IA-ACNC: 0.06 INDEX
SARS-COV-2 IGG SERPL QL IA: NEGATIVE
SODIUM SERPL-SCNC: 143 MMOL/L
TSH SERPL-ACNC: 1.38 UIU/ML

## 2020-12-18 ENCOUNTER — APPOINTMENT (OUTPATIENT)
Dept: GERIATRICS | Facility: CLINIC | Age: 85
End: 2020-12-18
Payer: MEDICARE

## 2020-12-18 VITALS
TEMPERATURE: 97 F | HEART RATE: 87 BPM | RESPIRATION RATE: 18 BRPM | BODY MASS INDEX: 19.64 KG/M2 | SYSTOLIC BLOOD PRESSURE: 140 MMHG | OXYGEN SATURATION: 97 % | HEIGHT: 67.2 IN | DIASTOLIC BLOOD PRESSURE: 80 MMHG | WEIGHT: 126.6 LBS

## 2020-12-18 DIAGNOSIS — I25.10 ATHEROSCLEROTIC HEART DISEASE OF NATIVE CORONARY ARTERY W/OUT ANGINA PECTORIS: ICD-10-CM

## 2020-12-18 DIAGNOSIS — R26.81 UNSTEADINESS ON FEET: ICD-10-CM

## 2020-12-18 PROCEDURE — 99214 OFFICE O/P EST MOD 30 MIN: CPT

## 2020-12-18 RX ORDER — CEFDINIR 300 MG/1
300 CAPSULE ORAL
Refills: 0 | Status: DISCONTINUED | COMMUNITY
Start: 2020-11-24 | End: 2020-12-18

## 2020-12-18 RX ORDER — TAMSULOSIN HYDROCHLORIDE 0.4 MG/1
0.4 CAPSULE ORAL DAILY
Refills: 0 | Status: DISCONTINUED | COMMUNITY
Start: 2020-11-27 | End: 2020-12-18

## 2020-12-18 NOTE — HISTORY OF PRESENT ILLNESS
[FreeTextEntry1] : 89yoM with pmhx of cad s/p cabg, pci, HLD, BPH, anemia, Crohn's disease, recent hospitalization with RML PNA, then recurrent hospitalization with chest pain with negative cardiac work up, but with orthostatic hypotension.  \par \par today he reports he feels fatigued, tired.  \par he is still taking flomax.  \par denies chest pain or palpitations.\par \par chron's disease:\par reports frequent loose stool/diarrhea.  today he had diarrhea 3 x already.\par states he is a picky eater, some foods worsens his diarrhea.\par typical diet is :  rice krispies, pizza, and pasta.  drinks about 3 glasses of water a day.\par no snacks.\par \par notes his legs are weaker,  he is losing weight.  He does have HHA during day to can help him make food, but he prefers to get the groceries with his HHA.  doesn't like her cooking.\par \par has a cane.  has a walker at home but doesn't use it.\par \par hcp: dtrs Christine and Karla\par Full code\par \par

## 2020-12-18 NOTE — ASSESSMENT
[FreeTextEntry1] : still orthostatic today  in office with drop of 20 mmHg systolic with standing\par \par also taking vitamin b12 oral supplment.for awhile.

## 2020-12-18 NOTE — PHYSICAL EXAM
[General Appearance - Alert] : alert [General Appearance - In No Acute Distress] : in no acute distress [Sclera] : the sclera and conjunctiva were normal [Extraocular Movements] : extraocular movements were intact [Outer Ear] : the ears and nose were normal in appearance [Neck Appearance] : the appearance of the neck was normal [Respiration, Rhythm And Depth] : normal respiratory rhythm and effort [Exaggerated Use Of Accessory Muscles For Inspiration] : no accessory muscle use [Auscultation Breath Sounds / Voice Sounds] : lungs were clear to auscultation bilaterally [Heart Rate And Rhythm] : heart rate was normal and rhythm regular [Heart Sounds] : normal S1 and S2 [Edema] : there was no peripheral edema [Bowel Sounds] : normal bowel sounds [Abdomen Soft] : soft [Abdomen Tenderness] : non-tender [No Spinal Tenderness] : no spinal tenderness [Nail Clubbing] : no clubbing  or cyanosis of the fingernails [Involuntary Movements] : no involuntary movements were seen [Skin Color & Pigmentation] : normal skin color and pigmentation [No Focal Deficits] : no focal deficits [Impaired Insight] : insight and judgment were intact [Affect] : the affect was normal [Mood] : the mood was normal [FreeTextEntry1] : poor control upon sitting, collapses into chair.  atrophied muscles of lower ext [] : no rash

## 2020-12-22 LAB
CORTIS SERPL-MCNC: 1.4 UG/DL
FERRITIN SERPL-MCNC: 89 NG/ML
FOLATE SERPL-MCNC: 16.6 NG/ML
IRON SATN MFR SERPL: 16 %
IRON SERPL-MCNC: 37 UG/DL
TIBC SERPL-MCNC: 225 UG/DL
UIBC SERPL-MCNC: 188 UG/DL
VIT B12 SERPL-MCNC: 1305 PG/ML

## 2020-12-23 ENCOUNTER — NON-APPOINTMENT (OUTPATIENT)
Age: 85
End: 2020-12-23

## 2020-12-28 LAB
HOMOCYSTEINE LEVEL: 6.6 UMOL/L
METHYLMALONIC ACID LEVEL: 196 NMOL/L

## 2021-01-05 ENCOUNTER — APPOINTMENT (OUTPATIENT)
Dept: GASTROENTEROLOGY | Facility: CLINIC | Age: 86
End: 2021-01-05
Payer: MEDICARE

## 2021-01-05 VITALS
WEIGHT: 126 LBS | OXYGEN SATURATION: 97 % | TEMPERATURE: 97.2 F | BODY MASS INDEX: 20.25 KG/M2 | HEIGHT: 66 IN | SYSTOLIC BLOOD PRESSURE: 134 MMHG | DIASTOLIC BLOOD PRESSURE: 80 MMHG | HEART RATE: 111 BPM

## 2021-01-05 PROCEDURE — 99214 OFFICE O/P EST MOD 30 MIN: CPT

## 2021-01-05 NOTE — HISTORY OF PRESENT ILLNESS
[de-identified] : 89-year-old male, ileal Crohn's disease\par History of past resection for obstruction, patient told was related to "bezoar"\par Recent recurrent bowel obstructions, more frequently\par During last hospitalization colonoscopy performed showing inflammation of distal ileum imaging, colonoscopy and biopsies consistent with Crohn's disease\par Patient's obstructive complaints resolved dramatically with Entocort\par Patient however more recently with complaints of fatigue, found to be adrenal insufficient\par Discontinued Entocort started on prednisone 5 mg daily by his endocrinologist Dr. Patricio Roldan\par [phone]\par \par Patient had complained to other physicians that he was having increasing diarrhea, but denies any at this time\par Reports 2-3 formed bowel movements a day\par Denies cramps\par No obstructive complaints

## 2021-01-05 NOTE — PHYSICAL EXAM
[General Appearance - Alert] : alert [General Appearance - In No Acute Distress] : in no acute distress [Sclera] : the sclera and conjunctiva were normal [PERRL With Normal Accommodation] : pupils were equal in size, round, and reactive to light [Extraocular Movements] : extraocular movements were intact [Outer Ear] : the ears and nose were normal in appearance [Oropharynx] : the oropharynx was normal [Edema] : there was no peripheral edema [Bowel Sounds] : normal bowel sounds [Abdomen Soft] : soft [Oriented To Time, Place, And Person] : oriented to person, place, and time [Impaired Insight] : insight and judgment were intact [Affect] : the affect was normal

## 2021-01-05 NOTE — REVIEW OF SYSTEMS
[Feeling Poorly] : feeling poorly [Feeling Tired] : feeling tired [As Noted in HPI] : as noted in HPI [Negative] : Respiratory

## 2021-01-05 NOTE — ASSESSMENT
[FreeTextEntry1] : Crohn's ileitis\par No obstructive complaints or bowel issues at this time on prednisone 5 mg daily\par Some persistent fatigue\par \par Plan\par Prednisone taper per endocrinology\par Blood testing today for tuberculosis and hepatitis B, should biologic therapy become necessary for patient in the near future\par i.e. if Crohn's complaints should recur once corticosteroid discontinued\par Office visit again in 6 weeks

## 2021-01-06 ENCOUNTER — EMERGENCY (EMERGENCY)
Facility: HOSPITAL | Age: 86
LOS: 1 days | Discharge: HOME | End: 2021-01-06
Attending: EMERGENCY MEDICINE
Payer: MEDICARE

## 2021-01-06 VITALS
DIASTOLIC BLOOD PRESSURE: 77 MMHG | TEMPERATURE: 98 F | WEIGHT: 119.05 LBS | SYSTOLIC BLOOD PRESSURE: 118 MMHG | HEIGHT: 66 IN | HEART RATE: 103 BPM | OXYGEN SATURATION: 98 % | RESPIRATION RATE: 20 BRPM

## 2021-01-06 VITALS
SYSTOLIC BLOOD PRESSURE: 150 MMHG | DIASTOLIC BLOOD PRESSURE: 94 MMHG | RESPIRATION RATE: 17 BRPM | HEART RATE: 79 BPM | TEMPERATURE: 98 F | OXYGEN SATURATION: 97 %

## 2021-01-06 DIAGNOSIS — Z95.1 PRESENCE OF AORTOCORONARY BYPASS GRAFT: Chronic | ICD-10-CM

## 2021-01-06 LAB
ALBUMIN SERPL ELPH-MCNC: 2.9 G/DL — LOW (ref 3.3–5)
ALP SERPL-CCNC: 63 U/L — SIGNIFICANT CHANGE UP (ref 40–120)
ALT FLD-CCNC: 20 U/L — SIGNIFICANT CHANGE UP (ref 10–45)
ANION GAP SERPL CALC-SCNC: 9 MMOL/L — SIGNIFICANT CHANGE UP (ref 5–17)
APPEARANCE UR: CLEAR — SIGNIFICANT CHANGE UP
AST SERPL-CCNC: 20 U/L — SIGNIFICANT CHANGE UP (ref 10–40)
BASE EXCESS BLDV CALC-SCNC: 0.4 MMOL/L — SIGNIFICANT CHANGE UP (ref -2–2)
BILIRUB SERPL-MCNC: 0.4 MG/DL — SIGNIFICANT CHANGE UP (ref 0.2–1.2)
BILIRUB UR-MCNC: NEGATIVE — SIGNIFICANT CHANGE UP
BUN SERPL-MCNC: 20 MG/DL — SIGNIFICANT CHANGE UP (ref 7–23)
CA-I SERPL-SCNC: 1.14 MMOL/L — SIGNIFICANT CHANGE UP (ref 1.12–1.3)
CALCIUM SERPL-MCNC: 8.7 MG/DL — SIGNIFICANT CHANGE UP (ref 8.4–10.5)
CHLORIDE BLDV-SCNC: 115 MMOL/L — HIGH (ref 96–108)
CHLORIDE SERPL-SCNC: 109 MMOL/L — HIGH (ref 96–108)
CO2 BLDV-SCNC: 27 MMOL/L — SIGNIFICANT CHANGE UP (ref 22–30)
CO2 SERPL-SCNC: 22 MMOL/L — SIGNIFICANT CHANGE UP (ref 22–31)
COLOR SPEC: SIGNIFICANT CHANGE UP
CREAT SERPL-MCNC: 1.04 MG/DL — SIGNIFICANT CHANGE UP (ref 0.5–1.3)
D DIMER BLD IA.RAPID-MCNC: 608 NG/ML DDU — HIGH
DIFF PNL FLD: NEGATIVE — SIGNIFICANT CHANGE UP
GAS PNL BLDV: 137 MMOL/L — SIGNIFICANT CHANGE UP (ref 135–145)
GAS PNL BLDV: SIGNIFICANT CHANGE UP
GAS PNL BLDV: SIGNIFICANT CHANGE UP
GLUCOSE BLDV-MCNC: 118 MG/DL — HIGH (ref 70–99)
GLUCOSE SERPL-MCNC: 118 MG/DL — HIGH (ref 70–99)
GLUCOSE UR QL: NEGATIVE — SIGNIFICANT CHANGE UP
HBV CORE IGM SER QL: NONREACTIVE
HBV SURFACE AB SER QL: REACTIVE
HBV SURFACE AG SER QL: NONREACTIVE
HCO3 BLDV-SCNC: 25 MMOL/L — SIGNIFICANT CHANGE UP (ref 21–29)
HCT VFR BLD CALC: 31 % — LOW (ref 39–50)
HCT VFR BLDA CALC: 30 % — LOW (ref 39–50)
HGB BLD CALC-MCNC: 9.6 G/DL — LOW (ref 13–17)
HGB BLD-MCNC: 9.6 G/DL — LOW (ref 13–17)
KETONES UR-MCNC: NEGATIVE — SIGNIFICANT CHANGE UP
LACTATE BLDV-MCNC: 3.6 MMOL/L — HIGH (ref 0.7–2)
LEUKOCYTE ESTERASE UR-ACNC: NEGATIVE — SIGNIFICANT CHANGE UP
MCHC RBC-ENTMCNC: 31 GM/DL — LOW (ref 32–36)
MCHC RBC-ENTMCNC: 32.8 PG — SIGNIFICANT CHANGE UP (ref 27–34)
MCV RBC AUTO: 105.8 FL — HIGH (ref 80–100)
NITRITE UR-MCNC: NEGATIVE — SIGNIFICANT CHANGE UP
NRBC # BLD: 0 /100 WBCS — SIGNIFICANT CHANGE UP (ref 0–0)
PCO2 BLDV: 44 MMHG — SIGNIFICANT CHANGE UP (ref 35–50)
PH BLDV: 7.37 — SIGNIFICANT CHANGE UP (ref 7.35–7.45)
PH UR: 7 — SIGNIFICANT CHANGE UP (ref 5–8)
PLATELET # BLD AUTO: 221 K/UL — SIGNIFICANT CHANGE UP (ref 150–400)
PO2 BLDV: <20 MMHG — LOW (ref 25–45)
POTASSIUM BLDV-SCNC: 4.4 MMOL/L — SIGNIFICANT CHANGE UP (ref 3.5–5.3)
POTASSIUM SERPL-MCNC: 5.2 MMOL/L — SIGNIFICANT CHANGE UP (ref 3.5–5.3)
POTASSIUM SERPL-SCNC: 5.2 MMOL/L — SIGNIFICANT CHANGE UP (ref 3.5–5.3)
PROT SERPL-MCNC: 4.8 G/DL — LOW (ref 6–8.3)
PROT UR-MCNC: SIGNIFICANT CHANGE UP
RBC # BLD: 2.93 M/UL — LOW (ref 4.2–5.8)
RBC # FLD: 16 % — HIGH (ref 10.3–14.5)
SAO2 % BLDV: 18 % — LOW (ref 67–88)
SARS-COV-2 RNA SPEC QL NAA+PROBE: SIGNIFICANT CHANGE UP
SODIUM SERPL-SCNC: 140 MMOL/L — SIGNIFICANT CHANGE UP (ref 135–145)
SP GR SPEC: 1.02 — SIGNIFICANT CHANGE UP (ref 1.01–1.02)
TSH SERPL-MCNC: 1.34 UIU/ML — SIGNIFICANT CHANGE UP (ref 0.27–4.2)
UROBILINOGEN FLD QL: NEGATIVE — SIGNIFICANT CHANGE UP
WBC # BLD: 8.21 K/UL — SIGNIFICANT CHANGE UP (ref 3.8–10.5)
WBC # FLD AUTO: 8.21 K/UL — SIGNIFICANT CHANGE UP (ref 3.8–10.5)

## 2021-01-06 PROCEDURE — 82330 ASSAY OF CALCIUM: CPT

## 2021-01-06 PROCEDURE — 84443 ASSAY THYROID STIM HORMONE: CPT

## 2021-01-06 PROCEDURE — 87635 SARS-COV-2 COVID-19 AMP PRB: CPT

## 2021-01-06 PROCEDURE — 84132 ASSAY OF SERUM POTASSIUM: CPT

## 2021-01-06 PROCEDURE — 99285 EMERGENCY DEPT VISIT HI MDM: CPT | Mod: GC

## 2021-01-06 PROCEDURE — 85027 COMPLETE CBC AUTOMATED: CPT

## 2021-01-06 PROCEDURE — 84295 ASSAY OF SERUM SODIUM: CPT

## 2021-01-06 PROCEDURE — 99284 EMERGENCY DEPT VISIT MOD MDM: CPT | Mod: 25

## 2021-01-06 PROCEDURE — 83880 ASSAY OF NATRIURETIC PEPTIDE: CPT

## 2021-01-06 PROCEDURE — 84484 ASSAY OF TROPONIN QUANT: CPT

## 2021-01-06 PROCEDURE — 82947 ASSAY GLUCOSE BLOOD QUANT: CPT

## 2021-01-06 PROCEDURE — 93005 ELECTROCARDIOGRAM TRACING: CPT

## 2021-01-06 PROCEDURE — 82803 BLOOD GASES ANY COMBINATION: CPT

## 2021-01-06 PROCEDURE — 71275 CT ANGIOGRAPHY CHEST: CPT | Mod: 26

## 2021-01-06 PROCEDURE — 85014 HEMATOCRIT: CPT

## 2021-01-06 PROCEDURE — 85379 FIBRIN DEGRADATION QUANT: CPT

## 2021-01-06 PROCEDURE — 82565 ASSAY OF CREATININE: CPT

## 2021-01-06 PROCEDURE — 85018 HEMOGLOBIN: CPT

## 2021-01-06 PROCEDURE — 82435 ASSAY OF BLOOD CHLORIDE: CPT

## 2021-01-06 PROCEDURE — 71275 CT ANGIOGRAPHY CHEST: CPT

## 2021-01-06 PROCEDURE — 80053 COMPREHEN METABOLIC PANEL: CPT

## 2021-01-06 PROCEDURE — 81003 URINALYSIS AUTO W/O SCOPE: CPT

## 2021-01-06 PROCEDURE — U0005: CPT

## 2021-01-06 PROCEDURE — 93010 ELECTROCARDIOGRAM REPORT: CPT | Mod: GC

## 2021-01-06 PROCEDURE — 71046 X-RAY EXAM CHEST 2 VIEWS: CPT

## 2021-01-06 PROCEDURE — 71046 X-RAY EXAM CHEST 2 VIEWS: CPT | Mod: 26

## 2021-01-06 PROCEDURE — 83605 ASSAY OF LACTIC ACID: CPT

## 2021-01-06 RX ORDER — SODIUM CHLORIDE 9 MG/ML
1000 INJECTION INTRAMUSCULAR; INTRAVENOUS; SUBCUTANEOUS ONCE
Refills: 0 | Status: COMPLETED | OUTPATIENT
Start: 2021-01-06 | End: 2021-01-06

## 2021-01-06 RX ADMIN — SODIUM CHLORIDE 1000 MILLILITER(S): 9 INJECTION INTRAMUSCULAR; INTRAVENOUS; SUBCUTANEOUS at 12:07

## 2021-01-06 NOTE — ED PROVIDER NOTE - OBJECTIVE STATEMENT
Attn- pt seen in OR64 - c/o weakness x one month and this am felt weakness in both legs and thought was going to fall.  pt also reports sob x 2-3 weeks.  Also reports 12 lbs wt loss over 2 months.  pt seen by her PMD, cardiology, endocrine and GI over the last month and reports neg Pharm Nuc stress.  NO: fever, cough, chest pain, no n/v.  Found to have adrenal insufficiency and crohn's and taking Prednisone 5 mgs daily.   no numbness, weakness, back or neck pain or headache. no falls or head trauma.  no dysuria or frequency.

## 2021-01-06 NOTE — ED ADULT NURSE NOTE - ED CARDIAC RATE
Reports having headache for 1-2 weeks intermittent, worsened last night, radiates to right arm and back, denies n/v/d. normal

## 2021-01-06 NOTE — ED PROVIDER NOTE - PATIENT PORTAL LINK FT
You can access the FollowMyHealth Patient Portal offered by [facility] by registering at the following website: [email or web address]. By joining [employer]’s FollowMyHealth portal, you will also be able to view your health information using other applications (apps) compatible with our system.

## 2021-01-06 NOTE — ED ADULT TRIAGE NOTE - RESPIRATORY RATE (BREATHS/MIN)
Discharge Summary


Date of Service


Feb 24, 2018.





Discharge Summary


Admission Date:


Feb 22, 2018 at 12:36


Discharge Date:  Feb 24, 2018


Discharge Disposition:  Home


Principal Diagnosis:  CAP superimposed on interstitial lung disease


Procedures:


[~ rep ct add3]]


(CHEST FOR PE) ANGIO WITH





CT DOSE: 281.08 mGycm





HISTORY: Chest pain  dyspnea





TECHNIQUE: Multiaxial CT images of the chest were performed following the


intravenous administration of contrast to evaluate the pulmonary arteries.


Maximal intensity projection images were also obtained.  A dose lowering


technique was utilized adhering to the principles of ALARA.








COMPARISON STUDY: 8/24/2017





FINDINGS: There is a normal caliber thoracic aorta with no evidence for


dissection. There is no evidence for pulmonary embolus. No pleural effusions. No


pneumothorax. Diffuse bilateral parenchymal fibrosis. This is in general is


somewhat progressive compared to the prior exam. There is a superimposed


infiltrative process of the left upper lobe. There is a component of peripheral


consolidation involving the lingula. Superimposed inflammatory processes


involving the right apex is not excluded. No acute bony abnormalities


appreciated. The old fractures and sternal manubrium as well as multifocal


compression deformities of the thoracic spine are unchanged from the prior


study.





IMPRESSION: 





1. 


1. Study is negative for pulmonary embolus.








2. Moderately progressive parenchymal fibrosis compared to the prior study.


3. Superimposed infiltrate medial and inferior left upper lobe and lingula as


well as right apex.


4. Old bone posttraumatic changes involving the sternal manubrium and thoracic


spine.

















The above report was generated using voice recognition software.  It may contain


grammatical, syntax or spelling errors.











Electronically signed by:  Craig Cerda M.D.


2/22/2018 11:30 AM





Dictated Date/Time:  2/22/2018 11:22 AM


-----------------------





Last Resulted CBC


2/23/18 05:29








Last Resulted BMP


2/24/18 08:30











Medication Reconciliation


New Medications:  


Levofloxacin (Levaquin) 750 Mg Tab


750 MG PO DAILY, #8 TAB





Prednisone Tab (Prednisone) 10 Mg Tab


10 MG PO UD, #42 TAB


6 po x 2 days then


 5 po x 2 days then


 4 po x 2 days then


 3 po x 2 days then


 2 po x 2 days then


 1 po x 2 days


Clotrimazole (Clotrimazole) 135 Appln/45 Gm Cr


1 APPLN EXT QID, #45 GM





 


Continued Medications:  


Acetamin/Butalbital/Caffeine (Fioricet) 1 Ea Tab


1 TAB PO UD PRN for Migraine, TAB





Atenolol (Tenormin) 25 Mg Tab


25 MG PO QPM, TAB





Calcium/Vitamin D (Os-Manny 500 Plus D)  Tab


1 TAB PO BID, TAB





Clonazepam (Klonopin) 0.5 Mg Tab


0.5 MG PO HS, TAB





Diltiazem Hcl Ext Rel (Tiazac) 240 Mg Capcr


240 MG PO QPM, CAP





Estropipate (Estropipate) 0.75 Mg Tab


0.75 MG PO DAILY





Losartan Potassium (Cozaar) 25 Mg Tab


25 MG PO QPM, TAB





Medroxyprogesterone (Provera) 2.5 Mg Tab


2.5 MG PO QPM, TAB





Ocuvite Preservision (Ocuvite Preservision) 1 Tab Tab


1 TAB PO BID, TAB





Pantoprazole (Pantoprazole Sodium) 40 Mg Tab


40 MG PO DAILY





Pravastatin Sodium (Pravastatin Sodium) 40 Mg Tab


80 MG PO DAILY





Prednisone (Prednisone) 5 Mg Tab


10 MG PO QAM, TAB











Discharge Exam


Physical Exam:  


   General Appearance:  no apparent distress


   Eyes:  EOMI


   ENT:  hearing grossly normal


   Neck:  trachea midline


   Respiratory/Chest:  no respiratory distress, no accessory muscle use, + rales

 (faint scattered throughout - suspect this is her baseline exam w ILD)


   Neurologic/Psychiatric:  CNs II-XII nml as tested, alert


   Skin:  normal color, warm/dry





Hospital Course


79 y/o F Hx RA with ILD, HTN, DM, depression.  Presents with progressive 

weakness, SOB and LQ abdominal pain wrapping around to her back.  She describes 

nausea and an episode of diarrhea prior to arrival.  She denies CP, vomiting, 

fevers or dysuria.  A CT chest was obtained in the ER and is consistent with 

multilobar pneumonia.  





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





2) Nausea and diarrhea - improved, none further





3) Trop elevation - very mild and no elevation - mild demand ischemia, 

outpatient follow up but no concerning s/s noted





4) HTN - dc on home meds





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 

up as outpt





6) PAF - Cont Atenolol, Diltiazem - is not currently anticoagulated, continue 

to follow up w PCP in this regard





7) RA - Placed on IV steroids, transition to prednisone taper





8) Hyponatremia - chronic, asymptomatic.  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 

related to ILD - totally asymptomatic at this time - outpt BMP next week





9) perirectal "yeast" infection - per her noted that this was dx'd as yeast by 

derm - clotrimazole cream, PCP or derm f/u in near future





stable for home








Full code - Heparin prophylaxis


Total Time Spent:  Greater than 30 minutes


This includes examination of the patient, discharge planning, medication 

reconciliation, and communication with other providers.





Discharge Instructions


Please refer to the electronic Patient Visit Report (Discharge Instructions) 

for additional information.





Additional Copies To


Ezra Hinson M.D. 20

## 2021-01-06 NOTE — ED PROVIDER NOTE - CLINICAL SUMMARY MEDICAL DECISION MAKING FREE TEXT BOX
Attn - pt with generalized weakness and wt loss and dx with crohn's and adrenal insufficiency.  also c/o sob.  COVID, labs, proBnp, D-dimer, Trop, TSH, ua, cxr. reassess.

## 2021-01-06 NOTE — ED PROVIDER NOTE - PROGRESS NOTE DETAILS
Dr. Manjula Moctezuma: CTA negative for PE. Pt reassessed at bedside, ambulating with steady gait. Will discharge home with out patient follow up. Plan discussed with daughter and patient, both agreeable with plan. Offered to send patient home with non-emergent transport, however patient elected to go home with a cab.

## 2021-01-06 NOTE — ED PROVIDER NOTE - PHYSICAL EXAMINATION
Was A Bandage Applied: Yes Attn - alert, nad, no pallor or jaundice, moist mm, no neck masses, lungs - clear, cor - rr, no M, abdo - soft, NT, ND, no CVAT, no HSM, no masses.  sternotomy and midline abdo scar. Extrem - no edema or c/t.  Neuro - intact and non focal.

## 2021-01-06 NOTE — ED ADULT NURSE NOTE - OBJECTIVE STATEMENT
89 y/o male patient presents ambulatory to the ED with complaint of weakness x 1 month and shortness of breath x 2 weeks. 89 y/o male patient presents ambulatory to the ED with complaint of weakness x 1 month and shortness of breath x 2 weeks. Patient states he has been seen by his PMD, GI and endocrinologist, found to have "new diagnosis of Crohn's disease and adrenal insufficiency"- stared on prednisone 5mg. Per patient he felt like he was going to fall this morning, bringing him to the ED. 89 y/o male patient presents ambulatory to the ED with complaint of weakness x 1 month and shortness of breath x 2 weeks. Patient states he has been seen by his PMD, GI and endocrinologist, found to have "new diagnosis of Crohn's disease and adrenal insufficiency"- stared on prednisone 5mg. Per patient he felt like he was going to fall this morning, bringing him to the ED. Patient states shortness of breath worsens with exertion and he is having difficulty walking long distances. Patient denies CP, N/V/D; afebrile in ED. Per patient, no recent travel, no sick contacts.

## 2021-01-06 NOTE — ED ADULT NURSE NOTE - NSIMPLEMENTINTERV_GEN_ALL_ED
Implemented All Fall with Harm Risk Interventions:  [place] to call system. Call bell, personal items and telephone within reach. Instruct patient to call for assistance. Room bathroom lighting operational. Non-slip footwear when patient is off stretcher. Physically safe environment: no spills, clutter or unnecessary equipment. Stretcher in lowest position, wheels locked, appropriate side rails in place. Provide visual cue, wrist band, yellow gown, etc. Monitor gait and stability. Monitor for mental status changes and reorient to person, place, and time. Review medications for side effects contributing to fall risk. Reinforce activity limits and safety measures with patient and family. Provide visual clues: red socks.

## 2021-01-07 ENCOUNTER — NON-APPOINTMENT (OUTPATIENT)
Age: 86
End: 2021-01-07

## 2021-01-07 LAB
M TB IFN-G BLD-IMP: NEGATIVE
QUANTIFERON TB PLUS MITOGEN MINUS NIL: 7.69 IU/ML
QUANTIFERON TB PLUS NIL: 0.09 IU/ML
QUANTIFERON TB PLUS TB1 MINUS NIL: 0.04 IU/ML
QUANTIFERON TB PLUS TB2 MINUS NIL: 0.02 IU/ML

## 2021-01-08 ENCOUNTER — FORM ENCOUNTER (OUTPATIENT)
Age: 86
End: 2021-01-08

## 2021-01-08 ENCOUNTER — APPOINTMENT (OUTPATIENT)
Dept: GERIATRICS | Facility: CLINIC | Age: 86
End: 2021-01-08
Payer: MEDICARE

## 2021-01-08 DIAGNOSIS — D64.9 ANEMIA, UNSPECIFIED: ICD-10-CM

## 2021-01-08 DIAGNOSIS — K50.90 CROHN'S DISEASE, UNSPECIFIED, W/OUT COMPLICATIONS: ICD-10-CM

## 2021-01-08 DIAGNOSIS — R53.83 OTHER FATIGUE: ICD-10-CM

## 2021-01-08 DIAGNOSIS — E27.40 UNSPECIFIED ADRENOCORTICAL INSUFFICIENCY: ICD-10-CM

## 2021-01-08 DIAGNOSIS — I95.1 ORTHOSTATIC HYPOTENSION: ICD-10-CM

## 2021-01-08 PROCEDURE — 99213 OFFICE O/P EST LOW 20 MIN: CPT | Mod: 95

## 2021-01-08 NOTE — HISTORY OF PRESENT ILLNESS
[Home] : at home, [unfilled] , at the time of the visit. [Medical Office: ([facility])___] : at the medical office located in  [Family Member] : family member [Verbal consent obtained from patient] : the patient, [unfilled] [FreeTextEntry1] : 89yoM  pmhx of cad s/p cabg, pci, HLD, BPH, anemia, Crohn's disease, seen via TH visit for worsening fatigue.\par Seen with his two dtrs.\par \par \par He endorses severe fatigue.  \par He is concerned as he is now having PINK stool, 4-5 diarrhea episodes daily.   No ab pain.  \par Eating minimally and drinking not much.\par \par He stopped Budesonide 2 weeks ago b/c it made him feel worse he states.\par \par Went to ER 1/6/21: due to fatigue: labwork reviewed\par no leukocytosis, anemia of 9.8, normal plts\par alb 2.9\par normal lft's,  na 140, K 5.2\par tsh normal\par \par \par His 2 daughters ([place] and [place]) came to his home today to help care for him due to his worsening fatigue.\par \par Reports he is no longer able to perform his IADL's on his own due to his fatigue.\par \par So they are looking into placing him into an Assisted Living Facility -- most likely in [place] so that he will be near his dtr.\par \par They have already spoken with two facilities and are planning to move forward with the move quickly.\par \par Dtrs Christine and Karla will be helping with his meal prep, and staying with him until they set up a new home health aide that will be with him for 8 hours a day.\par \par Endocrinology consult reviewed:  secondary adrenal insuff suspected, and due to his severe fatigue, he was started on prednisone 5mg daily.\par \par \par aspirin daily\par tylenol 1000mg bid\par tamsulosin--> advised to stop\par avodart\par lipitor\par protonix \par budesonide ---stopped 2 weeks ago.\par \par \par His bp yesterday has been sbp 130's at his baseline.\par \par

## 2021-01-08 NOTE — ASSESSMENT
[FreeTextEntry1] : \par planning to move into Assisted living facility for help with his IADL's\par \par -advised dtr's quant gold is valid x 30days\par \par will need covid test 7 days prior to move in\par \par

## 2021-01-08 NOTE — PHYSICAL EXAM
[General Appearance - Alert] : alert [] : no respiratory distress [Respiration, Rhythm And Depth] : normal respiratory rhythm and effort [Exaggerated Use Of Accessory Muscles For Inspiration] : no accessory muscle use [Impaired Insight] : insight and judgment were intact

## 2021-01-12 ENCOUNTER — APPOINTMENT (OUTPATIENT)
Dept: CARDIOLOGY | Facility: CLINIC | Age: 86
End: 2021-01-12

## 2021-01-12 ENCOUNTER — NON-APPOINTMENT (OUTPATIENT)
Age: 86
End: 2021-01-12

## 2021-01-15 ENCOUNTER — NON-APPOINTMENT (OUTPATIENT)
Age: 86
End: 2021-01-15

## 2021-01-15 RX ORDER — CHLORHEXIDINE GLUCONATE 4 %
325 (65 FE) LIQUID (ML) TOPICAL
Qty: 30 | Refills: 6 | Status: ACTIVE | COMMUNITY
Start: 2020-12-22 | End: 1900-01-01

## 2021-01-15 RX ORDER — VIT C/E/CUPERIC/ZINC/LUTEIN 226-90-0.8
CAPSULE ORAL
Qty: 60 | Refills: 5 | Status: ACTIVE | COMMUNITY
Start: 2021-01-15 | End: 1900-01-01

## 2021-01-15 RX ORDER — ACETAMINOPHEN 500 MG/1
500 TABLET, COATED ORAL
Qty: 120 | Refills: 0 | Status: ACTIVE | COMMUNITY
Start: 2021-01-15 | End: 1900-01-01

## 2021-01-15 RX ORDER — BUDESONIDE 3 MG/1
3 CAPSULE, COATED PELLETS ORAL
Qty: 90 | Refills: 0 | Status: DISCONTINUED | COMMUNITY
Start: 2020-10-30 | End: 2021-01-15

## 2021-01-15 RX ORDER — PREDNISONE 5 MG/1
5 TABLET ORAL
Qty: 30 | Refills: 3 | Status: ACTIVE | COMMUNITY
Start: 2021-01-15 | End: 1900-01-01

## 2021-01-15 RX ORDER — ATORVASTATIN CALCIUM 10 MG/1
10 TABLET, FILM COATED ORAL DAILY
Qty: 15 | Refills: 3 | Status: ACTIVE | COMMUNITY
Start: 2020-11-27 | End: 1900-01-01

## 2021-01-19 DIAGNOSIS — Z20.822 CONTACT WITH AND (SUSPECTED) EXPOSURE TO COVID-19: ICD-10-CM

## 2021-01-20 RX ORDER — ASPIRIN ENTERIC COATED TABLETS 81 MG 81 MG/1
81 TABLET, DELAYED RELEASE ORAL
Qty: 3 | Refills: 3 | Status: ACTIVE | COMMUNITY
Start: 2021-01-19 | End: 1900-01-01

## 2021-01-20 RX ORDER — DUTASTERIDE 0.5 MG/1
0.5 CAPSULE, LIQUID FILLED ORAL
Qty: 30 | Refills: 5 | Status: ACTIVE | COMMUNITY
Start: 2020-11-24

## 2021-01-27 LAB — SARS-COV-2 N GENE NPH QL NAA+PROBE: NOT DETECTED

## 2021-01-27 NOTE — ED PROCEDURE NOTE - CPROC ED POST PROC CARE GUIDE1
1340 Received call from Charge RN Analia, patient in afib rvr and overall concerned with his condition.      1355 Dr. Rossi, Dr. Lee and RODNEY Valdivia Np at bedside.  Orders received for CCP.  Patient continues in Afib rvr 130-150's, blood pressure stable.  Bloody stool noted in flexiseal.  Cont on non-rebreather, unable to obtain sats.  Orders received to transfer to W3.    1412 CCP results obtained.  Dr. Lu at bedside.  Dr. Coley and Dr. Felix at bedside, orders received from Dr. Coley for bicarb gtt.  Blood pressure low 60/35.  Orders received to transfer to ICU.  Dr. Reece aware of transfer and ordered 2 liter IVF bolus.  Will await availability of  ICU bed    1435  Blood pressure still low, Levophed gtt and 2 amp sodium bicarb given per Nyla NP order.      1500  Nyla Np at bedside emergently preparing to insert femoral central line.                 Instructed patient/caregiver regarding signs and symptoms of infection./Verbal/written post procedure instructions were given to patient/caregiver.

## 2021-02-02 ENCOUNTER — APPOINTMENT (OUTPATIENT)
Dept: CARDIOLOGY | Facility: CLINIC | Age: 86
End: 2021-02-02

## 2021-02-11 ENCOUNTER — NON-APPOINTMENT (OUTPATIENT)
Age: 86
End: 2021-02-11

## 2021-04-26 NOTE — ED ADULT TRIAGE NOTE - HEIGHT IN CM
167.64 Pain Refusal Text: I offered to prescribe pain medication but the patient refused to take this medication.

## 2021-04-29 ENCOUNTER — APPOINTMENT (OUTPATIENT)
Dept: DERMATOLOGY | Facility: CLINIC | Age: 86
End: 2021-04-29

## 2021-08-10 NOTE — ED PROVIDER NOTE - NONTENDER LOCATION
8/10/2021    Alvarado Ramirez JrEarlene             1973        1. Diabetic?    []  Yes     [x]  No   [] Pills:      []  Insulin:    [] Diet Controlled - no medication    2. Blood thinners? []  Yes     [x] No       [] Aspirin    [] Pradaxa   [] Effient   [] NSAIDS   [] Xarelto   [] Aggrenox   [] Warfarin   [] Eliquis   [] Plavix   [] Lixiana    Why does the patient take blood thinners?   Name of Doctor that is monitoring:     3. MI or Stroke within 6 months?  [] Yes     [x] No    4. Narcotic Medications?  [] Yes     [x] No  Name of medication:     5. Anxiety Medications?  [x] Yes     [] No  Name of medication:  As needed    6. Do you take Phen-Phen? [] Yes  [x] No    7. Any bleeding disorders?  [] Yes     [x] No  If yes, what?    Name of Hematologist:  Current Medication for this disorder?   Medication given prior to procedure?   Have there been any problems with bleeding after surgical or dental procedures?  [] Yes     [] No  When:    What Happened:    8. Artificial Applications  [x] None  [] Artificial valve Type:   [] Pacemaker Type:  [] Defibrillator Type:  [] Stent Placement Type:  [] Joint Placement Type:  What:     When:  (If patient had joint placement surgery must wait 6 months to do procedure.)    9. Allergies?  [] Yes     [x] No  [] Medications  [] Latex    10. Reaction to Anesthesia or previous endoscopies? [] Yes     [x] No  What:    11. Have you been diagnosed with sleep apnea?   [] Yes     [x] No  Do you have a CPAP machine? [] Yes     [] No    12.  Have you been diagnosed with COPD? [] Yes     [x] No   Do you have home oxygen? [] Yes     [] No    13. History of MRSA?  [] Yes   [x] No    14. History of drug or alcohol abuse?   [] Yes     [x] No    14. BMI checked  [x]  23.70    15.  When was your last colonoscopy? first    [facility]: [phone] [facility]: [phone]    Reviewed by: Nevaeh Fisher LPN  8/10/2021            
Colonoscopy scheduled on 09/10/21  Rx sent to preferred pharmacy  Covid screen ordered and scheduled  Instructions sent via portal    
Patient called to schedule colonoscopy. Please call patient.  
lmtc 08/10/21  
**ATTENDING ADDENDUM (Dr. Ruben Dent): POSITIVE mild distension across the abdomen.

## 2022-02-16 NOTE — PATIENT PROFILE ADULT - BRADEN SCORE
(Inserted Image. Unable to display)     September 30, 2019      DEANDRE WILKINSON   [address] [phone]          Dear DEANDRE,      Thank you for selecting [facility] (previously [facility]) for your healthcare needs.      Our records indicate you are due for the following services:     Non-Fasting Labs.    If you had your labs done at another facility or with Direct Access Lab Testing at [facility], please bring in a copy of the results to your next visit, mail a copy, or drop off a copy of your results to your Healthcare Provider.      To schedule an appointment or if you have further questions, please contact your primary clinic:   [facility]       [phone]   [facility]       [phone]              [facility]     [phone]      Powered by [employer]    Sincerely,    Aditya Olson MD   none 16

## 2022-06-19 NOTE — ED PROVIDER NOTE - NSCAREINITIATED _GEN_ER
D.c paperwork, follow up and at home instructions discussed with mom, all additional questions answered, pts toe edilberto taped prior to d.c. No IV  
Wallace Parrish(Resident)

## 2022-12-13 NOTE — ED ADULT TRIAGE NOTE - HEART RATE (BEATS/MIN)
75 Area L Indication Text: Tumors in this location are included in Area L (trunk and extremities).  Mohs surgery is indicated for larger tumors, or tumors with aggressive histologic features, in these anatomic locations.

## 2023-01-01 NOTE — PATIENT PROFILE ADULT - DO YOU NEED ADDITIONAL SERVICES TO MANAGE ANY OF THESE MEDICAL CONDITIONS AT HOME?
Discharge Coreen Martinez, Male Lidia Hawthorne) MRN: [id number] [facility]: [id number]  Admit Date: dmit Time: 12:56:00  Admission Type: Following Delivery  Initial Admission Statement: 39+4 week infant admitted s/p scheduled  for breech presentation due to respiratory distress requiring CPAP. Hospitalization Summary  [facility] [address]   Service Type: [employer] Drilling Date: dmit Time: 11:30     DISCHARGE SUMMARY   Discharge Date: ischarge Time: 12:00  BW: 3300 (gms)Admit DOL: 0Disposition: Discharge Home   Birth Head Circ: 35   Admit GA: 39 wks 4 dAdmission Weight: 3300 (gms)Admit Head Circ: 35   Discharge Weight: 9811 (gms)   Discharge Date: ischarge Time: 12:00Discharge CGA: 40 wks 0 d   Admission Type: Following Delivery  [facility] [address] [place] [place] [address]    ACTIVE DIAGNOSIS   Diagnosis: Nutritional Support System: FEN/GI Start Date: 2023   History: 39+4 week AGA infant delivered via scheduled  due to breech presentation. Infant with respiratory distress requiring CPAP and made NPO on admission. IVF started with D10W at 60ml/kg/day. Initial BG 90. Mother plans to breastfeed and will begin pumping milk. - feeds started. IV discontinued . Ad vivi breast feeding with DEBM supplementation . Assessment: Term infant now breast feeding ad vivi. IVF discontinued 23. Glucoses stable. Good urine output and multiple stools. Allowed to ad vivi breast feed only overnight. Weight loss of 83 gms, now 6.1% below birth weight on DOL 3. No new labs for review. Plan: Discharge to home on ad vivi breast feed with PCP follow-up in 24 hours. Diagnosis: Infectious Screen <= 28D (P00.2) System: Infectious Disease Start Date: 2023   History: 39+4 week AGA infant delivered via scheduled  due to breech presentation. Infant with respiratory distress requiring CPAP.   AROM at delivery, clear fluid and no maternal fevers. Blood cultures were obtained. Patient was placed on Ampicillin, and Gentamicin. Treated for 36 hours with cultures negative to date. Assessment: CBC reassuring x 2 with culture negative at 3 days. Currently clinically well appearing. Plan: Stable for discharge home    Diagnosis: Term Infant System: Gestation Start Date: 2023   History: This is a 39+4 wks and 3300 grams term infant. Assessment: 3 day old term AGA infant, now 36 0/7 weeks. Stable in RA, ad vivi feeding, and open crib. All screenings passed and circ completed by OB.  screen sent and remains pending at this time. Plan: Stable for discharge home  Follow-up appt scheduled with [facility] on 23    Diagnosis: At risk for Hyperbilirubinemia System: Hyperbilirubinemia Start Date: 2023   History: 39+4 week AGA infant at risk for hyperbilirubinemia  Assessment: Mother O+, Ab-. Infant O+/GEENA neg. Most recent bilirubin is 9.1 mg/dL at 65.5 hr with LL 18.8 mg/dL and exchange level 25.5 mg/dL. Infant is 9.7 mg/dL below treatment threshold with recommendation for follow-up in 3 days. Plan: PCP Follow-up as outpatient. HEALTH MAINTENANCE (SCREENING & IMMUNIZATION)   Screening  Screening Date: 2023 Status: Done  Comments: On clear fluids and feeds. Results are pending at the time of discharge.      Hearing Screening  Hearing Screen Type: Auditory Screen  Hearing Screen Date: 2023  Status: Done  Hearing Screen Result: Passed    [facility]  Screening Date: 2023 Screen Result: Pass Status: Done  Comments: pre 98 / post 99    Immunization   Immunization Date: 2023   Immunization Type: Hepatitis B  Status: Done  DISCHARGE NUTRITION  Intake Type: 20 kcal/oz BMTotal (mL/kg/d): -1      DISCHARGE PHYSICAL EXAM  DOL: 3Temperature: 98.4Heart Rate: 138Resp Rate: 46  O2 Sats: 100  Today's Weight (g): 3097Change 24 hrs: -83  Birth Weight (g): 3300Birth Gest: 39 wks 4 dPos-Mens Age: 40 wks 0 d  Date: 2023  Bed Type: Open Crilace of Service: NICU  General Exam: Infant is alert and active. Head/Neck: Head is normal in size and configuration. Anterior fontanel is flat, open, and soft. Suture lines are open. Pupils are reactive to light. Red reflex positive bilaterally. Nares are patent. Palate is intact. No lesions of the oral cavity or pharynx are noticed. Chest: Chest is normal externally and expands symmetrically. Breath sounds are equal bilaterally, and there are no significant adventitious breath sounds detected. Heart: First and second sounds are normal. No murmur is detected. Femoral pulses are strong and equal. Brisk capillary refill. Abdomen: Soft, non-tender, and non-distended. Three vessel cord present. No hepatosplenomegaly. Bowel sounds are present. No hernias, masses, or other defects. Anus is present, patent and in normal position. Genitalia: Normal external genitalia are present. Extremities: No deformities noted. Normal range of motion for all extremities. Hips show no evidence of instability. Neurologic: Infant responds appropriately. Normal primitive reflexes for gestation are present and symmetric. No pathologic reflexes are noted. Skin: Pink and well perfused. No rashes, petechiae, or other lesions are noted. Mild erythema toxicum. Mild jaundice.       MATERNAL HISTORY  Jackie Tang MRN: [id number]  Mother's : 1993Mother's Age: 29Mother's Blood Type: O PosMother's Race: White  RPR Serology: Non-ReactiveHIV: NegativeRubella:  ImmuneGBS: NegativeHBsAg: Negative   Prenatal Care: YesEDC OB: 2023  Family History:  None pertinent  Complications - Preg/Labor/Deliv: Yes  Breech presentation  Maternal Steroids No  Maternal Medications: No  Pregnancy Comment  Unremarkable pregnancy, breech presentation     DELIVERY HISTORY  Date of Birth: Time of Birth: 10:10:00Fluid at Delivery: Clear  Birth Type: SingleBirth Order: SinglePresentation: Breech  Delivering OB: Viviana Shaw Anesthesia: SpinalROM Prior to Delivery: No  Delivery Type:  Section  Reason for Attending: Respiratory Distress - (other)  Birth Hospital: [facility] [address] [address]  Delivery Procedures: Monitoring VS, NP/OP Suctioning, Supplemental O2, Warming/Drying  Delayed Cord Clamping,2023-    Positive Pressure Ventilation,2023-DARLYN PEREZ  APGARS  1 Minute: 55 Minutes: 610 Minutes: 9  Physician at Delivery: Josef Cramer and Delivery Comment: NICU called to delivery and arrived at 5 minutes of life (see prior events in RN delivery progress note). Infant on RW and cyanotic throughout receiving CPAP. Assumed head of bed and provided PPV x 1 minute. FiO2 increased to 70% max. HR >100. Infant's color improved. Spontaneous respiratory effort began at 10 minutes of life and infant transitioned to CPAP 5. FiO2 weaned gradually to 21%. Attempted to remove two separate times with resulting desaturations to the low-mid 80s. Decision made to admit infant to NICU. Admission Comment: Admitted on bCPAP 5, NPO while on IVF and on Amp/Gent for sepsis rule out.      PROCEDURES HISTORY  Delayed Cord Clamping,  2023-2023, 1, L&D    Positive Pressure Ventilation,  2023-2023, 1, L&D, Jose Weston    Circumcision Performed by OB,  2023, 1, NICU, XXX, XXX  Comment: Dr. Rony Farrell  Ampicillin Start Date: 2023 End Date/Time: 2023 20:00 Duration: 2    Erythromycin Eye Ointment (Once) Start Date: 2023 End Date: 2023 Duration: 1    Gentamicin (Once) Start Date: 2023 End Date: 2023 Duration: 1    Vitamin K (Once) Start Date: 2023 End Date: 2023 Duration: 1    LAB  CULTURE HISTORY  BloodDate Done: 2023Result: No GrowthStatus: Active  Comment: negative at 3 days      RESPIRATORY SUPPORT HISTORY  Start Date: 2023 End Date: 2023 Duration: 2Type: Nasal CPAP FiO2  0.21 CPAP  5     DIAGNOSIS HISTORY   Diagnosis: Respiratory Distress Syndrome (P22.0) System: Respiratory Start Date: 2023 End Date: 2023 Resolved  History: 39+4 week AGA infant delivered via scheduled  due to breech presentation. Infant with respiratory distress requiring CPAP. Admitted on bCPAP 5 35%. support on admission. ABG on admission was 7.35/39.9/51/21.8/-3.6. Tachypnea improved following initiation of CPAP but continued O2 sats in low to mid 90s. Infant stable and comfortable on bCPAP +5 and taken to room air at 0800 on . Assessment: Stable in room air since 23.   Plan: Stable for discharge home    ATTESTATION       Authenticated by: Aliya Chu MD   Date/Time: 2023 10:18 no

## 2023-02-13 NOTE — ED ADULT NURSE NOTE - CAS EDP DISCH DISPOSITION ADMI
Telemetry Odomzo Pregnancy And Lactation Text: This medication is Pregnancy Category X and is absolutely contraindicated during pregnancy. It is unknown if it is excreted in breast milk.

## 2023-03-11 NOTE — PROGRESS NOTE ADULT - PROBLEM SELECTOR PLAN 5
Pt is communicating via pen and paper that she would like to be extubated. Attempted to educate on the importance of ETT for airway protection. Pt believes she has been breathing on her own and would like a SBT. Will communicate these wishes to day shift team. Propofol turned up to increase comfort. stable from a cardiac perspective.   No anginal symptoms and no evidence for fluid overload.   aspirin 81mg  Continue home dose of ranexa 500mg bid  Outpatient f/u with Dr. Lisker.

## 2023-03-22 NOTE — ED ADULT TRIAGE NOTE - PAIN RATING/NUMBER SCALE (0-10): ACTIVITY
Sent Dr. Mcgovern secure chat informing of darker red urine with clots, also leaking around catheter with clots around catheter.    6

## 2023-05-11 NOTE — DISCHARGE NOTE NURSING/CASE MANAGEMENT/SOCIAL WORK - INFLUENZA IMMUNIZATION DATE (APPROXIMATE):
[address]    Phone * [phone]   Surgical Scheduling Direct line Phone * [phone]  Fax * [phone]      Tammie Ceballos      1950    female    [address]  [address] [address]  Marital Status:         Home Phone: [phone]   Cell Phone:   Telephone Information:   Mobile [phone]              Surgeon: Dr. Colby   Surgery Date:05-   Time: TF Brian Wray     Procedure: Left breast lumpectomy with preop needle localization with sentinel lymph node biopsy    Outpatient     Diagnosis: Left breast invasive ductal adenocarcinoma    Important Medical History: In Epic    Special Inst/Equip:     CPT Codes: 03529, 91718    Latex Allergy:   no Cardiac Device:  no    Anesthesia Type: MAC    Case Location:  Main OR     Preadmission Testing: Phone Call      PAT Date and Time: ________________________________    PAT Confirmation #: _________________________________    Post Op Visit:  ______________________________________    Need Preop Cardiac Clearance:   no    Does patient have Cardiologist/physician?  No      Surgery Conformation #:  ______________________________________________    : __________________________________ Date:____________________        [employer] Name:  [employer]
Patient scheduled for surgery with Dr. Helen Antonio on 05- at 12:00pm with an arrival of 8:30am at [facility] for a 9:00am needle localization. [place] lymph node injection at 10:15am.  Preop orders (H/H, BMP) to be done on 05- at [facility]. Surgery instructions given to patient. Surgery consent signed. Antibacterial soap given. Pulmonary clearance per Gracie Bradley CNP on 05-. Patient advised that she does not follow with Dr. Shelby Collier.
04-Nov-2020

## 2023-06-12 NOTE — H&P ADULT - NSHPATTENDINGPLANDISCUSS_GEN_ALL_CORE
patient: expressed understanding of plan Spironolactone Counseling: Patient advised regarding risks of diarrhea, abdominal pain, hyperkalemia, birth defects (for female patients), liver toxicity and renal toxicity. The patient may need blood work to monitor liver and kidney function and potassium levels while on therapy. The patient verbalized understanding of the proper use and possible adverse effects of spironolactone.  All of the patient's questions and concerns were addressed.

## 2023-07-07 NOTE — CONSULT NOTE ADULT - CONSULT REQUESTED BY NAME
Darnell May is a 36 year old male presenting for a vasectomy.    Medications verified, no changes.    Denies known Latex allergy or symptoms of Latex sensitivity.    Social History     Tobacco Use   Smoking Status Never   • Passive exposure: Never   Smokeless Tobacco Never              ED

## 2023-12-07 NOTE — H&P ADULT - NSHPPHYSICALEXAM_GEN_ALL_CORE
Hub staff attempted to follow warm transfer process and was unsuccessful     Caller: Erin Bañuelos    Relationship to patient: Self    Best call back number: [phone]    Patient is needing: PT CALLING BACK AGAIN TO R/S LAB APPT. UNABLE TO WT.        T(C): 36.7  HR: 78 (72 - 78)  BP: 165/86 (155/87 - 178/98)  RR: 17 (16 - 17)  SpO2: 100% (97% - 100%)  Tmax: T(C): , Max: 36.7 (11-17-18 @ 23:13)    General: well developed, well nourished, NAD  Neuro: alert and oriented, no focal deficits, moves all extremities spontaneously  HEENT: NCAT, EOMI, anicteric, mucosa moist  Respiratory: airway patent, respirations unlabored  CVS: regular rate and rhythm  Abdomen: soft, nontender, nondistended  Extremities: no edema, sensation and movement grossly intact  Skin: warm, dry, appropriate color

## 2024-03-20 NOTE — CHART NOTE - NSCHARTNOTEFT_GEN_A_CORE
Clarified meds with pt- pt does not take Lasix at home, stopped taking this months ago. Pt also stopped taking Metoprolol months ago. Pt takes Ranexa 500mg BID, Norvasc 5mg daily, Proscar 5mg daily, Flomax 0.4mg daily, Finasteride 5mg daily, aspirin, atorvastatin, pantoprazole. Pt does not take Budesonide anymore- reports only took this a couple months ago when he had a bad cough. Med rec updated. Will hold Flomax, Ranexa, and Norvasc given orthostatic hypotension. Discussed with Dr. Deshpande. pt still in ED

## 2024-05-28 NOTE — PATIENT PROFILE ADULT - FUNCTIONAL SCREEN CURRENT LEVEL: SWALLOWING (IF SCORE 2 OR MORE FOR ANY ITEM, CONSULT REHAB SERVICES), MLM)
Doxycycline Pregnancy And Lactation Text: This medication is Pregnancy Category D and not consider safe during pregnancy. It is also excreted in breast milk but is considered safe for shorter treatment courses. Minocycline Counseling: Patient advised regarding possible photosensitivity and discoloration of the teeth, skin, lips, tongue and gums.  Patient instructed to avoid sunlight, if possible.  When exposed to sunlight, patients should wear protective clothing, sunglasses, and sunscreen.  The patient was instructed to call the office immediately if the following severe adverse effects occur:  hearing changes, easy bruising/bleeding, severe headache, or vision changes.  The patient verbalized understanding of the proper use and possible adverse effects of minocycline.  All of the patient's questions and concerns were addressed. Winlevi Pregnancy And Lactation Text: This medication is considered safe during pregnancy and breastfeeding. Tazorac Counseling:  Patient advised that medication is irritating and drying.  Patient may need to apply sparingly and wash off after an hour before eventually leaving it on overnight.  The patient verbalized understanding of the proper use and possible adverse effects of tazorac.  All of the patient's questions and concerns were addressed. Sarecycline Counseling: Patient advised regarding possible photosensitivity and discoloration of the teeth, skin, lips, tongue and gums.  Patient instructed to avoid sunlight, if possible.  When exposed to sunlight, patients should wear protective clothing, sunglasses, and sunscreen.  The patient was instructed to call the office immediately if the following severe adverse effects occur:  hearing changes, easy bruising/bleeding, severe headache, or vision changes.  The patient verbalized understanding of the proper use and possible adverse effects of sarecycline.  All of the patient's questions and concerns were addressed. Aklief Pregnancy And Lactation Text: It is unknown if this medication is safe to use during pregnancy.  It is unknown if this medication is excreted in breast milk.  Breastfeeding women should use the topical cream on the smallest area of the skin for the shortest time needed while breastfeeding.  Do not apply to nipple and areola. Azelaic Acid Pregnancy And Lactation Text: This medication is considered safe during pregnancy and breast feeding. Topical Clindamycin Counseling: Patient counseled that this medication may cause skin irritation or allergic reactions.  In the event of skin irritation, the patient was advised to reduce the amount of the drug applied or use it less frequently.   The patient verbalized understanding of the proper use and possible adverse effects of clindamycin.  All of the patient's questions and concerns were addressed. Bactrim Counseling:  I discussed with the patient the risks of sulfa antibiotics including but not limited to GI upset, allergic reaction, drug rash, diarrhea, dizziness, photosensitivity, and yeast infections.  Rarely, more serious reactions can occur including but not limited to aplastic anemia, agranulocytosis, methemoglobinemia, blood dyscrasias, liver or kidney failure, lung infiltrates or desquamative/blistering drug rashes. Birth Control Pills Counseling: Birth Control Pill Counseling: I discussed with the patient the potential side effects of OCPs including but not limited to increased risk of stroke, heart attack, thrombophlebitis, deep venous thrombosis, hepatic adenomas, breast changes, GI upset, headaches, and depression.  The patient verbalized understanding of the proper use and possible adverse effects of OCPs. All of the patient's questions and concerns were addressed. Erythromycin Pregnancy And Lactation Text: This medication is Pregnancy Category B and is considered safe during pregnancy. It is also excreted in breast milk. Birth Control Pills Pregnancy And Lactation Text: This medication should be avoided if pregnant and for the first 30 days post-partum. Isotretinoin Counseling: Patient should get monthly blood tests, not donate blood, not drive at night if vision affected, not share medication, and not undergo elective surgery for 6 months after tx completed. Side effects reviewed, pt to contact office should one occur. Detail Level: Detailed Topical Clindamycin Pregnancy And Lactation Text: This medication is Pregnancy Category B and is considered safe during pregnancy. It is unknown if it is excreted in breast milk. Benzoyl Peroxide Counseling: Patient counseled that medicine may cause skin irritation and bleach clothing.  In the event of skin irritation, the patient was advised to reduce the amount of the drug applied or use it less frequently.   The patient verbalized understanding of the proper use and possible adverse effects of benzoyl peroxide.  All of the patient's questions and concerns were addressed. High Dose Vitamin A Counseling: Side effects reviewed, pt to contact office should one occur. Spironolactone Pregnancy And Lactation Text: This medication can cause feminization of the male fetus and should be avoided during pregnancy. The active metabolite is also found in breast milk. Topical Sulfur Applications Pregnancy And Lactation Text: This medication is Pregnancy Category C and has an unknown safety profile during pregnancy. It is unknown if this topical medication is excreted in breast milk. Tetracycline Pregnancy And Lactation Text: This medication is Pregnancy Category D and not consider safe during pregnancy. It is also excreted in breast milk. Topical Retinoid counseling:  Patient advised to apply a pea-sized amount only at bedtime and wait 30 minutes after washing their face before applying.  If too drying, patient may add a non-comedogenic moisturizer. The patient verbalized understanding of the proper use and possible adverse effects of retinoids.  All of the patient's questions and concerns were addressed. Dapsone Pregnancy And Lactation Text: This medication is Pregnancy Category C and is not considered safe during pregnancy or breast feeding. Azithromycin Counseling:  I discussed with the patient the risks of azithromycin including but not limited to GI upset, allergic reaction, drug rash, diarrhea, and yeast infections. Use Enhanced Medication Counseling?: No Azithromycin Pregnancy And Lactation Text: This medication is considered safe during pregnancy and is also secreted in breast milk. Aklief counseling:  Patient advised to apply a pea-sized amount only at bedtime and wait 30 minutes after washing their face before applying.  If too drying, patient may add a non-comedogenic moisturizer.  The most commonly reported side effects including irritation, redness, scaling, dryness, stinging, burning, itching, and increased risk of sunburn.  The patient verbalized understanding of the proper use and possible adverse effects of retinoids.  All of the patient's questions and concerns were addressed. Tazorac Pregnancy And Lactation Text: This medication is not safe during pregnancy. It is unknown if this medication is excreted in breast milk. Azelaic Acid Counseling: Patient counseled that medicine may cause skin irritation and to avoid applying near the eyes.  In the event of skin irritation, the patient was advised to reduce the amount of the drug applied or use it less frequently.   The patient verbalized understanding of the proper use and possible adverse effects of azelaic acid.  All of the patient's questions and concerns were addressed. Erythromycin Counseling:  I discussed with the patient the risks of erythromycin including but not limited to GI upset, allergic reaction, drug rash, diarrhea, increase in liver enzymes, and yeast infections. Bactrim Pregnancy And Lactation Text: This medication is Pregnancy Category D and is known to cause fetal risk.  It is also excreted in breast milk. Isotretinoin Pregnancy And Lactation Text: This medication is Pregnancy Category X and is considered extremely dangerous during pregnancy. It is unknown if it is excreted in breast milk. Spironolactone Counseling: Patient advised regarding risks of diarrhea, abdominal pain, hyperkalemia, birth defects (for female patients), liver toxicity and renal toxicity. The patient may need blood work to monitor liver and kidney function and potassium levels while on therapy. The patient verbalized understanding of the proper use and possible adverse effects of spironolactone.  All of the patient's questions and concerns were addressed. Topical Sulfur Applications Counseling: Topical Sulfur Counseling: Patient counseled that this medication may cause skin irritation or allergic reactions.  In the event of skin irritation, the patient was advised to reduce the amount of the drug applied or use it less frequently.   The patient verbalized understanding of the proper use and possible adverse effects of topical sulfur application.  All of the patient's questions and concerns were addressed. Benzoyl Peroxide Pregnancy And Lactation Text: This medication is Pregnancy Category C. It is unknown if benzoyl peroxide is excreted in breast milk. Tetracycline Counseling: Patient counseled regarding possible photosensitivity and increased risk for sunburn.  Patient instructed to avoid sunlight, if possible.  When exposed to sunlight, patients should wear protective clothing, sunglasses, and sunscreen.  The patient was instructed to call the office immediately if the following severe adverse effects occur:  hearing changes, easy bruising/bleeding, severe headache, or vision changes.  The patient verbalized understanding of the proper use and possible adverse effects of tetracycline.  All of the patient's questions and concerns were addressed. Patient understands to avoid pregnancy while on therapy due to potential birth defects. Topical Retinoid Pregnancy And Lactation Text: This medication is Pregnancy Category C. It is unknown if this medication is excreted in breast milk. High Dose Vitamin A Pregnancy And Lactation Text: High dose vitamin A therapy is contraindicated during pregnancy and breast feeding. Winlevi Counseling:  I discussed with the patient the risks of topical clascoterone including but not limited to erythema, scaling, itching, and stinging. Patient voiced their understanding. Dapsone Counseling: I discussed with the patient the risks of dapsone including but not limited to hemolytic anemia, agranulocytosis, rashes, methemoglobinemia, kidney failure, peripheral neuropathy, headaches, GI upset, and liver toxicity.  Patients who start dapsone require monitoring including baseline LFTs and weekly CBCs for the first month, then every month thereafter.  The patient verbalized understanding of the proper use and possible adverse effects of dapsone.  All of the patient's questions and concerns were addressed. Doxycycline Counseling:  Patient counseled regarding possible photosensitivity and increased risk for sunburn.  Patient instructed to avoid sunlight, if possible.  When exposed to sunlight, patients should wear protective clothing, sunglasses, and sunscreen.  The patient was instructed to call the office immediately if the following severe adverse effects occur:  hearing changes, easy bruising/bleeding, severe headache, or vision changes.  The patient verbalized understanding of the proper use and possible adverse effects of doxycycline.  All of the patient's questions and concerns were addressed. 0 = swallows foods/liquids without difficulty

## 2024-08-02 NOTE — DISCHARGE NOTE PROVIDER - NSDCACTIVITY_GEN_ALL_CORE
Addended by: KOSTA MA on: 8/2/2024 07:37 AM     Modules accepted: Level of Service     No restrictions

## 2024-12-15 NOTE — DISCHARGE NOTE PROVIDER - NSDCQMPCI_CARD_ALL_CORE
Patient to X-ray and CT at this time. Bloodwork sent to lab via  at bedside including 2 sets of blood cultures.   No

## 2025-02-25 NOTE — ED ADULT TRIAGE NOTE - CCCP TRG CHIEF CMPLNT
Price (Do Not Change): 0.00 Detail Level: Simple Instructions: This plan will send the code FBSE to the PM system.  DO NOT or CHANGE the price. abdominal pain

## 2025-04-07 NOTE — ED ADULT TRIAGE NOTE - NS ED TRIAGE AVPU SCALE
04/07/25 11:32 AM     Chart reviewed for Diabetic Eye Exam ; nothing is submitted to the patient's insurance at this time.     Vinicio Veloz MA   [facility]  
Alert-The patient is alert, awake and responds to voice. The patient is oriented to time, place, and person. The triage nurse is able to obtain subjective information.

## 2025-04-28 NOTE — PATIENT PROFILE ADULT - NSPROPASSIVESMOKEEXPOSURE_GEN_A_NUR
Refill encounter per protocol for metformin  Last office visit 2/20/25  Last refill 1/3/25 (Bonifacio MAY)  Last lab 4/17/25 (Dr. Martínez)  Upcoming appointment 5/23/25    It appears that Dr. Rabago has never prescribed Metformin for the pt.     Routed to provider to see if he would like to refill med.      Unknown

## 2025-05-27 NOTE — ED PROVIDER NOTE - CONTACT TIME
Reviewed AVS with patient. Discussed medication and follow-up needs. Answer patient questions. Currently awaiting patient's friend, Jose E, for pickup.   09-Dec-2020 15:27